# Patient Record
Sex: FEMALE | Race: WHITE | NOT HISPANIC OR LATINO | Employment: FULL TIME | URBAN - METROPOLITAN AREA
[De-identification: names, ages, dates, MRNs, and addresses within clinical notes are randomized per-mention and may not be internally consistent; named-entity substitution may affect disease eponyms.]

---

## 2017-04-10 DIAGNOSIS — Z12.31 ENCOUNTER FOR SCREENING MAMMOGRAM FOR MALIGNANT NEOPLASM OF BREAST: ICD-10-CM

## 2017-04-11 ENCOUNTER — ALLSCRIPTS OFFICE VISIT (OUTPATIENT)
Dept: OTHER | Facility: OTHER | Age: 42
End: 2017-04-11

## 2017-04-14 ENCOUNTER — GENERIC CONVERSION - ENCOUNTER (OUTPATIENT)
Dept: OTHER | Facility: OTHER | Age: 42
End: 2017-04-14

## 2017-04-14 LAB
ADDITIONAL INFORMATION (HISTORICAL): NORMAL
ADEQUACY: (HISTORICAL): NORMAL
COMMENT (HISTORICAL): NORMAL
CYTOTECHNOLOGIST: (HISTORICAL): NORMAL
INTERPRETATION (HISTORICAL): NORMAL
LMP (HISTORICAL): NORMAL
PREV. BX: (HISTORICAL): NORMAL
PREV. PAP (HISTORICAL): NORMAL
SOURCE (HISTORICAL): NORMAL

## 2017-05-19 ENCOUNTER — GENERIC CONVERSION - ENCOUNTER (OUTPATIENT)
Dept: OTHER | Facility: OTHER | Age: 42
End: 2017-05-19

## 2017-05-19 ENCOUNTER — HOSPITAL ENCOUNTER (OUTPATIENT)
Dept: MAMMOGRAPHY | Facility: HOSPITAL | Age: 42
Discharge: HOME | End: 2017-05-19
Attending: OBSTETRICS & GYNECOLOGY
Payer: COMMERCIAL

## 2017-05-19 DIAGNOSIS — Z12.31 ENCOUNTER FOR SCREENING MAMMOGRAM FOR MALIGNANT NEOPLASM OF BREAST: ICD-10-CM

## 2017-05-19 PROCEDURE — 77063 BREAST TOMOSYNTHESIS BI: CPT

## 2017-05-19 PROCEDURE — G0202 SCR MAMMO BI INCL CAD: HCPCS

## 2017-05-24 DIAGNOSIS — R92.8 OTHER ABNORMAL AND INCONCLUSIVE FINDINGS ON DIAGNOSTIC IMAGING OF BREAST: ICD-10-CM

## 2017-05-25 ENCOUNTER — HOSPITAL ENCOUNTER (OUTPATIENT)
Dept: ULTRASOUND IMAGING | Facility: HOSPITAL | Age: 42
Discharge: HOME | End: 2017-05-25
Payer: COMMERCIAL

## 2017-05-25 ENCOUNTER — HOSPITAL ENCOUNTER (OUTPATIENT)
Dept: MAMMOGRAPHY | Facility: HOSPITAL | Age: 42
Discharge: HOME | End: 2017-05-25
Attending: OBSTETRICS & GYNECOLOGY
Payer: COMMERCIAL

## 2017-05-25 DIAGNOSIS — R92.8 OTHER ABNORMAL AND INCONCLUSIVE FINDINGS ON DIAGNOSTIC IMAGING OF BREAST: ICD-10-CM

## 2017-05-25 PROCEDURE — G0206 DX MAMMO INCL CAD UNI: HCPCS

## 2017-05-25 PROCEDURE — 76642 ULTRASOUND BREAST LIMITED: CPT

## 2017-06-02 ENCOUNTER — GENERIC CONVERSION - ENCOUNTER (OUTPATIENT)
Dept: OTHER | Facility: OTHER | Age: 42
End: 2017-06-02

## 2017-06-29 ENCOUNTER — TRANSCRIBE ORDERS (OUTPATIENT)
Dept: ADMINISTRATIVE | Facility: HOSPITAL | Age: 42
End: 2017-06-29

## 2017-06-29 DIAGNOSIS — E04.9 GOITER: Primary | ICD-10-CM

## 2017-07-03 ENCOUNTER — HOSPITAL ENCOUNTER (OUTPATIENT)
Dept: ULTRASOUND IMAGING | Facility: HOSPITAL | Age: 42
Discharge: HOME | End: 2017-07-03
Payer: COMMERCIAL

## 2017-07-03 DIAGNOSIS — E04.9 GOITER: ICD-10-CM

## 2017-07-03 PROCEDURE — 76536 US EXAM OF HEAD AND NECK: CPT

## 2017-08-10 ENCOUNTER — APPOINTMENT (EMERGENCY)
Dept: CT IMAGING | Facility: HOSPITAL | Age: 42
End: 2017-08-10
Payer: COMMERCIAL

## 2017-08-10 ENCOUNTER — HOSPITAL ENCOUNTER (EMERGENCY)
Facility: HOSPITAL | Age: 42
Discharge: HOME | End: 2017-08-10
Attending: EMERGENCY MEDICINE | Admitting: EMERGENCY MEDICINE
Payer: COMMERCIAL

## 2017-08-10 ENCOUNTER — APPOINTMENT (EMERGENCY)
Dept: RADIOLOGY | Facility: HOSPITAL | Age: 42
End: 2017-08-10
Payer: COMMERCIAL

## 2017-08-10 VITALS
WEIGHT: 166.23 LBS | SYSTOLIC BLOOD PRESSURE: 130 MMHG | BODY MASS INDEX: 30.59 KG/M2 | HEART RATE: 71 BPM | HEIGHT: 62 IN | DIASTOLIC BLOOD PRESSURE: 68 MMHG | OXYGEN SATURATION: 96 % | TEMPERATURE: 99.8 F | RESPIRATION RATE: 18 BRPM

## 2017-08-10 DIAGNOSIS — R00.2 PALPITATIONS: Primary | ICD-10-CM

## 2017-08-10 LAB
ALBUMIN SERPL BCP-MCNC: 3.7 G/DL (ref 3.5–5)
ALP SERPL-CCNC: 48 U/L (ref 46–116)
ALT SERPL W P-5'-P-CCNC: 26 U/L (ref 12–78)
ANION GAP SERPL CALCULATED.3IONS-SCNC: 11 MMOL/L (ref 4–13)
APTT PPP: 24 SECONDS (ref 23–35)
AST SERPL W P-5'-P-CCNC: 16 U/L (ref 5–45)
BASOPHILS # BLD AUTO: 0.05 THOUSANDS/ΜL (ref 0–0.1)
BASOPHILS NFR BLD AUTO: 1 % (ref 0–1)
BILIRUB SERPL-MCNC: 0.9 MG/DL (ref 0.2–1)
BILIRUB UR QL STRIP: NEGATIVE
BUN SERPL-MCNC: 12 MG/DL (ref 5–25)
CALCIUM SERPL-MCNC: 9 MG/DL (ref 8.3–10.1)
CHLORIDE SERPL-SCNC: 102 MMOL/L (ref 100–108)
CLARITY UR: CLEAR
CO2 SERPL-SCNC: 26 MMOL/L (ref 21–32)
COLOR UR: YELLOW
CREAT SERPL-MCNC: 1.11 MG/DL (ref 0.6–1.3)
EOSINOPHIL # BLD AUTO: 0.18 THOUSAND/ΜL (ref 0–0.61)
EOSINOPHIL NFR BLD AUTO: 3 % (ref 0–6)
ERYTHROCYTE [DISTWIDTH] IN BLOOD BY AUTOMATED COUNT: 11.8 % (ref 11.6–15.1)
GFR SERPL CREATININE-BSD FRML MDRD: 61 ML/MIN/1.73SQ M
GLUCOSE SERPL-MCNC: 101 MG/DL (ref 65–140)
GLUCOSE UR STRIP-MCNC: NEGATIVE MG/DL
HCG UR QL: NORMAL
HCT VFR BLD AUTO: 45.1 % (ref 34.8–46.1)
HGB BLD-MCNC: 15.6 G/DL (ref 11.5–15.4)
HGB UR QL STRIP.AUTO: NEGATIVE
INR PPP: 0.9 (ref 0.86–1.16)
KETONES UR STRIP-MCNC: NEGATIVE MG/DL
LEUKOCYTE ESTERASE UR QL STRIP: NEGATIVE
LYMPHOCYTES # BLD AUTO: 2.97 THOUSANDS/ΜL (ref 0.6–4.47)
LYMPHOCYTES NFR BLD AUTO: 44 % (ref 14–44)
MAGNESIUM SERPL-MCNC: 1.9 MG/DL (ref 1.6–2.6)
MCH RBC QN AUTO: 31.9 PG (ref 26.8–34.3)
MCHC RBC AUTO-ENTMCNC: 34.6 G/DL (ref 31.4–37.4)
MCV RBC AUTO: 92 FL (ref 82–98)
MONOCYTES # BLD AUTO: 0.53 THOUSAND/ΜL (ref 0.17–1.22)
MONOCYTES NFR BLD AUTO: 8 % (ref 4–12)
NEUTROPHILS # BLD AUTO: 3.05 THOUSANDS/ΜL (ref 1.85–7.62)
NEUTS SEG NFR BLD AUTO: 44 % (ref 43–75)
NITRITE UR QL STRIP: NEGATIVE
PH UR STRIP.AUTO: 7 [PH] (ref 4.5–8)
PLATELET # BLD AUTO: 211 THOUSANDS/UL (ref 149–390)
PMV BLD AUTO: 10.9 FL (ref 8.9–12.7)
POTASSIUM SERPL-SCNC: 3.2 MMOL/L (ref 3.5–5.3)
PROT SERPL-MCNC: 7.4 G/DL (ref 6.4–8.2)
PROT UR STRIP-MCNC: NEGATIVE MG/DL
PROTHROMBIN TIME: 12.1 SECONDS (ref 12.1–14.4)
RBC # BLD AUTO: 4.89 MILLION/UL (ref 3.81–5.12)
SODIUM SERPL-SCNC: 139 MMOL/L (ref 136–145)
SP GR UR STRIP.AUTO: <=1.005 (ref 1–1.03)
TROPONIN I SERPL-MCNC: <0.02 NG/ML
TSH SERPL DL<=0.05 MIU/L-ACNC: 3.89 UIU/ML (ref 0.36–3.74)
UROBILINOGEN UR QL STRIP.AUTO: 0.2 E.U./DL
WBC # BLD AUTO: 6.78 THOUSAND/UL (ref 4.31–10.16)

## 2017-08-10 PROCEDURE — 93005 ELECTROCARDIOGRAM TRACING: CPT | Performed by: EMERGENCY MEDICINE

## 2017-08-10 PROCEDURE — 85730 THROMBOPLASTIN TIME PARTIAL: CPT | Performed by: EMERGENCY MEDICINE

## 2017-08-10 PROCEDURE — 80053 COMPREHEN METABOLIC PANEL: CPT | Performed by: EMERGENCY MEDICINE

## 2017-08-10 PROCEDURE — 71275 CT ANGIOGRAPHY CHEST: CPT

## 2017-08-10 PROCEDURE — 84484 ASSAY OF TROPONIN QUANT: CPT | Performed by: EMERGENCY MEDICINE

## 2017-08-10 PROCEDURE — 84443 ASSAY THYROID STIM HORMONE: CPT | Performed by: EMERGENCY MEDICINE

## 2017-08-10 PROCEDURE — 71020 HB CHEST X-RAY 2VW FRONTAL&LATL: CPT

## 2017-08-10 PROCEDURE — 81003 URINALYSIS AUTO W/O SCOPE: CPT | Performed by: EMERGENCY MEDICINE

## 2017-08-10 PROCEDURE — 99285 EMERGENCY DEPT VISIT HI MDM: CPT

## 2017-08-10 PROCEDURE — 96360 HYDRATION IV INFUSION INIT: CPT

## 2017-08-10 PROCEDURE — 83735 ASSAY OF MAGNESIUM: CPT | Performed by: EMERGENCY MEDICINE

## 2017-08-10 PROCEDURE — 85610 PROTHROMBIN TIME: CPT | Performed by: EMERGENCY MEDICINE

## 2017-08-10 PROCEDURE — 85025 COMPLETE CBC W/AUTO DIFF WBC: CPT | Performed by: EMERGENCY MEDICINE

## 2017-08-10 PROCEDURE — 36415 COLL VENOUS BLD VENIPUNCTURE: CPT | Performed by: EMERGENCY MEDICINE

## 2017-08-10 PROCEDURE — 81025 URINE PREGNANCY TEST: CPT | Performed by: EMERGENCY MEDICINE

## 2017-08-10 RX ORDER — RANITIDINE 150 MG/1
150 CAPSULE ORAL 2 TIMES DAILY
COMMUNITY
End: 2018-07-10

## 2017-08-10 RX ORDER — NORETHINDRONE ACETATE AND ETHINYL ESTRADIOL 1; 5 MG/1; UG/1
TABLET ORAL DAILY
COMMUNITY
End: 2018-07-10

## 2017-08-10 RX ADMIN — SODIUM CHLORIDE 1000 ML: 0.9 INJECTION, SOLUTION INTRAVENOUS at 21:09

## 2017-08-10 RX ADMIN — IOHEXOL 85 ML: 350 INJECTION, SOLUTION INTRAVENOUS at 22:21

## 2017-08-11 ENCOUNTER — HOSPITAL ENCOUNTER (OUTPATIENT)
Dept: NON INVASIVE DIAGNOSTICS | Facility: HOSPITAL | Age: 42
Discharge: HOME | End: 2017-08-11
Attending: EMERGENCY MEDICINE
Payer: COMMERCIAL

## 2017-08-11 DIAGNOSIS — R00.2 PALPITATIONS: ICD-10-CM

## 2017-08-11 LAB
ATRIAL RATE: 97 BPM
P AXIS: 54 DEGREES
PR INTERVAL: 126 MS
QRS AXIS: 51 DEGREES
QRSD INTERVAL: 76 MS
QT INTERVAL: 358 MS
QTC INTERVAL: 454 MS
T WAVE AXIS: 25 DEGREES
VENTRICULAR RATE: 97 BPM

## 2017-08-11 PROCEDURE — 93226 XTRNL ECG REC<48 HR SCAN A/R: CPT

## 2017-08-11 PROCEDURE — 93225 XTRNL ECG REC<48 HRS REC: CPT

## 2017-11-16 ENCOUNTER — HOSPITAL ENCOUNTER (OUTPATIENT)
Dept: MAMMOGRAPHY | Facility: HOSPITAL | Age: 42
Discharge: HOME | End: 2017-11-16
Payer: COMMERCIAL

## 2017-11-16 ENCOUNTER — HOSPITAL ENCOUNTER (OUTPATIENT)
Dept: ULTRASOUND IMAGING | Facility: HOSPITAL | Age: 42
Discharge: HOME | End: 2017-11-16
Attending: OBSTETRICS & GYNECOLOGY
Payer: COMMERCIAL

## 2017-11-16 DIAGNOSIS — R92.8 OTHER ABNORMAL AND INCONCLUSIVE FINDINGS ON DIAGNOSTIC IMAGING OF BREAST: ICD-10-CM

## 2017-11-16 DIAGNOSIS — N64.59 SYMPTOMS IN BREAST: ICD-10-CM

## 2017-11-16 DIAGNOSIS — R92.8 ABNORMAL MAMMOGRAM OF RIGHT BREAST: ICD-10-CM

## 2017-11-16 PROCEDURE — 76642 ULTRASOUND BREAST LIMITED: CPT

## 2017-11-16 PROCEDURE — G0206 DX MAMMO INCL CAD UNI: HCPCS

## 2017-11-16 PROCEDURE — G0279 TOMOSYNTHESIS, MAMMO: HCPCS

## 2017-11-17 ENCOUNTER — GENERIC CONVERSION - ENCOUNTER (OUTPATIENT)
Dept: OTHER | Facility: OTHER | Age: 42
End: 2017-11-17

## 2017-11-17 DIAGNOSIS — Z12.31 ENCOUNTER FOR SCREENING MAMMOGRAM FOR MALIGNANT NEOPLASM OF BREAST: ICD-10-CM

## 2018-01-09 NOTE — RESULT NOTES
Verified Results  ([address] [facility]) Pap IG, rfx HPV ASCU,16/18 [id number] 12:00AM Verónica Oralia   No  of containers  Rosella Goldmann 01 CYTYC Thin Prep Vial     Test Name Result Flag Reference   DIAGNOSIS: Comment     NEGATIVE FOR INTRAEPITHELIAL LESION AND MALIGNANCY  Specimen adequacy: Comment     Satisfactory for evaluation  Endocervical and/or squamous metaplastic  cells (endocervical component) are present  Clinician provided ICD10: Comment     Z01 419   Performed by: Helga Santacruz, Cytotechnologist (ASCP)     Rosella Goldmann Note: Comment     The Pap smear is a screening test designed to aid in the detection of  premalignant and malignant conditions of the uterine cervix  It is not a  diagnostic procedure and should not be used as the sole means of detecting  cervical cancer  Both false-positive and false-negative reports do occur  Test Methodology: Comment     This liquid based ThinPrep(R) pap test was screened with the  use of an image guided system    Comment     The HPV DNA reflex criteria were not met with this specimen result  therefore, no HPV testing was performed

## 2018-01-13 NOTE — RESULT NOTES
Verified Results  (Q) THINPREP TIS PAP RFX HPV [id number] 12:00AM Jad Sanchesi     Test Name Result Flag Reference   CLINICAL INFORMATION:      HPV NEGATIVE 2014   LMP:      NONE GIVEN   PREV  PAP:      NONE GIVEN   PREV  BX:      NONE GIVEN   SOURCE:      Endocervix   STATEMENT OF ADEQUACY:      Satisfactory for evaluation  Endocervical/transformation zone component  present  Age and/or menstrual status not provided   INTERPRETATION/RESULT:      Negative for intraepithelial lesion or malignancy  COMMENT:      This Pap test has been evaluated with computer  assisted technology     CYTOTECHNOLOGIST:      DDM, CT(ASCP)  CT screening location: [facility], [address] [place] [address]

## 2018-01-13 NOTE — RESULT NOTES
Verified Results  MAMMO SCREENING BILATERAL W 3D & CAD [id number] 12:45PM Jelani Freeze Order Number: [id number]    - Patient Instructions: To schedule this appointment, please contact Central Scheduling at [phone]  Do not wear any perfume, powder, lotion or deodorant on breast or underarm area  Please bring your doctors order, referral (if needed) and insurance information with you on the day of the test  Failure to bring this information may result in this test being rescheduled  Arrive 15 minutes prior to your appointment time to register  On the day of your test, please bring any prior mammogram or breast studies with you that were not performed at a [facility]  Failure to bring prior exams may result in your test needing to be rescheduled  Test Name Result Flag Reference   MAMMO SCREENING BILATERAL W 3D & CAD (Report)     Patient History:   Patient is nulliparous  No known family history of cancer  Patient has never smoked  Patient's BMI is 28 3  Reason for exam: screening, asymptomatic  Screening     Mammo Screening Bilateral W DBT and CAD: May 19, 2017 - Check In    #: [de-identified]   2D/3D Procedure   3D views: Bilateral MLO view(s) were taken  2D views: Bilateral CC view(s) were taken  Technologist: SHAWN Hardin (SHAWN)(M)   Prior study comparison: March 10, 2016, mammo screening bilateral   W CAD performed at [address] [place] [address]  The breast tissue is heterogeneously dense, potentially limiting    the sensitivity of mammography  Patient risk, included in this    report, assists in determining the appropriate screening regimen    (such as 3-D mammography or the inclusion of automated breast    ultrasound or MRI)  3-D mammography may also remain indicated as    screening  Oval nodular asymmetry is identified in the 6 o'clock   position of the right breast for which follow-up mammography and   potential ultrasound is recommended   Left breast is    unremarkable  No suspicious microcalcifications are seen  Needs additional imaging  ACR BI-RADSï¾® Assessments: BiRad:0 - Incomplete: needs additional    imaging evaluation     Recommendation:   Further imaging  A breast health care nurse from our facility will be contacting    the patient regarding the need for additional imaging  8-10% of cancers will be missed on mammography  Management of a    palpable abnormality must be based on clinical grounds  Patients    will be notified of their results via letter from our facility  Accredited by [employer] of [employer] and FDA  Transcription Location: [facility] 98: [id number]     Risk Value(s):   Tyrer-Cuzick 10 Year: 1 700%, Tyrer-Cuzick Lifetime: 12 300%,    Myriad Table: 1 5%, ROSAURA 5 Year: 0 6%, NCI Lifetime: 10 1%   Signed by:    Shruthi Deleon MD   5/19/17

## 2018-01-14 VITALS
HEIGHT: 62 IN | DIASTOLIC BLOOD PRESSURE: 70 MMHG | WEIGHT: 163 LBS | SYSTOLIC BLOOD PRESSURE: 128 MMHG | BODY MASS INDEX: 30 KG/M2

## 2018-01-18 NOTE — RESULT NOTES
Verified Results  MAMMO DIAGNOSTIC RIGHT W CAD [id number] 10:33AM [facility] Order Number: [id number]    - Patient Instructions: To schedule this appointment, please contact Central Scheduling at [phone]  Do not wear any perfume, powder, lotion or deodorant on breast or underarm area  Please bring your doctors order, referral (if needed) and insurance information with you on the day of the test  Failure to bring this information may result in this test being rescheduled  Arrive 15 minutes prior to your appointment time to register  On the day of your test, please bring any prior mammogram or breast studies with you that were not performed at a [facility]  Failure to bring prior exams may result in your test needing to be rescheduled  Test Name Result Flag Reference   [facility] DIAGNOSTIC RIGHT W CAD (Report)     Patient History:   Patient is nulliparous  No known family history of cancer  Taking hormonal contraceptives beginning at age 13  Patient has never smoked  Patient's BMI is 28 3  US Breast Right Limited: May 25, 2017 - Check In #: [de-identified]   Standard views  Technologist: Tigist Carey RDMS   Prior study comparison: May 19, 2017, mammo screening bilateral W   DBT and CAD performed at [address] [place] [address]  March 10, 2016, mammo screening bilateral W CAD performed at [address] [place] [address]  Mammo Diagnostic Right W CAD: May 25, 2017 - Check In #: [de-identified]   ML, spot compression CC, and spot compression ML view(s) were    taken of the right breast      Technologist: RT Virgen(SHAWN)(M)   INDICATION: Callback from screening mammogram      TISSUE DENSITY: The breasts are heterogeneously dense, which may    obscure small masses       FINDINGS: The screening mammogram described an oval nodular    asymmetry at the 6 o'clock position of the right breast  This    area becomes much less prominent on compression views and is not well-visualized on the straight lateral projection  No underlying    suspicious mass or architectural distortion  No suspicious    microcalcification  FINDINGS: Targeted sonographic evaluation of the 4 to 7 o'clock    position of the right breast demonstrates normal-appearing    fibroglandular tissue without solid mass or abnormal shadowing  Additional six-month follow-up right diagnostic mammogram and    ultrasound are recommended  ACR BI-RADSï¾® Assessments: BiRad:3 - Probably Benign (Overall)   Right breast Right Diag Mamm: BiRad:3 - probably benign finding    in the right breast    Right breast Right Brst US: Right breast is BiRad:1 - negative  Recommendation:   Follow-up diagnostic mammogram and ultrasound of the right breast   in 6 months  Analyzed by CAD     8-10% of cancers will be missed on mammography  Management of a    palpable abnormality must be based on clinical grounds  Patients   will be notified of their results via letter from our facility  Accredited by [employer] and FDA       Transcription Location: SHAWN Renteria 98: [id number]     Risk Value(s):   Tyrer-Cuzick 10 Year: 1 700%, Tyrer-Cuzick Lifetime: 12 300%,    Myriad Table: 1 5%, ROSAURA 5 Year: 0 6%, NCI Lifetime: 10 1%

## 2018-02-13 NOTE — RESULT NOTES
Verified Results  *US BREAST RIGHT LIMITED (DIAGNOSTIC) [id number] 10:02AM Aayush Duncan Order Number: [id number]   Performing Comments: 6 month follow up   - Patient Instructions: To schedule this appointment, please contact Central Scheduling at [phone]  Test Name Result Flag Reference   US BREAST RIGHT LIMITED (Report)     Patient History:   Short interval follow up   Patient is nulliparous  No known family history of cancer  Taking hormonal contraceptives beginning at age 13  Patient has never smoked  Patient's BMI is 28 3  Reason for exam: follow-up at short interval from prior study  Mammo Diagnostic Right W DBT and CAD: November 16, 2017 - Check    In #: [de-identified]   2D/3D Procedure   3D views: CC and ML view(s) were taken of the right breast        Technologist: SHAWN Romero (SHAWN)(M)   Prior study comparison: May 25, 2017, US breast right limited    performed at [address] [place] [address]  May 25, 2017,    mammo diagnostic right W CAD performed at [address] [place] [address]  May 19, 2017, mammo screening bilateral W DBT and   CAD performed at [address] [place] [address]  March 10,    2016, mammo screening bilateral W CAD performed at [address] [place] [address]  The breast tissue is heterogeneously dense, potentially limiting    the sensitivity of mammography  Patient risk, included in this    report, assists in determining the appropriate screening regimen    (such as 3-D mammography or the inclusion of automated breast    ultrasound or MRI)  3-D mammography may also remain indicated as    screening  A combination of mediolateral oblique 3D tomographic    slices as well as standard two-dimensional orthogonal images were   obtained       Oval nodular asymmetry at the 6 o'clock position of the right    breast on previous screening mammography which became much less    prominent on compression demonstrates an unchanged appearance on    the current 3-D examination as on the 3-D screening projection  No change in mammographic appearance, size, or background    parenchyma  No new or suspicious mass or architectural    distortion  No suspicious microcalcifications noted  Mammographic appearance is most suspicious for asymmetric    fibroglandular parenchyma  Targeted sonographic evaluation of the 4 to 7 o'clock position of   the right breast again demonstrates normal-appearing    fibroglandular tissue without solid mass or abnormal shadowing  US Breast Right Limited: November 16, 2017 - Check In #: [de-identified]   Standard views  Technologist: SHAWN Ryan,FRANKIE   Heterogeneity can be either focal or diffuse  The breast    echotexture is characterized by multiple small areas of increased   and decreased echogenicity  Shadowing may occur at the    interfaces of the fat lobules and parenchyma  This pattern occurs   in younger breasts and those with heterogeneously dense    parenchyma depicted mammographically  Summation    shadow of fibronodular parenchyma in the right breast  No    mammographic or sonographic evidence of right breast malignancy  ACR BI-RADSï¾® Assessments: BiRad:2 - Benign (Overall)   Right breast Right Diag Mamm: BiRad:2 - benign finding in the    right breast    Right breast Right Brst US: BiRad:2 - benign finding in the right   breast      Recommendation:   Routine screening mammogram of both breasts in 6 months  The patient is scheduled in a reminder system for screening    mammography       Transcription Location: [facility] 98: [id number]     Risk Value(s):   Tyrer-Cuzick 10 Year: 1 800%, Tyrer-Cuzick Lifetime: 12 100%,    Myriad Table: 1 5%, ROSAURA 5 Year: 0 7%, NCI Lifetime: 10 0%   Signed by:   Moni Horn MD   11/16/17

## 2018-04-04 DIAGNOSIS — Z30.41 ENCOUNTER FOR SURVEILLANCE OF CONTRACEPTIVE PILLS: Primary | ICD-10-CM

## 2018-04-05 RX ORDER — NORETHINDRONE ACETATE AND ETHINYL ESTRADIOL 1; 20 MG/1; UG/1
TABLET ORAL
Qty: 84 TABLET | Refills: 3 | Status: SHIPPED | OUTPATIENT
Start: 2018-04-05 | End: 2018-07-10 | Stop reason: SDUPTHER

## 2018-04-23 ENCOUNTER — TRANSCRIBE ORDERS (OUTPATIENT)
Dept: ADMINISTRATIVE | Facility: HOSPITAL | Age: 43
End: 2018-04-23

## 2018-04-23 DIAGNOSIS — R42 DIZZINESSES: Primary | ICD-10-CM

## 2018-04-23 DIAGNOSIS — Z12.31 ENCOUNTER FOR SCREENING MAMMOGRAM FOR MALIGNANT NEOPLASM OF BREAST: Primary | ICD-10-CM

## 2018-04-26 ENCOUNTER — HOSPITAL ENCOUNTER (OUTPATIENT)
Dept: MRI IMAGING | Facility: HOSPITAL | Age: 43
Discharge: HOME | End: 2018-04-26
Payer: COMMERCIAL

## 2018-04-26 DIAGNOSIS — R42 DIZZINESSES: ICD-10-CM

## 2018-04-26 PROCEDURE — 70553 MRI BRAIN STEM W/O & W/DYE: CPT

## 2018-04-26 PROCEDURE — A9577 INJ MULTIHANCE: HCPCS | Performed by: RADIOLOGY

## 2018-04-26 RX ADMIN — GADOBENATE DIMEGLUMINE 12 ML: 529 INJECTION, SOLUTION INTRAVENOUS at 15:39

## 2018-05-11 ENCOUNTER — TRANSCRIBE ORDERS (OUTPATIENT)
Dept: ADMINISTRATIVE | Facility: HOSPITAL | Age: 43
End: 2018-05-11

## 2018-05-11 DIAGNOSIS — H81.4 VERTIGO OF CENTRAL ORIGIN, UNSPECIFIED LATERALITY: Primary | ICD-10-CM

## 2018-05-15 ENCOUNTER — HOSPITAL ENCOUNTER (OUTPATIENT)
Dept: MRI IMAGING | Facility: HOSPITAL | Age: 43
Discharge: HOME | End: 2018-05-15
Payer: COMMERCIAL

## 2018-05-15 DIAGNOSIS — H81.4 VERTIGO OF CENTRAL ORIGIN, UNSPECIFIED LATERALITY: ICD-10-CM

## 2018-05-15 PROCEDURE — A9585 GADOBUTROL INJECTION: HCPCS | Performed by: RADIOLOGY

## 2018-05-15 PROCEDURE — 72156 MRI NECK SPINE W/O & W/DYE: CPT

## 2018-05-15 RX ADMIN — GADOBUTROL 6 ML: 604.72 INJECTION INTRAVENOUS at 10:00

## 2018-05-29 ENCOUNTER — HOSPITAL ENCOUNTER (OUTPATIENT)
Dept: MAMMOGRAPHY | Facility: HOSPITAL | Age: 43
Discharge: HOME | End: 2018-05-29
Attending: OBSTETRICS & GYNECOLOGY
Payer: COMMERCIAL

## 2018-05-29 DIAGNOSIS — Z12.31 ENCOUNTER FOR SCREENING MAMMOGRAM FOR MALIGNANT NEOPLASM OF BREAST: ICD-10-CM

## 2018-05-29 PROCEDURE — 77067 SCR MAMMO BI INCL CAD: CPT

## 2018-05-29 PROCEDURE — 77063 BREAST TOMOSYNTHESIS BI: CPT

## 2018-06-11 ENCOUNTER — TRANSCRIBE ORDERS (OUTPATIENT)
Dept: ADMINISTRATIVE | Facility: HOSPITAL | Age: 43
End: 2018-06-11

## 2018-06-11 ENCOUNTER — HOSPITAL ENCOUNTER (OUTPATIENT)
Dept: MAMMOGRAPHY | Facility: HOSPITAL | Age: 43
Discharge: HOME | End: 2018-06-11
Attending: OBSTETRICS & GYNECOLOGY
Payer: COMMERCIAL

## 2018-06-11 ENCOUNTER — HOSPITAL ENCOUNTER (OUTPATIENT)
Dept: ULTRASOUND IMAGING | Facility: HOSPITAL | Age: 43
Discharge: HOME | End: 2018-06-11
Attending: OBSTETRICS & GYNECOLOGY

## 2018-06-11 DIAGNOSIS — R42 DIZZINESS AND GIDDINESS: Primary | ICD-10-CM

## 2018-06-11 DIAGNOSIS — R92.8 ABNORMAL MAMMOGRAM: ICD-10-CM

## 2018-06-11 DIAGNOSIS — G43.009 MIGRAINE WITHOUT AURA AND WITHOUT STATUS MIGRAINOSUS, NOT INTRACTABLE: ICD-10-CM

## 2018-06-11 DIAGNOSIS — D45 CHRONIC ERYTHREMIA IN REMISSION (HCC): ICD-10-CM

## 2018-06-11 PROCEDURE — G0279 TOMOSYNTHESIS, MAMMO: HCPCS

## 2018-06-11 PROCEDURE — 77065 DX MAMMO INCL CAD UNI: CPT

## 2018-06-25 ENCOUNTER — HOSPITAL ENCOUNTER (OUTPATIENT)
Dept: MRI IMAGING | Facility: HOSPITAL | Age: 43
Discharge: HOME | End: 2018-06-25
Payer: COMMERCIAL

## 2018-06-25 DIAGNOSIS — G43.009 MIGRAINE WITHOUT AURA AND WITHOUT STATUS MIGRAINOSUS, NOT INTRACTABLE: ICD-10-CM

## 2018-06-25 DIAGNOSIS — D45 CHRONIC ERYTHREMIA IN REMISSION (HCC): ICD-10-CM

## 2018-06-25 DIAGNOSIS — R42 DIZZINESS AND GIDDINESS: ICD-10-CM

## 2018-06-25 PROCEDURE — 70544 MR ANGIOGRAPHY HEAD W/O DYE: CPT

## 2018-06-25 PROCEDURE — 70547 MR ANGIOGRAPHY NECK W/O DYE: CPT

## 2018-07-10 ENCOUNTER — ANNUAL EXAM (OUTPATIENT)
Dept: OBGYN CLINIC | Facility: CLINIC | Age: 43
End: 2018-07-10
Payer: COMMERCIAL

## 2018-07-10 VITALS — SYSTOLIC BLOOD PRESSURE: 126 MMHG | BODY MASS INDEX: 27.62 KG/M2 | WEIGHT: 151 LBS | DIASTOLIC BLOOD PRESSURE: 72 MMHG

## 2018-07-10 DIAGNOSIS — Z12.39 SCREENING FOR MALIGNANT NEOPLASM OF BREAST: ICD-10-CM

## 2018-07-10 DIAGNOSIS — Z01.419 ENCOUNTER FOR WELL WOMAN EXAM WITH ROUTINE GYNECOLOGICAL EXAM: Primary | ICD-10-CM

## 2018-07-10 DIAGNOSIS — Z12.4 ENCOUNTER FOR PAPANICOLAOU SMEAR FOR CERVICAL CANCER SCREENING: ICD-10-CM

## 2018-07-10 DIAGNOSIS — Z30.41 ENCOUNTER FOR SURVEILLANCE OF CONTRACEPTIVE PILLS: ICD-10-CM

## 2018-07-10 PROCEDURE — 99396 PREV VISIT EST AGE 40-64: CPT | Performed by: OBSTETRICS & GYNECOLOGY

## 2018-07-10 RX ORDER — NORETHINDRONE ACETATE AND ETHINYL ESTRADIOL 1; 20 MG/1; UG/1
TABLET ORAL
Qty: 84 TABLET | Refills: 3 | Status: SHIPPED | OUTPATIENT
Start: 2018-07-10 | End: 2019-07-19 | Stop reason: SDUPTHER

## 2018-07-10 RX ORDER — ZOLMITRIPTAN 2.5 MG/1
TABLET, ORALLY DISINTEGRATING ORAL
COMMUNITY

## 2018-07-10 RX ORDER — MECLIZINE HYDROCHLORIDE 25 MG/1
TABLET ORAL
COMMUNITY
Start: 2018-06-04

## 2018-07-10 RX ORDER — LORAZEPAM 0.5 MG/1
TABLET ORAL
COMMUNITY

## 2018-07-10 RX ORDER — ZOLMITRIPTAN 5 MG/1
TABLET, ORALLY DISINTEGRATING ORAL
COMMUNITY
Start: 2018-05-14

## 2018-07-10 RX ORDER — VALACYCLOVIR HYDROCHLORIDE 1 G/1
TABLET, FILM COATED ORAL
COMMUNITY
Start: 2018-06-04

## 2018-07-10 RX ORDER — ONDANSETRON 4 MG/1
TABLET, ORALLY DISINTEGRATING ORAL
COMMUNITY

## 2018-07-10 RX ORDER — DIAZEPAM 2 MG/1
TABLET ORAL
COMMUNITY
Start: 2018-06-11

## 2018-07-10 NOTE — PROGRESS NOTES
ASSESSMENT & PLAN: Robbie Ham is a 43 y o  Skipper Natter with normal gynecologic exam     1   Routine well woman exam done today  2  Pap and HPV:  The patient's last pap and hpv was   It was normal     Pap was done today  Current ASCCP Guidelines reviewed  3   Mammogram ordered  4  The following were reviewed in today's visit: breast self exam and mammography screening ordered  CC:  Annual Gynecologic Examination    HPI: Robbie Ham is a 43 y o  Skipper Natter who presents for annual gynecologic examination  She has the following concerns:  Doing well on OCPs  Health Maintenance:    She wears her seatbelt routinely  She does perform regular monthly self breast exams  She feels safe at home  Last PAP & HPV: , paps annually  Last mammogram:      Past Medical History:   Diagnosis Date    GERD (gastroesophageal reflux disease)        Past Surgical History:   Procedure Laterality Date    CERVIX SURGERY         Past OB/Gyn History:  OB History      Para Term  AB Living    0 0 0 0 0 0    SAB TAB Ectopic Multiple Live Births    0 0 0 0 0        History of abnormal pap smears: Yes prior LEEP  Patient is currently sexually active  heterosexual     History reviewed  No pertinent family history  Social History:  Social History     Social History    Marital status: /Civil Union     Spouse name: N/A    Number of children: N/A    Years of education: N/A     Occupational History    Not on file       Social History Main Topics    Smoking status: Former Smoker    Smokeless tobacco: Never Used    Alcohol use Yes      Comment: socially    Drug use: No    Sexual activity: Yes     Partners: Male     Other Topics Concern    Not on file     Social History Narrative    No narrative on file         Allergies   Allergen Reactions    Latex Anaphylaxis    Codeine Hives         Current Outpatient Prescriptions:     diazepam (VALIUM) 2 mg tablet, Take one tab by mouth nightly for 2 weeks, then use every 6 hours as needed for vertigo, Disp: , Rfl:     JUNEL 1/20 1-20 MG-MCG per tablet, TAKE 1 TABLET DAILY        CONTINUOUSLY  DO NOT TAKE  PLACEBO , Disp: 84 tablet, Rfl: 3    LORazepam (ATIVAN) 0 5 mg tablet, Take by mouth, Disp: , Rfl:     meclizine (ANTIVERT) 25 mg tablet, , Disp: , Rfl:     ondansetron (ZOFRAN ODT) 4 mg disintegrating tablet, , Disp: , Rfl:     valACYclovir (VALTREX) 1,000 mg tablet, , Disp: , Rfl:     ZOLMitriptan (ZOMIG ZMT) 2 5 MG disintegrating tablet, , Disp: , Rfl:     ZOLMitriptan (ZOMIG-ZMT) 5 MG disintegrating tablet, , Disp: , Rfl:     Review of Systems  Constitutional :no fever, feels well, no tiredness, no recent weight gain or loss  ENT: no ear ache, no loss of hearing, no nosebleeds or nasal discharge, no sore throat or hoarseness  Cardiovascular: no complaints of slow or fast heart beat, no chest pain, no palpitations, no leg claudication or lower extremity edema  Respiratory: no complaints of shortness of shortness of breath, no CARRERA  Breasts:no complaints of breast pain, breast lump, or nipple discharge  Gastrointestinal: no complaints of abdominal pain, constipation, nausea, vomiting, or diarrhea or bloody stools  Genitourinary : no complaints of dysuria, incontinence, pelvic pain, no dysmenorrhea, vaginal discharge or abnormal vaginal bleeding and as noted in HPI  Musculoskeletal: no complaints of arthralgia, no myalgia, no joint swelling or stiffness, no limb pain or swelling    Integumentary: no complaints of skin rash or lesion, itching or dry skin  Neurological: no complaints of headache, no confusion, no numbness or tingling, no dizziness or fainting    Objective      /72   Wt 68 5 kg (151 lb)   BMI 27 62 kg/m²   General:   appears stated age, cooperative, alert normal mood and affect   Neck: normal, supple,trachea midline, no masses   Heart: regular rate and rhythm, S1, S2 normal, no murmur, click, rub or gallop Lungs: clear to auscultation bilaterally   Breasts: normal appearance, no masses or tenderness, Inspection negative, No nipple retraction or dimpling, No nipple discharge or bleeding   Abdomen: soft, non-tender, without masses or organomegaly   Vulva: normal female genitalia, Bartholin's, Urethra, [place] normal, no lesions   Vagina: normal vagina, no discharge, exudate, lesion, or erythema   Urethra: normal   Cervix: Normal, no discharge  PAP done  Uterus: normal size, contour, position, consistency, mobility, non-tender   Adnexa: no mass, fullness, tenderness   Lymphatic palpation of lymph nodes in neck, axilla, groin and/or other locations: no lymphadenopathy or masses noted   Skin normal skin turgor and no rashes     Psychiatric orientation to person, place, and time: normal  mood and affect: normal

## 2018-07-18 LAB
CLINICAL INFO: NORMAL
CYTO CVX: NORMAL
DATE PREVIOUS BX: NORMAL
LMP START DATE: NORMAL
QUESTION/PROBLEM: NORMAL
SL AMB CONTAINER TYPE: NORMAL
SL AMB FINAL RESOLUTION: NORMAL
SL AMB PREV. PAP:: NORMAL
SL AMB REPORT STATUS: NORMAL
SPECIMEN SOURCE CVX/VAG CYTO: NORMAL

## 2019-01-31 ENCOUNTER — TRANSCRIBE ORDERS (OUTPATIENT)
Dept: ADMINISTRATIVE | Facility: HOSPITAL | Age: 44
End: 2019-01-31

## 2019-01-31 DIAGNOSIS — D44.0 TERATOMA OF UNCERTAIN BEHAVIOR OF THYROID GLAND: Primary | ICD-10-CM

## 2019-02-01 ENCOUNTER — HOSPITAL ENCOUNTER (OUTPATIENT)
Dept: ULTRASOUND IMAGING | Facility: HOSPITAL | Age: 44
Discharge: HOME | End: 2019-02-01
Payer: COMMERCIAL

## 2019-02-01 DIAGNOSIS — D44.0 TERATOMA OF UNCERTAIN BEHAVIOR OF THYROID GLAND: ICD-10-CM

## 2019-02-01 PROCEDURE — 76536 US EXAM OF HEAD AND NECK: CPT

## 2019-03-14 ENCOUNTER — TELEPHONE (OUTPATIENT)
Dept: OBGYN CLINIC | Facility: MEDICAL CENTER | Age: 44
End: 2019-03-14

## 2019-04-10 DIAGNOSIS — Z30.41 ENCOUNTER FOR SURVEILLANCE OF CONTRACEPTIVE PILLS: Primary | ICD-10-CM

## 2019-04-10 RX ORDER — NORETHINDRONE ACETATE AND ETHINYL ESTRADIOL AND FERROUS FUMARATE 1MG-20(24)
KIT ORAL
Qty: 96 TABLET | Refills: 3 | Status: SHIPPED | OUTPATIENT
Start: 2019-04-10 | End: 2019-07-26 | Stop reason: ALTCHOICE

## 2019-05-30 ENCOUNTER — HOSPITAL ENCOUNTER (OUTPATIENT)
Dept: MAMMOGRAPHY | Facility: HOSPITAL | Age: 44
Discharge: HOME | End: 2019-05-30
Attending: OBSTETRICS & GYNECOLOGY
Payer: COMMERCIAL

## 2019-05-30 VITALS — HEIGHT: 62 IN | WEIGHT: 151 LBS | BODY MASS INDEX: 27.79 KG/M2

## 2019-05-30 DIAGNOSIS — Z12.39 SCREENING FOR MALIGNANT NEOPLASM OF BREAST: ICD-10-CM

## 2019-05-30 PROCEDURE — 77067 SCR MAMMO BI INCL CAD: CPT

## 2019-05-30 PROCEDURE — 77063 BREAST TOMOSYNTHESIS BI: CPT

## 2019-06-14 ENCOUNTER — TRANSCRIBE ORDERS (OUTPATIENT)
Dept: ADMINISTRATIVE | Facility: HOSPITAL | Age: 44
End: 2019-06-14

## 2019-06-14 DIAGNOSIS — M25.552 LEFT HIP PAIN: Primary | ICD-10-CM

## 2019-06-25 ENCOUNTER — HOSPITAL ENCOUNTER (OUTPATIENT)
Dept: RADIOLOGY | Facility: HOSPITAL | Age: 44
Discharge: HOME | End: 2019-06-25
Payer: COMMERCIAL

## 2019-06-25 ENCOUNTER — HOSPITAL ENCOUNTER (OUTPATIENT)
Dept: MRI IMAGING | Facility: HOSPITAL | Age: 44
Discharge: HOME | End: 2019-06-25
Payer: COMMERCIAL

## 2019-06-25 DIAGNOSIS — M25.552 LEFT HIP PAIN: ICD-10-CM

## 2019-06-25 PROCEDURE — 73722 MRI JOINT OF LWR EXTR W/DYE: CPT

## 2019-06-25 PROCEDURE — A9585 GADOBUTROL INJECTION: HCPCS | Performed by: PHYSICIAN ASSISTANT

## 2019-06-25 PROCEDURE — 27095 INJECTION FOR HIP X-RAY: CPT

## 2019-06-25 PROCEDURE — 77002 NEEDLE LOCALIZATION BY XRAY: CPT

## 2019-06-25 RX ORDER — LIDOCAINE HYDROCHLORIDE 10 MG/ML
5 INJECTION, SOLUTION INFILTRATION; PERINEURAL
Status: COMPLETED | OUTPATIENT
Start: 2019-06-25 | End: 2019-06-25

## 2019-06-25 RX ADMIN — IOHEXOL 3 ML: 240 INJECTION, SOLUTION INTRATHECAL; INTRAVASCULAR; INTRAVENOUS; ORAL at 11:13

## 2019-06-25 RX ADMIN — GADOBUTROL 0.2 ML: 604.72 INJECTION INTRAVENOUS at 11:13

## 2019-06-25 RX ADMIN — LIDOCAINE HYDROCHLORIDE 5 ML: 10 INJECTION, SOLUTION INFILTRATION; PERINEURAL at 11:13

## 2019-07-19 ENCOUNTER — ANNUAL EXAM (OUTPATIENT)
Dept: OBGYN CLINIC | Facility: CLINIC | Age: 44
End: 2019-07-19
Payer: COMMERCIAL

## 2019-07-19 VITALS — DIASTOLIC BLOOD PRESSURE: 60 MMHG | BODY MASS INDEX: 29.69 KG/M2 | WEIGHT: 162.3 LBS | SYSTOLIC BLOOD PRESSURE: 124 MMHG

## 2019-07-19 DIAGNOSIS — Z01.419 ENCOUNTER FOR GYNECOLOGICAL EXAMINATION WITH PAPANICOLAOU SMEAR OF CERVIX: Primary | ICD-10-CM

## 2019-07-19 DIAGNOSIS — Z12.31 ENCOUNTER FOR SCREENING MAMMOGRAM FOR MALIGNANT NEOPLASM OF BREAST: ICD-10-CM

## 2019-07-19 DIAGNOSIS — D21.9 FIBROIDS: ICD-10-CM

## 2019-07-19 DIAGNOSIS — Z30.41 ENCOUNTER FOR SURVEILLANCE OF CONTRACEPTIVE PILLS: ICD-10-CM

## 2019-07-19 PROCEDURE — 99396 PREV VISIT EST AGE 40-64: CPT | Performed by: OBSTETRICS & GYNECOLOGY

## 2019-07-19 RX ORDER — NORETHINDRONE ACETATE AND ETHINYL ESTRADIOL 1; 20 MG/1; UG/1
TABLET ORAL
Qty: 84 TABLET | Refills: 3 | Status: SHIPPED | OUTPATIENT
Start: 2019-07-19 | End: 2020-03-13

## 2019-07-19 RX ORDER — BIOTIN 10000 MCG
CAPSULE ORAL
COMMUNITY

## 2019-07-19 NOTE — PATIENT INSTRUCTIONS
Thank you for your confidence in our team    We appreciate you and welcome your feedback  If you receive a survey from us, please take a few moments to let us know how we are doing     Sincerely,   Popeye Walton MD

## 2019-07-19 NOTE — PROGRESS NOTES
ASSESSMENT & PLAN: Sarah Beth Moscoso is a 40 y o   with normal gynecologic exam     1   Routine well woman exam done today  2  Pap and HPV:  The patient's last pap was 2018  It was normal   Pap was done today  Current ASCCP Guidelines reviewed  Requests annual paps  3  Mammogram ordered due May 2020  4  The following were reviewed in today's visit: breast self exam and mammography screening ordered  CC:  Annual Gynecologic Examination    HPI: Sarah Beth Moscoso is a 40 y o  Vika Cate who presents for annual gynecologic examination  She has the following concerns:  Doing well on OCPs  Small uterine fibroid found on MRA of hip  Will check ultrasound    The following portions of the patient's history were reviewed and updated as appropriate: allergies, current medications, past family history, past medical history, past social history, past surgical history and problem list       Health Maintenance:    She wears her seatbelt routinely  She does perform regular monthly self breast exams  She feels safe at home  Last PAP: , paps annually  Last mammogram:     Past Medical History:   Diagnosis Date    GERD (gastroesophageal reflux disease)     Mal de debarquement        Past Surgical History:   Procedure Laterality Date    CERVIX SURGERY      FL INJECTION LEFT HIP (ARTHROGRAM)  2019       Past OB/Gyn History:  OB History        0    Para   0    Term   0       0    AB   0    Living   0       SAB   0    TAB   0    Ectopic   0    Multiple   0    Live Births   0               History of abnormal pap smears: Yes prior LEEP  Patient is currently sexually active    heterosexual     Family History   Problem Relation Age of Onset    No Known Problems Mother     No Known Problems Father     Stroke Maternal Grandmother     Lung cancer Maternal Grandfather        Social History:  Social History     Socioeconomic History    Marital status: /Civil Union Spouse name: Not on file    Number of children: Not on file    Years of education: Not on file    Highest education level: Not on file   Occupational History    Not on file   Social Needs    Financial resource strain: Not on file    Food insecurity:     Worry: Not on file     Inability: Not on file    Transportation needs:     Medical: Not on file     Non-medical: Not on file   Tobacco Use    Smoking status: Former Smoker    Smokeless tobacco: Never Used   Substance and Sexual Activity    Alcohol use: Yes     Comment: socially    Drug use: No    Sexual activity: Yes     Partners: Male     Birth control/protection: Pill   Lifestyle    Physical activity:     Days per week: Not on file     Minutes per session: Not on file    Stress: Not on file   Relationships    Social connections:     Talks on phone: Not on file     Gets together: Not on file     Attends [employer] service: Not on file     Active member of club or organization: Not on file     Attends meetings of clubs or organizations: Not on file     Relationship status: Not on file    Intimate partner violence:     Fear of current or ex partner: Not on file     Emotionally abused: Not on file     Physically abused: Not on file     Forced sexual activity: Not on file   Other Topics Concern    Not on file   Social History Narrative    Not on file         Allergies   Allergen Reactions    Latex Anaphylaxis    Augmentin [Amoxicillin-Pot Clavulanate] Hives    Codeine Hives    Gadavist [Gadobutrol] Rash    Multihance [Gadobenate] Rash         Current Outpatient Medications:     Biotin 10 MG CAPS, Take by mouth, Disp: , Rfl:     diazepam (VALIUM) 2 mg tablet, Take one tab by mouth nightly for 2 weeks, then use every 6 hours as needed for vertigo, Disp: , Rfl:     LORazepam (ATIVAN) 0 5 mg tablet, Take by mouth, Disp: , Rfl:     meclizine (ANTIVERT) 25 mg tablet, , Disp: , Rfl:     norethindrone-ethinyl estradiol-ferrous fumarate (LOESTIN 24 FE) 1-20 MG-MCG(24) per tablet, Take only active pills continiously, Disp: 96 tablet, Rfl: 3    ondansetron (ZOFRAN ODT) 4 mg disintegrating tablet, , Disp: , Rfl:     valACYclovir (VALTREX) 1,000 mg tablet, , Disp: , Rfl:     ZOLMitriptan (ZOMIG ZMT) 2 5 MG disintegrating tablet, , Disp: , Rfl:     ZOLMitriptan (ZOMIG-ZMT) 5 MG disintegrating tablet, , Disp: , Rfl:     JUNEL 1/20 1-20 MG-MCG per tablet, Take continuously, do not take placebos, Disp: 84 tablet, Rfl: 3    Review of Systems  Constitutional :no fever, feels well, no tiredness, no recent weight gain or loss  ENT: no ear ache, no loss of hearing, no nosebleeds or nasal discharge, no sore throat or hoarseness  Cardiovascular: no complaints of slow or fast heart beat, no chest pain, no palpitations, no leg claudication or lower extremity edema  Respiratory: no complaints of shortness of shortness of breath, no CARRERA  Breasts:no complaints of breast pain, breast lump, or nipple discharge  Gastrointestinal: no complaints of abdominal pain, constipation, nausea, vomiting, or diarrhea or bloody stools  Genitourinary : no complaints of dysuria, incontinence, pelvic pain, no dysmenorrhea, vaginal discharge or abnormal vaginal bleeding and as noted in HPI  Musculoskeletal: no complaints of arthralgia, no myalgia, no joint swelling or stiffness, no limb pain or swelling    Integumentary: no complaints of skin rash or lesion, itching or dry skin  Neurological: no complaints of headache, no confusion, no numbness or tingling, no dizziness or fainting    Objective      /60   Wt 73 6 kg (162 lb 4 8 oz)   BMI 29 69 kg/m²   General:   appears stated age, cooperative, alert normal mood and affect   Neck: normal, supple,trachea midline, no masses   Heart: regular rate and rhythm, S1, S2 normal, no murmur, click, rub or gallop   Lungs: clear to auscultation bilaterally   Breasts: normal appearance, no masses or tenderness, Inspection negative, No nipple retraction or dimpling, No nipple discharge or bleeding   Abdomen: soft, non-tender, without masses or organomegaly   Vulva: normal female genitalia, Bartholin's, Urethra, [place] normal, no lesions   Vagina: normal vagina, no discharge, exudate, lesion, or erythema   Urethra: normal   Cervix: Normal, no discharge  PAP done  Uterus: normal size, contour, position, consistency, mobility, non-tender   Adnexa: no mass, fullness, tenderness   Lymphatic palpation of lymph nodes in neck, axilla, groin and/or other locations: no lymphadenopathy or masses noted   Skin normal skin turgor and no rashes     Psychiatric orientation to person, place, and time: normal  mood and affect: normal

## 2019-07-24 ENCOUNTER — HOSPITAL ENCOUNTER (OUTPATIENT)
Dept: ULTRASOUND IMAGING | Facility: HOSPITAL | Age: 44
Discharge: HOME | End: 2019-07-24
Attending: OBSTETRICS & GYNECOLOGY
Payer: COMMERCIAL

## 2019-07-24 DIAGNOSIS — D21.9 FIBROIDS: ICD-10-CM

## 2019-07-24 LAB
CLINICAL INFO: NORMAL
CYTO CVX: NORMAL
CYTOLOGY CMNT CVX/VAG CYTO-IMP: NORMAL
DATE PREVIOUS BX: NORMAL
HPV E6+E7 MRNA CVX QL NAA+PROBE: NOT DETECTED
LMP START DATE: NORMAL
SL AMB PREV. PAP:: NORMAL
SPECIMEN SOURCE CVX/VAG CYTO: NORMAL

## 2019-07-24 PROCEDURE — 76856 US EXAM PELVIC COMPLETE: CPT

## 2019-07-24 PROCEDURE — 76830 TRANSVAGINAL US NON-OB: CPT

## 2019-07-26 ENCOUNTER — OFFICE VISIT (OUTPATIENT)
Dept: URGENT CARE | Facility: CLINIC | Age: 44
End: 2019-07-26
Payer: COMMERCIAL

## 2019-07-26 VITALS
BODY MASS INDEX: 28.35 KG/M2 | HEART RATE: 88 BPM | RESPIRATION RATE: 20 BRPM | HEIGHT: 63 IN | DIASTOLIC BLOOD PRESSURE: 96 MMHG | OXYGEN SATURATION: 99 % | SYSTOLIC BLOOD PRESSURE: 137 MMHG | TEMPERATURE: 100 F | WEIGHT: 160 LBS

## 2019-07-26 DIAGNOSIS — R42 DIZZY: Primary | ICD-10-CM

## 2019-07-26 DIAGNOSIS — R10.84 GENERALIZED ABDOMINAL PAIN: ICD-10-CM

## 2019-07-26 PROCEDURE — G0382 LEV 3 HOSP TYPE B ED VISIT: HCPCS | Performed by: PHYSICIAN ASSISTANT

## 2019-07-26 NOTE — PROGRESS NOTES
[address] [facility]  (office) [phone]  (fax) [phone]        NAME: Asad Munguia is a 40 y o  female  : 1975    MRN: [id number]  DATE: 2019  TIME: 3:48 PM    Assessment and Plan   Dizzy [R42]  1  Dizzy     2  Generalized abdominal pain         Patient Instructions   If the pain begins to localize especially in the RLQ or if the pain worsens, to present to the ER for further evaluation  If headache worsens and is not improved by Valium as it usually is to present to ER  Can use Excedrin for headache or Zomig  Follow up with PCP in 2-3 days  To present to the ER if symptoms worsen  Chief Complaint     Chief Complaint   Patient presents with    Headache     x 3 days Hx migraines    Dizziness         History of Present Illness   Asad Munguia presents to the clinic c/o    Denies any chest pain or SOB  Headache    This is a recurrent problem  The current episode started 1 to 4 weeks ago  The problem occurs intermittently  The problem has been unchanged  The pain is located in the frontal region  The pain does not radiate  The pain quality is similar to prior headaches  The pain is at a severity of 4/10  The pain is mild  Associated symptoms include dizziness  Pertinent negatives include no abdominal pain, anorexia, back pain, blurred vision, coughing, ear pain, eye pain, eye redness, facial sweating, fever, hearing loss, nausea, neck pain, numbness, photophobia, rhinorrhea, sinus pressure, sore throat, swollen glands, vomiting or weight loss  Nothing aggravates the symptoms  She has tried nothing for the symptoms  The treatment provided no relief  Her past medical history is significant for migraine headaches and recent head traumas (hit her head around a week ago)  ( mal de debarquement syndrome (MdDS) )   Abdominal Pain   This is a new problem  The current episode started yesterday   The onset quality is gradual  The problem occurs intermittently  The problem has been rapidly improving  The pain is located in the generalized abdominal region  The pain is at a severity of 1/10 (only if she pushes on her abdomen)  The pain is mild  The quality of the pain is aching  The abdominal pain does not radiate  Associated symptoms include diarrhea and headaches  Pertinent negatives include no anorexia, arthralgias, belching, constipation, dysuria, fever, flatus, frequency, hematochezia, hematuria, melena, myalgias, nausea, vomiting or weight loss  The pain is aggravated by palpation  She has tried nothing for the symptoms  The treatment provided no relief  Her past medical history is significant for irritable bowel syndrome  mal de debarquement syndrome (MdDS)        Review of Systems   Review of Systems   Constitutional: Negative for activity change, appetite change, chills, diaphoresis, fatigue, fever and weight loss  HENT: Negative for congestion, ear discharge, ear pain, facial swelling, hearing loss, rhinorrhea, sinus pressure, sinus pain, sneezing and sore throat  Eyes: Negative for blurred vision, photophobia, pain, discharge, redness, itching and visual disturbance  Respiratory: Negative for apnea, cough, chest tightness, shortness of breath and wheezing  Cardiovascular: Negative for chest pain, palpitations and leg swelling  Gastrointestinal: Positive for diarrhea  Negative for abdominal distention, abdominal pain, anorexia, constipation, flatus, hematochezia, melena, nausea and vomiting  Genitourinary: Negative for dysuria, flank pain, frequency, hematuria and urgency  Musculoskeletal: Negative for arthralgias, back pain, gait problem, joint swelling, myalgias, neck pain and neck stiffness  Skin: Negative for color change, rash and wound  Allergic/Immunologic: Negative for immunocompromised state  Neurological: Positive for dizziness and headaches   Negative for facial asymmetry, speech difficulty, light-headedness and numbness  Hematological: Negative for adenopathy  Psychiatric/Behavioral: Negative for confusion           Current Medications     Long-Term Medications   Medication Sig Dispense Refill    Biotin 10 MG CAPS Take by mouth      diazepam (VALIUM) 2 mg tablet Take one tab by mouth nightly for 2 weeks, then use every 6 hours as needed for vertigo      JUNEL 1/20 1-20 MG-MCG per tablet Take continuously, do not take placebos 84 tablet 3    LORazepam (ATIVAN) 0 5 mg tablet Take by mouth      meclizine (ANTIVERT) 25 mg tablet       ondansetron (ZOFRAN ODT) 4 mg disintegrating tablet       valACYclovir (VALTREX) 1,000 mg tablet       ZOLMitriptan (ZOMIG ZMT) 2 5 MG disintegrating tablet       ZOLMitriptan (ZOMIG-ZMT) 5 MG disintegrating tablet          Current Allergies     Allergies as of 07/26/2019 - Reviewed 07/26/2019   Allergen Reaction Noted    Latex Anaphylaxis 08/10/2017    Augmentin [amoxicillin-pot clavulanate] Hives 06/19/2019    Codeine Hives 08/10/2017    Gadavist [gadobutrol] Rash 06/19/2019    Multihance [gadobenate] Rash 06/19/2019            The following portions of the patient's history were reviewed and updated as appropriate: allergies, current medications, past family history, past medical history, past social history, past surgical history and problem list   Past Medical History:   Diagnosis Date    GERD (gastroesophageal reflux disease)     Mal de debarquement     Migraine      Past Surgical History:   Procedure Laterality Date    CERVIX SURGERY      FL INJECTION LEFT HIP (ARTHROGRAM)  6/25/2019     Social History     Socioeconomic History    Marital status: /Civil Union     Spouse name: Not on file    Number of children: Not on file    Years of education: Not on file    Highest education level: Not on file   Occupational History    Not on file   Social Needs    Financial resource strain: Not on file    Food insecurity:     Worry: Not on file Inability: Not on file    Transportation needs:     Medical: Not on file     Non-medical: Not on file   Tobacco Use    Smoking status: Former Smoker    Smokeless tobacco: Never Used   Substance and Sexual Activity    Alcohol use: Yes     Comment: socially    Drug use: No    Sexual activity: Yes     Partners: Male     Birth control/protection: Pill   Lifestyle    Physical activity:     Days per week: Not on file     Minutes per session: Not on file    Stress: Not on file   Relationships    Social connections:     Talks on phone: Not on file     Gets together: Not on file     Attends Restoration service: Not on file     Active member of club or organization: Not on file     Attends meetings of clubs or organizations: Not on file     Relationship status: Not on file    Intimate partner violence:     Fear of current or ex partner: Not on file     Emotionally abused: Not on file     Physically abused: Not on file     Forced sexual activity: Not on file   Other Topics Concern    Not on file   Social History Narrative    Not on file       Objective   /96 (BP Location: Right arm, Patient Position: Sitting, Cuff Size: Large)   Pulse 88   Temp 100 °F (37 8 °C) (Tympanic)   Resp 20   Ht 5' 3" (1 6 m)   Wt 72 6 kg (160 lb)   SpO2 99%   BMI 28 34 kg/m²      Physical Exam     Physical Exam   Constitutional: She is oriented to person, place, and time  She appears well-developed and well-nourished  No distress  HENT:   Head: Normocephalic and atraumatic  Right Ear: Tympanic membrane and external ear normal    Left Ear: Tympanic membrane and external ear normal    Nose: Nose normal    Mouth/Throat: Oropharynx is clear and moist  No oropharyngeal exudate  Eyes: Pupils are equal, round, and reactive to light  Conjunctivae and EOM are normal  Right eye exhibits no discharge  Left eye exhibits no discharge  No scleral icterus  Neck: Normal range of motion  Neck supple  No JVD present   No tracheal deviation present  No thyromegaly present  Cardiovascular: Normal rate, regular rhythm and normal heart sounds  Exam reveals no gallop and no friction rub  No murmur heard  Pulmonary/Chest: Effort normal and breath sounds normal  No stridor  No respiratory distress  She has no decreased breath sounds  She has no wheezes  She has no rhonchi  She has no rales  She exhibits no tenderness  Musculoskeletal: Normal range of motion  She exhibits no tenderness or deformity  Lymphadenopathy:     She has no cervical adenopathy  Neurological: She is alert and oriented to person, place, and time  Coordination normal    Skin: Skin is warm and dry  No rash noted  She is not diaphoretic  No erythema  No pallor  Psychiatric: She has a normal mood and affect  Her behavior is normal  Judgment and thought content normal    Nursing note and vitals reviewed        Marty Nuñez PA-C

## 2019-07-27 NOTE — RESULT ENCOUNTER NOTE
Please inform patient US reviewed , small fibroid present / if desires to discuss further can make follow up appointment

## 2019-12-31 ENCOUNTER — TRANSCRIBE ORDERS (OUTPATIENT)
Dept: ADMINISTRATIVE | Facility: HOSPITAL | Age: 44
End: 2019-12-31

## 2019-12-31 DIAGNOSIS — R10.84 ABDOMINAL PAIN, GENERALIZED: Primary | ICD-10-CM

## 2020-01-02 ENCOUNTER — HOSPITAL ENCOUNTER (OUTPATIENT)
Dept: ULTRASOUND IMAGING | Facility: HOSPITAL | Age: 45
Discharge: HOME | End: 2020-01-02
Payer: COMMERCIAL

## 2020-01-02 DIAGNOSIS — R10.84 ABDOMINAL PAIN, GENERALIZED: ICD-10-CM

## 2020-01-02 PROCEDURE — 76700 US EXAM ABDOM COMPLETE: CPT

## 2020-01-09 ENCOUNTER — HOSPITAL ENCOUNTER (EMERGENCY)
Facility: HOSPITAL | Age: 45
Discharge: HOME | End: 2020-01-09
Attending: EMERGENCY MEDICINE | Admitting: EMERGENCY MEDICINE
Payer: COMMERCIAL

## 2020-01-09 ENCOUNTER — APPOINTMENT (EMERGENCY)
Dept: RADIOLOGY | Facility: HOSPITAL | Age: 45
End: 2020-01-09
Payer: COMMERCIAL

## 2020-01-09 VITALS
HEART RATE: 78 BPM | SYSTOLIC BLOOD PRESSURE: 141 MMHG | TEMPERATURE: 98.7 F | RESPIRATION RATE: 18 BRPM | DIASTOLIC BLOOD PRESSURE: 68 MMHG | OXYGEN SATURATION: 97 %

## 2020-01-09 DIAGNOSIS — R42 LIGHTHEADEDNESS: Primary | ICD-10-CM

## 2020-01-09 LAB
ALBUMIN SERPL BCP-MCNC: 3.6 G/DL (ref 3.5–5)
ALP SERPL-CCNC: 54 U/L (ref 46–116)
ALT SERPL W P-5'-P-CCNC: 26 U/L (ref 12–78)
ANION GAP SERPL CALCULATED.3IONS-SCNC: 9 MMOL/L (ref 4–13)
AST SERPL W P-5'-P-CCNC: 25 U/L (ref 5–45)
BASOPHILS # BLD AUTO: 0.04 THOUSANDS/ΜL (ref 0–0.1)
BASOPHILS NFR BLD AUTO: 1 % (ref 0–1)
BILIRUB SERPL-MCNC: 0.9 MG/DL (ref 0.2–1)
BUN SERPL-MCNC: 12 MG/DL (ref 5–25)
CALCIUM SERPL-MCNC: 9.3 MG/DL (ref 8.3–10.1)
CHLORIDE SERPL-SCNC: 103 MMOL/L (ref 100–108)
CO2 SERPL-SCNC: 25 MMOL/L (ref 21–32)
CREAT SERPL-MCNC: 0.95 MG/DL (ref 0.6–1.3)
EOSINOPHIL # BLD AUTO: 0.08 THOUSAND/ΜL (ref 0–0.61)
EOSINOPHIL NFR BLD AUTO: 1 % (ref 0–6)
ERYTHROCYTE [DISTWIDTH] IN BLOOD BY AUTOMATED COUNT: 11.3 % (ref 11.6–15.1)
GFR SERPL CREATININE-BSD FRML MDRD: 73 ML/MIN/1.73SQ M
GLUCOSE SERPL-MCNC: 113 MG/DL (ref 65–140)
GLUCOSE SERPL-MCNC: 95 MG/DL (ref 65–140)
HCT VFR BLD AUTO: 47.8 % (ref 34.8–46.1)
HGB BLD-MCNC: 16 G/DL (ref 11.5–15.4)
HOLD SPECIMEN: NORMAL
IMM GRANULOCYTES # BLD AUTO: 0.03 THOUSAND/UL (ref 0–0.2)
IMM GRANULOCYTES NFR BLD AUTO: 0 % (ref 0–2)
LIPASE SERPL-CCNC: 279 U/L (ref 73–393)
LYMPHOCYTES # BLD AUTO: 1.73 THOUSANDS/ΜL (ref 0.6–4.47)
LYMPHOCYTES NFR BLD AUTO: 22 % (ref 14–44)
MAGNESIUM SERPL-MCNC: 2.1 MG/DL (ref 1.6–2.6)
MCH RBC QN AUTO: 32.8 PG (ref 26.8–34.3)
MCHC RBC AUTO-ENTMCNC: 33.5 G/DL (ref 31.4–37.4)
MCV RBC AUTO: 98 FL (ref 82–98)
MONOCYTES # BLD AUTO: 0.62 THOUSAND/ΜL (ref 0.17–1.22)
MONOCYTES NFR BLD AUTO: 8 % (ref 4–12)
NEUTROPHILS # BLD AUTO: 5.28 THOUSANDS/ΜL (ref 1.85–7.62)
NEUTS SEG NFR BLD AUTO: 68 % (ref 43–75)
NRBC BLD AUTO-RTO: 0 /100 WBCS
PLATELET # BLD AUTO: 228 THOUSANDS/UL (ref 149–390)
PMV BLD AUTO: 10.4 FL (ref 8.9–12.7)
POTASSIUM SERPL-SCNC: 3.6 MMOL/L (ref 3.5–5.3)
PROT SERPL-MCNC: 7.4 G/DL (ref 6.4–8.2)
RBC # BLD AUTO: 4.88 MILLION/UL (ref 3.81–5.12)
SODIUM SERPL-SCNC: 137 MMOL/L (ref 136–145)
TROPONIN I SERPL-MCNC: <0.02 NG/ML
WBC # BLD AUTO: 7.78 THOUSAND/UL (ref 4.31–10.16)

## 2020-01-09 PROCEDURE — 99285 EMERGENCY DEPT VISIT HI MDM: CPT | Performed by: EMERGENCY MEDICINE

## 2020-01-09 PROCEDURE — 82948 REAGENT STRIP/BLOOD GLUCOSE: CPT

## 2020-01-09 PROCEDURE — 85025 COMPLETE CBC W/AUTO DIFF WBC: CPT

## 2020-01-09 PROCEDURE — 83735 ASSAY OF MAGNESIUM: CPT | Performed by: EMERGENCY MEDICINE

## 2020-01-09 PROCEDURE — 36415 COLL VENOUS BLD VENIPUNCTURE: CPT

## 2020-01-09 PROCEDURE — 84484 ASSAY OF TROPONIN QUANT: CPT

## 2020-01-09 PROCEDURE — 80053 COMPREHEN METABOLIC PANEL: CPT

## 2020-01-09 PROCEDURE — 71046 X-RAY EXAM CHEST 2 VIEWS: CPT

## 2020-01-09 PROCEDURE — 93005 ELECTROCARDIOGRAM TRACING: CPT

## 2020-01-09 PROCEDURE — 99285 EMERGENCY DEPT VISIT HI MDM: CPT

## 2020-01-09 PROCEDURE — 83690 ASSAY OF LIPASE: CPT | Performed by: EMERGENCY MEDICINE

## 2020-01-09 RX ORDER — NORETHINDRONE ACETATE AND ETHINYL ESTRADIOL 1; .02 MG/1; MG/1
TABLET ORAL
COMMUNITY
Start: 2014-04-01

## 2020-01-09 RX ORDER — RANITIDINE HCL 75 MG
75 TABLET ORAL 2 TIMES DAILY
COMMUNITY

## 2020-01-09 NOTE — ED PROVIDER NOTES
History  Chief Complaint   Patient presents with    Dizziness     Patient began to feel dizzy after eating, patient also admits to throat discomfort      Patient: Narinder Oconnell  61 y o /female  YOB: 1975  MRN: [id number]  PCP: Grace Dietz PA-C  Date of evaluation: 1/9/2020    (N B   [address] may have been used in the preparation of this document  Occasional wrong word or "sound-alike" substitutions may have occurred due to the inherent limitations of voice recognition software  Interpretation should be guided by context )    History provided by:  Patient  Dizziness   Quality:  Lightheadedness  Severity:  Moderate  Onset quality:  Sudden  Timing:  Constant  Progression:  Partially resolved  Chronicity:  Recurrent (6 episodes)  Context comment:  At rest, within about 45 min after eating  Worsened by:  Nothing  Ineffective treatments:  None tried  Associated symptoms: no blood in stool, no chest pain, no diarrhea, no headaches, no hearing loss, no nausea, no palpitations, no shortness of breath, no syncope, no tinnitus, no vomiting and no weakness  Visual change: got "whitish"    Risk factors: hx of vertigo (past)        Prior to Admission Medications   Prescriptions Last Dose Informant Patient Reported? Taking?    Biotin 10 MG CAPS   Yes No   Sig: Take by mouth   JUNEL 1/20 1-20 MG-MCG per tablet   No Yes   Sig: Take continuously, do not take placebos   LORazepam (ATIVAN) 0 5 mg tablet   Yes No   Sig: Take by mouth   ZOLMitriptan (ZOMIG ZMT) 2 5 MG disintegrating tablet   Yes No   ZOLMitriptan (ZOMIG-ZMT) 5 MG disintegrating tablet   Yes No   diazepam (VALIUM) 2 mg tablet   Yes No   Sig: Take one tab by mouth nightly for 2 weeks, then use every 6 hours as needed for vertigo   meclizine (ANTIVERT) 25 mg tablet   Yes No   norethindrone-ethinyl estradiol (JUNEL 1/20) 1-20 MG-MCG per tablet   Yes Yes   Sig: Take by mouth   ondansetron (ZOFRAN ODT) 4 mg disintegrating tablet Yes No   psyllium (METAMUCIL) 58 6 % powder   Yes Yes   Sig: Take 1 packet by mouth 3 (three) times a day   ranitidine (ZANTAC) 75 MG tablet   Yes Yes   Sig: Take 75 mg by mouth 2 (two) times a day   valACYclovir (VALTREX) 1,000 mg tablet   Yes No      Facility-Administered Medications: None       Past Medical History:   Diagnosis Date    GERD (gastroesophageal reflux disease)     Mal de debarquement     Migraine        Past Surgical History:   Procedure Laterality Date    CERVIX SURGERY      FL INJECTION LEFT HIP (ARTHROGRAM)  6/25/2019       Family History   Problem Relation Age of Onset    No Known Problems Mother     No Known Problems Father     Stroke Maternal Grandmother     Lung cancer Maternal Grandfather      I have reviewed and agree with the history as documented  Social History     Tobacco Use    Smoking status: Former Smoker    Smokeless tobacco: Never Used   Substance Use Topics    Alcohol use: Yes     Comment: socially    Drug use: No        Review of Systems   Constitutional: Negative for chills and fever  HENT: Negative for hearing loss, tinnitus, trouble swallowing and voice change  Eyes: Negative for pain, redness and visual disturbance  Respiratory: Negative for cough and shortness of breath  Cardiovascular: Negative for chest pain, palpitations and syncope  Gastrointestinal: Negative for abdominal pain, blood in stool, constipation, diarrhea, nausea and vomiting  Genitourinary: Negative for dysuria, hematuria, vaginal bleeding and vaginal discharge  Musculoskeletal: Negative for back pain, gait problem and neck pain  Skin: Negative for color change and rash  Neurological: Positive for dizziness  Negative for weakness, light-headedness and headaches  Psychiatric/Behavioral: Negative for confusion and decreased concentration  The patient is not nervous/anxious  All other systems reviewed and are negative        Physical Exam  Physical Exam Constitutional: She is oriented to person, place, and time  She appears well-developed and well-nourished  HENT:   Mouth/Throat: Oropharynx is clear and moist and mucous membranes are normal    Voice normal   Eyes: Pupils are equal, round, and reactive to light  EOM are normal    Cardiovascular: Normal rate and regular rhythm  Pulmonary/Chest: Effort normal    Abdominal: Soft  Bowel sounds are normal    Neurological: She is alert and oriented to person, place, and time  GCS eye subscore is 4  GCS verbal subscore is 5  GCS motor subscore is 6  Skin: Skin is warm and dry  Psychiatric: She has a normal mood and affect  Her speech is normal and behavior is normal    Nursing note and vitals reviewed        Vital Signs  ED Triage Vitals [01/09/20 1413]   Temperature Pulse Respirations Blood Pressure SpO2   98 7 °F (37 1 °C) 87 19 (!) 176/92 100 %      Temp Source Heart Rate Source Patient Position - Orthostatic VS BP Location FiO2 (%)   Temporal Monitor Sitting Right arm --      Pain Score       --           Vitals:    01/09/20 1542 01/09/20 1730 01/09/20 1800 01/09/20 1858   BP: 136/73 126/76 148/87 141/68   Pulse: 86 76 79 78   Patient Position - Orthostatic VS: Standing - Orthostatic VS Lying Lying Lying         Visual Acuity  Visual Acuity      Most Recent Value   L Pupil Size (mm)  3   R Pupil Size (mm)  3          ED Medications  Medications - No data to display    Diagnostic Studies  Results Reviewed     Procedure Component Value Units Date/Time    Lipase [[id number]]  (Normal) Collected:  01/09/20 1712    Lab Status:  Final result Specimen:  Blood Updated:  01/09/20 1749     Lipase 279 u/L     Fingerstick Glucose (POCT) [[id number]]  (Normal) Collected:  01/09/20 1743    Lab Status:  Final result Updated:  01/09/20 1747     POC Glucose 95 mg/dl     Magnesium [[id number]]  (Normal) Collected:  01/09/20 1434    Lab Status:  Final result Specimen:  Blood from Arm, Right Updated:  01/09/20 1523     Magnesium 2 1 mg/dL     Comprehensive metabolic panel [[id number]] Collected:  01/09/20 1434    Lab Status:  Final result Specimen:  Blood from Arm, Right Updated:  01/09/20 1510     Sodium 137 mmol/L      Potassium 3 6 mmol/L      Chloride 103 mmol/L      CO2 25 mmol/L      ANION GAP 9 mmol/L      BUN 12 mg/dL      Creatinine 0 95 mg/dL      Glucose 113 mg/dL      Calcium 9 3 mg/dL      AST 25 U/L      ALT 26 U/L      Alkaline Phosphatase 54 U/L      Total Protein 7 4 g/dL      Albumin 3 6 g/dL      Total Bilirubin 0 90 mg/dL      eGFR 73 ml/min/1 73sq m     Narrative:       National Kidney Disease Foundation guidelines for Chronic Kidney Disease (CKD):     Stage 1 with normal or high GFR (GFR > 90 mL/min/1 73 square meters)    Stage 2 Mild CKD (GFR = 60-89 mL/min/1 73 square meters)    Stage 3A Moderate CKD (GFR = 45-59 mL/min/1 73 square meters)    Stage 3B Moderate CKD (GFR = 30-44 mL/min/1 73 square meters)    Stage 4 Severe CKD (GFR = 15-29 mL/min/1 73 square meters)    Stage 5 End Stage CKD (GFR <15 mL/min/1 73 square meters)  Note: GFR calculation is accurate only with a steady state creatinine    Troponin I [[id number]]  (Normal) Collected:  01/09/20 1434    Lab Status:  Final result Specimen:  Blood from Arm, Right Updated:  01/09/20 1510     Troponin I <0 02 ng/mL     CBC and differential [[id number]]  (Abnormal) Collected:  01/09/20 1434    Lab Status:  Final result Specimen:  Blood from Arm, Right Updated:  01/09/20 1450     WBC 7 78 Thousand/uL      RBC 4 88 Million/uL      Hemoglobin 16 0 g/dL      Hematocrit 47 8 %      MCV 98 fL      MCH 32 8 pg      MCHC 33 5 g/dL      RDW 11 3 %      MPV 10 4 fL      Platelets 475 Thousands/uL      nRBC 0 /100 WBCs      Neutrophils Relative 68 %      Immat GRANS % 0 %      Lymphocytes Relative 22 %      Monocytes Relative 8 %      Eosinophils Relative 1 %      Basophils Relative 1 %      Neutrophils Absolute 5 28 Thousands/µL      Immature Grans Absolute 0 03 Thousand/uL Lymphocytes Absolute 1 73 Thousands/µL      Monocytes Absolute 0 62 Thousand/µL      Eosinophils Absolute 0 08 Thousand/µL      Basophils Absolute 0 04 Thousands/µL                  XR chest 2 views   Final Result by Kenny Dickens MD (01/09 1628)      No acute cardiopulmonary disease  Workstation performed: [id number]                    Procedures  ECG 12 Lead Documentation Only  Date/Time: 1/9/2020 5:51 PM  Performed by: Fuentes Rondon MD  Authorized by: Fuentes Rondon MD     Indications / Diagnosis:  Diaphoresis  ECG reviewed by me, the ED Provider: yes    Patient location:  ED  Previous ECG:     Comparison to cardiac monitor: Yes    Interpretation:     Interpretation: normal    Rate:     ECG rate:  75    ECG rate assessment: normal    Rhythm:     Rhythm: sinus rhythm    Ectopy:     Ectopy: none    QRS:     QRS axis:  Normal    QRS intervals:  Normal  Conduction:     Conduction: normal    ST segments:     ST segments:  Normal  T waves:     T waves: normal               ED Course  ED Course as of Jan 11 0651   Thu Jan 09, 2020   1515 WBC: 7 78   1515 Hemoglobin(!): 16 0   1515 Platelet Count: 844   1516 Sodium: 137   1516 Potassium: 3 6   1516 Chloride: 103   1516 CO2: 25   1516 Anion Gap: 9   1516 BUN: 12   1516 Creatinine: 0 95   1516 Glucose, Random: 113   1516 Calcium: 9 3   1516 eGFR: 73   1522 WBC: 7 78   1524 Hemoglobin(!): 16 0   1524 Platelet Count: 297   1351 Troponin I: <0 02   8970 D/W On-call Neurologist, who reviewed pts records as well as current symptoms  Normal MRA carotids 2018  Would not recommend we re-image at this time  Would recommend Cardiology followup              HEART Risk Score      Most Recent Value   History  0 Filed at: 01/09/2020 1551   ECG  0 Filed at: 01/09/2020 1551   Age  0 Filed at: 01/09/2020 1551   Risk Factors  0 Filed at: 01/09/2020 1551   Troponin  0 Filed at: 01/09/2020 1551   Heart Score Risk Calculator   History  0 Filed at: 01/09/2020 1551   ECG  0 Filed at: 01/09/2020 1551   Age  0 Filed at: 01/09/2020 1551   Risk Factors  0 Filed at: 01/09/2020 1551   Troponin  0 Filed at: 01/09/2020 1551   HEART Score  0 Filed at: 01/09/2020 1551   HEART Score  0 Filed at: 01/09/2020 1551                            [facility]  Number of Diagnoses or Management Options  Lightheadedness:      Amount and/or Complexity of Data Reviewed  Tests in the radiology section of CPT®: ordered and reviewed  Tests in the medicine section of CPT®: ordered and reviewed  Discuss the patient with other providers: yes  Independent visualization of images, tracings, or specimens: yes    Risk of Complications, Morbidity, and/or Mortality  Presenting problems: high    Patient Progress  Patient progress: stable        Disposition  Final diagnoses:   Lightheadedness     Time reflects when diagnosis was documented in both MDM as applicable and the Disposition within this note     Time User Action Codes Description Comment    1/9/2020  5:19 PM Dianne Farooq Add [R42] Lightheadedness       ED Disposition     ED Disposition Condition Date/Time Comment    Discharge Stable Thu Jan 9, 2020  6:06 PM Pepito Bradford discharge to home/self care  Follow-up Information     Follow up With Specialties Details Why Contact Info    Saeid Wadsworth PA-C Family Medicine, Physician Assistant   [address] [address]      your Neurologist              Discharge Medication List as of 1/9/2020  6:06 PM      CONTINUE these medications which have NOT CHANGED    Details   !!  JUNEL 1/20 1-20 MG-MCG per tablet Take continuously, do not take placebos, Normal      !! norethindrone-ethinyl estradiol (JUNEL 1/20) 1-20 MG-MCG per tablet Take by mouth, Starting Tue 4/1/2014, Historical Med      psyllium (METAMUCIL) 58 6 % powder Take 1 packet by mouth 3 (three) times a day, Historical Med      ranitidine (ZANTAC) 75 MG tablet Take 75 mg by mouth 2 (two) times a day, Historical Med      Biotin 10 MG CAPS Take by mouth, Historical Med      diazepam (VALIUM) 2 mg tablet Take one tab by mouth nightly for 2 weeks, then use every 6 hours as needed for vertigo, Historical Med      LORazepam (ATIVAN) 0 5 mg tablet Take by mouth, Historical Med      meclizine (ANTIVERT) 25 mg tablet Starting Mon 6/4/2018, Historical Med      ondansetron (ZOFRAN ODT) 4 mg disintegrating tablet Historical Med      valACYclovir (VALTREX) 1,000 mg tablet Starting Mon 6/4/2018, Historical Med      !! ZOLMitriptan (ZOMIG ZMT) 2 5 MG disintegrating tablet Historical Med      !! ZOLMitriptan (ZOMIG-ZMT) 5 MG disintegrating tablet Starting Mon 5/14/2018, Historical Med       !! - Potential duplicate medications found  Please discuss with provider  Outpatient Discharge Orders   Ambulatory referral to Cardiology   Standing Status: Future Standing Exp  Date: 01/09/21      Holter monitor - 48 hour   Standing Status: Future Standing Exp   Date: 01/09/24       ED Provider  Electronically Signed by           Lizzie Paula MD  01/09/20 [address] [place] [address],[address] [facility], MD  01/11/20 1784

## 2020-01-09 NOTE — ED PROCEDURE NOTE
PROCEDURE  ECG 12 Lead Documentation Only  Date/Time: 1/9/2020 2:55 PM  Performed by: Lizzie Paula MD  Authorized by: Lizzie Paula MD     Indications / Diagnosis:  Lightheaded  ECG reviewed by me, the ED Provider: yes    Patient location:  ED  Previous ECG:     Comparison to cardiac monitor: Yes    Interpretation:     Interpretation: non-specific    Rate:     ECG rate:  79    ECG rate assessment: normal    Rhythm:     Rhythm: sinus rhythm    Ectopy:     Ectopy: PAC    QRS:     QRS axis:  Normal    QRS intervals:  Normal  Conduction:     Conduction: normal    ST segments:     ST segments:  Normal  T waves:     T waves: normal           Lizzie Paula MD  01/09/20 1551

## 2020-01-09 NOTE — CASE MANAGEMENT
I self referred the patient to discuss resources that might be available to her  She denied needing any help at this time  The patient lives with her  in a one story house  There is 3 MAE  She has a nebulizer she uses  She does not receive meals on wheels or home health services at this time  She is independent with her ADL's and she drives  She uses [facility] in [place]  She does not have any trouble getting or paying for her medications  She has [facility]  She messaged her PCP prior to coming to the ED today

## 2020-01-12 LAB
ATRIAL RATE: 75 BPM
ATRIAL RATE: 79 BPM
P AXIS: 26 DEGREES
P AXIS: 27 DEGREES
PR INTERVAL: 116 MS
PR INTERVAL: 120 MS
QRS AXIS: 53 DEGREES
QRS AXIS: 54 DEGREES
QRSD INTERVAL: 76 MS
QRSD INTERVAL: 78 MS
QT INTERVAL: 388 MS
QT INTERVAL: 400 MS
QTC INTERVAL: 444 MS
QTC INTERVAL: 446 MS
T WAVE AXIS: 38 DEGREES
T WAVE AXIS: 44 DEGREES
VENTRICULAR RATE: 75 BPM
VENTRICULAR RATE: 79 BPM

## 2020-01-12 PROCEDURE — 93010 ELECTROCARDIOGRAM REPORT: CPT | Performed by: INTERNAL MEDICINE

## 2020-02-07 ENCOUNTER — TRANSCRIBE ORDERS (OUTPATIENT)
Dept: ADMINISTRATIVE | Facility: HOSPITAL | Age: 45
End: 2020-02-07

## 2020-02-07 DIAGNOSIS — E04.1 THYROID NODULE: Primary | ICD-10-CM

## 2020-02-14 ENCOUNTER — HOSPITAL ENCOUNTER (OUTPATIENT)
Dept: ULTRASOUND IMAGING | Facility: HOSPITAL | Age: 45
Discharge: HOME | End: 2020-02-14
Payer: COMMERCIAL

## 2020-02-14 DIAGNOSIS — E04.1 THYROID NODULE: ICD-10-CM

## 2020-02-14 PROCEDURE — 76536 US EXAM OF HEAD AND NECK: CPT

## 2020-03-13 DIAGNOSIS — Z30.41 ENCOUNTER FOR SURVEILLANCE OF CONTRACEPTIVE PILLS: ICD-10-CM

## 2020-03-13 RX ORDER — NORETHINDRONE ACETATE AND ETHINYL ESTRADIOL 1; 20 MG/1; UG/1
TABLET ORAL
Qty: 84 TABLET | Refills: 3 | Status: SHIPPED | OUTPATIENT
Start: 2020-03-13 | End: 2020-12-21

## 2020-06-05 ENCOUNTER — HOSPITAL ENCOUNTER (OUTPATIENT)
Dept: MAMMOGRAPHY | Facility: HOSPITAL | Age: 45
Discharge: HOME | End: 2020-06-05
Attending: OBSTETRICS & GYNECOLOGY
Payer: COMMERCIAL

## 2020-06-05 VITALS — BODY MASS INDEX: 28.35 KG/M2 | HEIGHT: 63 IN | WEIGHT: 160 LBS

## 2020-06-05 DIAGNOSIS — Z12.31 ENCOUNTER FOR SCREENING MAMMOGRAM FOR MALIGNANT NEOPLASM OF BREAST: ICD-10-CM

## 2020-06-05 PROCEDURE — 77063 BREAST TOMOSYNTHESIS BI: CPT

## 2020-06-05 PROCEDURE — 77067 SCR MAMMO BI INCL CAD: CPT

## 2020-09-25 ENCOUNTER — ANNUAL EXAM (OUTPATIENT)
Dept: OBGYN CLINIC | Facility: CLINIC | Age: 45
End: 2020-09-25
Payer: COMMERCIAL

## 2020-09-25 VITALS — DIASTOLIC BLOOD PRESSURE: 80 MMHG | SYSTOLIC BLOOD PRESSURE: 125 MMHG

## 2020-09-25 DIAGNOSIS — D25.9 UTERINE LEIOMYOMA, UNSPECIFIED LOCATION: ICD-10-CM

## 2020-09-25 DIAGNOSIS — Z12.31 ENCOUNTER FOR SCREENING MAMMOGRAM FOR MALIGNANT NEOPLASM OF BREAST: ICD-10-CM

## 2020-09-25 DIAGNOSIS — Z01.419 ENCOUNTER FOR GYNECOLOGICAL EXAMINATION WITH PAPANICOLAOU SMEAR OF CERVIX: Primary | ICD-10-CM

## 2020-09-25 PROCEDURE — 99396 PREV VISIT EST AGE 40-64: CPT | Performed by: OBSTETRICS & GYNECOLOGY

## 2020-09-25 NOTE — PROGRESS NOTES
ASSESSMENT & PLAN: Poli Burden is a 39 y o   with normal gynecologic exam     1   Routine well woman exam done today  2  Pap and HPV:  The patient's last pap and hpv was   It was normal     Pap and cotesting was done today  Current AS[facility]P Guidelines reviewed  - patient request yearly pap   3  Mammogram ordered  4  The following were reviewed in today's visit: breast self exam, mammography screening ordered, use and side effects of OCPs, exercise and healthy diet  5  Known small fibroid - repeat US sent     CC:  Annual Gynecologic Examination    HPI: Poli Burden is a 39 y o  [place] who presents for annual gynecologic examination  She has the following concerns: Had episode of brown spotting from end of august until about one week ago     Health Maintenance:    She wears her seatbelt routinely  She does perform regular monthly self breast exams  She feels safe at home  There is no problem list on file for this patient        Past Medical History:   Diagnosis Date    GERD (gastroesophageal reflux disease)     Mal de debarquement     Migraine        Past Surgical History:   Procedure Laterality Date    CERVIX SURGERY      FL INJECTION LEFT HIP (ARTHROGRAM)  2019       Past OB/Gyn History:  OB History        0    Para   0    Term   0       0    AB   0    Living   0       SAB   0    TAB   0    Ectopic   0    Multiple   0    Live Births   0                      Family History   Problem Relation Age of Onset    Hyperparathyroidism Mother     Heart disease Father     Diabetes Father     Stroke Maternal Grandmother     Lung cancer Maternal Grandfather     Heart disease Maternal Aunt     No Known Problems Paternal Aunt     No Known Problems Paternal Aunt     No Known Problems Paternal Aunt     Leukemia Paternal Uncle     Leukemia Paternal Grandfather     Heart disease Paternal Grandmother        Social History:  Social History     Socioeconomic History    Marital status: /Civil Union     Spouse name: Not on file    Number of children: Not on file    Years of education: Not on file    Highest education level: Not on file   Occupational History    Not on file   Social Needs    Financial resource strain: Not on file    Food insecurity     Worry: Not on file     Inability: Not on file    Transportation needs     Medical: Not on file     Non-medical: Not on file   Tobacco Use    Smoking status: Former Smoker    Smokeless tobacco: Never Used   Substance and Sexual Activity    Alcohol use: Yes     Comment: socially    Drug use: No    Sexual activity: Not Currently     Partners: Male     Birth control/protection: Pill   Lifestyle    Physical activity     Days per week: Not on file     Minutes per session: Not on file    Stress: Not on file   Relationships    Social connections     Talks on phone: Not on file     Gets together: Not on file     Attends Congregational service: Not on file     Active member of club or organization: Not on file     Attends meetings of clubs or organizations: Not on file     Relationship status: Not on file    Intimate partner violence     Fear of current or ex partner: Not on file     Emotionally abused: Not on file     Physically abused: Not on file     Forced sexual activity: Not on file   Other Topics Concern    Not on file   Social History Narrative    Not on file       Allergies   Allergen Reactions    Amoxicillin-Pot Clavulanate Hives    Codeine Hives and Rash    Klonopin [Clonazepam] Anxiety     Tolerates Valium    Latex Anaphylaxis     Other reaction(s): respiratory symptoms    Gadobenate Rash    Gadobutrol Rash    Pheniramine Rash     In MultiHist D    Phenylpropanolamine      In MultiHist D    Phenyltoloxamine      in MultiHist D    Pyrilamine      In MultiHist D       Current Outpatient Medications:     diazepam (VALIUM) 2 mg tablet, Take one tab by mouth nightly for 2 weeks, then use every 6 hours as needed for vertigo, Disp: , Rfl:     JUNEL 1/20 1-20 MG-MCG per tablet, TAKE CONTINUOUSLY, DO NOT  TAKE PLACEBOS, Disp: 84 tablet, Rfl: 3    LORazepam (ATIVAN) 0 5 mg tablet, Take by mouth, Disp: , Rfl:     meclizine (ANTIVERT) 25 mg tablet, , Disp: , Rfl:     ondansetron (ZOFRAN ODT) 4 mg disintegrating tablet, , Disp: , Rfl:     ranitidine (ZANTAC) 75 MG tablet, Take 75 mg by mouth 2 (two) times a day, Disp: , Rfl:     valACYclovir (VALTREX) 1,000 mg tablet, , Disp: , Rfl:     ZOLMitriptan (ZOMIG-ZMT) 5 MG disintegrating tablet, , Disp: , Rfl:     Biotin 10 MG CAPS, Take by mouth, Disp: , Rfl:     norethindrone-ethinyl estradiol (JUNEL 1/20) 1-20 MG-MCG per tablet, Take by mouth, Disp: , Rfl:     psyllium (METAMUCIL) 58 6 % powder, Take 1 packet by mouth 3 (three) times a day, Disp: , Rfl:     ZOLMitriptan (ZOMIG ZMT) 2 5 MG disintegrating tablet, , Disp: , Rfl:     Review of Systems:    Review of Systems  Constitutional :no fever, feels well, no tiredness, no recent weight gain or loss  ENT: no ear ache, no loss of hearing, no nosebleeds or nasal discharge, no sore throat or hoarseness  Cardiovascular: no complaints of slow or fast heart beat, no chest pain, no palpitations, no leg claudication or lower extremity edema  Respiratory: no complaints of shortness of shortness of breath, no CARRERA  Breasts:no complaints of breast pain, breast lump, or nipple discharge  Gastrointestinal: no complaints of abdominal pain, constipation, nausea, vomiting, or diarrhea or bloody stools  Genitourinary :  as noted in HPI  Musculoskeletal: no complaints of arthralgia, no myalgia, no joint swelling or stiffness, no limb pain or swelling    Integumentary: no complaints of skin rash or lesion, itching or dry skin  Neurological: no complaints of headache, no confusion, no numbness or tingling, no dizziness or fainting    Objective      /80     General:   appears stated age, cooperative, alert normal mood and affect   Lungs: clear to auscultation bilaterally   Breasts: Unlabored breathing    Abdomen: soft, non-tender, without masses or organomegaly   Vulva: normal   Vagina: normal vagina, no discharge, exudate, lesion, or erythema   Urethra: normal   Cervix: Normal, no discharge  Nontender  Uterus: normal size, contour, position, consistency, mobility, non-tender   Adnexa: no mass, fullness, tenderness   Lymphatic palpation of lymph nodes in neck, axilla, groin and/or other locations: no lymphadenopathy or masses noted   Skin normal skin turgor and no rashes     Psychiatric orientation to person, place, and time: normal  mood and affect: normal

## 2020-10-02 ENCOUNTER — HOSPITAL ENCOUNTER (OUTPATIENT)
Dept: ULTRASOUND IMAGING | Facility: HOSPITAL | Age: 45
Discharge: HOME | End: 2020-10-02
Attending: OBSTETRICS & GYNECOLOGY
Payer: COMMERCIAL

## 2020-10-02 DIAGNOSIS — D25.9 UTERINE LEIOMYOMA, UNSPECIFIED LOCATION: ICD-10-CM

## 2020-10-02 PROCEDURE — 76830 TRANSVAGINAL US NON-OB: CPT

## 2020-10-02 PROCEDURE — 76856 US EXAM PELVIC COMPLETE: CPT

## 2020-11-13 ENCOUNTER — NURSE TRIAGE (OUTPATIENT)
Dept: OTHER | Facility: OTHER | Age: 45
End: 2020-11-13

## 2020-11-13 DIAGNOSIS — Z20.828 SARS-ASSOCIATED CORONAVIRUS EXPOSURE: ICD-10-CM

## 2020-11-13 DIAGNOSIS — Z20.828 SARS-ASSOCIATED CORONAVIRUS EXPOSURE: Primary | ICD-10-CM

## 2020-11-13 PROCEDURE — U0003 INFECTIOUS AGENT DETECTION BY NUCLEIC ACID (DNA OR RNA); SEVERE ACUTE RESPIRATORY SYNDROME CORONAVIRUS 2 (SARS-COV-2) (CORONAVIRUS DISEASE [COVID-19]), AMPLIFIED PROBE TECHNIQUE, MAKING USE OF HIGH THROUGHPUT TECHNOLOGIES AS DESCRIBED BY CMS-2020-01-R: HCPCS | Performed by: FAMILY MEDICINE

## 2020-11-14 ENCOUNTER — TELEPHONE (OUTPATIENT)
Dept: OTHER | Facility: OTHER | Age: 45
End: 2020-11-14

## 2020-11-14 LAB — SARS-COV-2 RNA SPEC QL NAA+PROBE: DETECTED

## 2020-12-10 ENCOUNTER — TRANSCRIBE ORDERS (OUTPATIENT)
Dept: ADMINISTRATIVE | Facility: HOSPITAL | Age: 45
End: 2020-12-10

## 2020-12-10 DIAGNOSIS — R06.03 ACUTE RESPIRATORY DISTRESS: Primary | ICD-10-CM

## 2020-12-19 ENCOUNTER — HOSPITAL ENCOUNTER (OUTPATIENT)
Dept: CT IMAGING | Facility: HOSPITAL | Age: 45
Discharge: HOME | End: 2020-12-19
Payer: COMMERCIAL

## 2020-12-19 DIAGNOSIS — R06.03 ACUTE RESPIRATORY DISTRESS: ICD-10-CM

## 2020-12-19 PROCEDURE — 71250 CT THORAX DX C-: CPT

## 2020-12-19 PROCEDURE — G1004 CDSM NDSC: HCPCS

## 2020-12-21 DIAGNOSIS — Z30.41 ENCOUNTER FOR SURVEILLANCE OF CONTRACEPTIVE PILLS: ICD-10-CM

## 2020-12-21 RX ORDER — NORETHINDRONE ACETATE AND ETHINYL ESTRADIOL 1; 20 MG/1; UG/1
TABLET ORAL
Qty: 84 TABLET | Refills: 3 | Status: SHIPPED | OUTPATIENT
Start: 2020-12-21 | End: 2021-09-11

## 2021-01-27 ENCOUNTER — OFFICE VISIT (OUTPATIENT)
Dept: CARDIOLOGY CLINIC | Facility: CLINIC | Age: 46
End: 2021-01-27
Payer: COMMERCIAL

## 2021-01-27 ENCOUNTER — CLINICAL SUPPORT (OUTPATIENT)
Dept: CARDIOLOGY CLINIC | Facility: CLINIC | Age: 46
End: 2021-01-27
Payer: COMMERCIAL

## 2021-01-27 VITALS
SYSTOLIC BLOOD PRESSURE: 126 MMHG | HEART RATE: 76 BPM | DIASTOLIC BLOOD PRESSURE: 76 MMHG | BODY MASS INDEX: 31.18 KG/M2 | WEIGHT: 176 LBS

## 2021-01-27 DIAGNOSIS — R00.2 PALPITATIONS: ICD-10-CM

## 2021-01-27 DIAGNOSIS — R06.02 SHORTNESS OF BREATH: Primary | ICD-10-CM

## 2021-01-27 DIAGNOSIS — R07.9 CHEST PAIN, UNSPECIFIED TYPE: ICD-10-CM

## 2021-01-27 DIAGNOSIS — R06.02 SHORTNESS OF BREATH: ICD-10-CM

## 2021-01-27 DIAGNOSIS — R07.89 OTHER CHEST PAIN: ICD-10-CM

## 2021-01-27 PROCEDURE — 99214 OFFICE O/P EST MOD 30 MIN: CPT | Performed by: INTERNAL MEDICINE

## 2021-01-27 PROCEDURE — 93000 ELECTROCARDIOGRAM COMPLETE: CPT | Performed by: INTERNAL MEDICINE

## 2021-01-27 PROCEDURE — 93242 EXT ECG>48HR<7D RECORDING: CPT | Performed by: INTERNAL MEDICINE

## 2021-01-27 NOTE — PROGRESS NOTES
Subjective:        Patient ID: Brayan Gastelum is a 39 y o  female  Chief Complaint:  Jacques Gan is here for cardiac evaluation, suffered from COVID infection in November, mainly had coughing, shortness of breath and diarrhea for 2-4 weeks associated with some atypical exertional chest pains at that time, the latter of which has improved but she has persistent intermittent dyspnea on exertion and ongoing rapid regular palpitations both at rest and after mild exertion at times  She has had in her life time at least 2 episodes of fairly sudden onset of natacha out of vision, cold clammy sweating feeling with the feeling of presyncope with subsequent tachy palpitations but when she was evaluated in the emergency room nothing was found  She has no history rheumatic fever or heart disease  There is a family history of coronary artery disease  EKG normal sinus rhythm  Cardiac auscultation unremarkable normal S1 and S2 with no rub or gallop nor murmur  CT of chest from a cardiac perspective unremarkable  The following portions of the patient's history were reviewed and updated as appropriate: allergies, current medications, past family history, past medical history, past social history, past surgical history and problem list   Review of Systems   Constitution: Negative for chills, diaphoresis, malaise/fatigue and weight gain  HENT: Negative for nosebleeds and stridor  Eyes: Negative for double vision, vision loss in left eye, vision loss in right eye and visual disturbance  Cardiovascular: Positive for chest pain, dyspnea on exertion and palpitations  Negative for claudication, cyanosis, irregular heartbeat, leg swelling, near-syncope, orthopnea, paroxysmal nocturnal dyspnea and syncope  Respiratory: Negative for cough, shortness of breath, snoring and wheezing  Endocrine: Negative for polydipsia, polyphagia and polyuria  Hematologic/Lymphatic: Negative for bleeding problem   Does not bruise/bleed easily  Skin: Negative for flushing and rash  Musculoskeletal: Negative for falls and myalgias  Gastrointestinal: Negative for abdominal pain, heartburn, hematemesis, hematochezia, melena and nausea  Genitourinary: Negative for hematuria  Neurological: Negative for brief paralysis, dizziness, focal weakness, headaches, light-headedness, loss of balance and vertigo  Psychiatric/Behavioral: Negative for altered mental status and substance abuse  Allergic/Immunologic: Negative for hives  Objective:      /76   Pulse 76   Wt 79 8 kg (176 lb)   BMI 31 18 kg/m²   Physical Exam   Constitutional: She is oriented to person, place, and time  She appears well-developed and well-nourished  HENT:   Head: Normocephalic and atraumatic  Eyes: Pupils are equal, round, and reactive to light  EOM are normal    Neck: Normal range of motion  Neck supple  No JVD present  No thyromegaly present  Cardiovascular: Normal rate, regular rhythm, normal heart sounds and intact distal pulses  Exam reveals no gallop and no friction rub  No murmur heard  Pulmonary/Chest: Effort normal and breath sounds normal  No stridor  No respiratory distress  She has no wheezes  She has no rales  Abdominal: Soft  Bowel sounds are normal  She exhibits no mass  There is no abdominal tenderness  Musculoskeletal: Normal range of motion  General: No edema  Lymphadenopathy:     She has no cervical adenopathy  Neurological: She is alert and oriented to person, place, and time  Skin: Skin is warm and dry  No rash noted  No pallor  Psychiatric: She has a normal mood and affect  Her behavior is normal  Judgment and thought content normal    Nursing note and vitals reviewed  Lab Review:   No visits with results within 2 Month(s) from this visit  Latest known visit with results is:   Orders Only on 11/13/2020   Component Date Value    SARS-CoV-2  11/13/2020 Detected*     No results found  Assessment:       1  Shortness of breath  POCT ECG    Echo complete with contrast if indicated    AMB extended holter monitor    Stress test only, exercise   2  Palpitations  Echo complete with contrast if indicated    AMB extended holter monitor    Stress test only, exercise   3  Chest pain, unspecified type  Echo complete with contrast if indicated    AMB extended holter monitor    Stress test only, exercise        Plan:       In the wake of COVID with above complaints further testing is warranted  Echocardiogram, 1 week Zio patch and regular treadmill exercise stress test ordered  As long as this testing is unremarkable then no further acute cardiac testing is warranted and I told her many of her symptoms may be stress mediated and/or from deconditioning causing intermittent sinus tachycardia and dyspnea  She use to exercise regularly but ever since she was 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 2019 her activity level has markedly plummeted and she has gained weight possibly leading to the above complaints  I am not convinced she has any COVID related cardiac disease at this time  Though not why she is here at this time, history taking did suggest the possibility of infrequent PSVT, there is a family history of this, so I did tell her that should she get recurrent tachy palpitations that are either sustained or frequent or associated with presyncope or syncope she should then consider repeat cardiac and probable EPS evaluation for consideration of an EP study  I do not strongly recommend this at this time  She understands    She can follow-up here p r n , though we will call her for follow-up should any of the aforementioned testing returned abnormal

## 2021-01-27 NOTE — PATIENT INSTRUCTIONS
Chest Pain   AMBULATORY CARE:   Chest pain  can be caused by a range of conditions, from not serious to life-threatening  It may be caused by a heart attack or a blood clot in your lungs  Sometimes chest pain or pressure is caused by poor blood flow to your heart (angina)  Infection, inflammation, or a fracture in the bones or cartilage in your chest can cause pain or discomfort  Chest pain can also be a symptom of a digestive problem, such as acid reflux or a stomach ulcer  An anxiety attack or a strong emotion such as anger can also cause chest pain  It is important to follow up with your healthcare provider to find the cause of your chest pain  Common symptoms you may have with chest pain:   · Fever or sweating     · Nausea or vomiting     · Shortness of breath     · Discomfort or pressure that spreads from your chest to your back, jaw, or arm     · A racing or slow heartbeat     · Feeling weak, tired, or faint    Call 911 if:   · You have any of the following signs of a heart attack:      ? Squeezing, pressure, or pain in your chest    ? You may  also have any of the following:     § Discomfort or pain in your back, neck, jaw, stomach, or arm    § Shortness of breath    § Nausea or vomiting    § Lightheadedness or a sudden cold sweat      Seek care immediately if:   · You have chest discomfort that gets worse, even with medicine  · You cough or vomit blood  · Your bowel movements are black or bloody  · You cannot stop vomiting, or it hurts to swallow  Contact your healthcare provider if:   · You have questions or concerns about your condition or care  Treatment for chest pain  may include medicine to treat your symptoms while your healthcare provider finds the cause of your chest pain  · Medicines  may be given to treat the cause of your chest pain  Examples include pain medicine, anxiety medicine, or medicines to increase blood flow to your heart       · Do not take certain medicines without asking your healthcare provider first   These include NSAIDs, herbal or vitamin supplements, or hormones (estrogen or progestin)  · One or more stents  may need to be placed in your heart if pain was caused by blockage  A stent is a wire mesh tube that helps hold your artery open  Follow up with your healthcare provider within 72 hours, or as directed: You may need to return for more tests to find the cause of your chest pain  You may be referred to a specialist, such as a cardiologist or gastroenterologist  Write down your questions so you remember to ask them during your visits  Healthy living tips: The following are general healthy guidelines  If your chest pain is caused by a heart problem, your healthcare provider will give you specific guidelines to follow  · Do not smoke  Nicotine and other chemicals in cigarettes and cigars can cause lung and heart damage  Ask your healthcare provider for information if you currently smoke and need help to quit  E-cigarettes or smokeless tobacco still contain nicotine  Talk to your healthcare provider before you use these products  · Eat a variety of healthy, low-fat, low-salt foods  Healthy foods include fruits, vegetables, whole-grain breads, low-fat dairy products, beans, lean meats, and fish  Ask for more information about a heart healthy diet  · Drink plenty of water every day  Your body is made of mostly water  Water helps your body to control your temperature and blood pressure  Ask your healthcare provider how much water you should drink every day  · Ask about activity  Your healthcare provider will tell you which activities to limit or avoid  Ask when you can drive, return to work, and have sex  Ask about the best exercise plan for you  · Maintain a healthy weight  Ask your healthcare provider how much you should weigh  Ask him or her to help you create a weight loss plan if you are overweight  · Get the flu and pneumonia vaccines  All adults should get the influenza (flu) vaccine  Get it every year as soon as it becomes available  The pneumococcal vaccine is given to adults aged 72 years or older  The vaccine is given every 5 years to prevent pneumococcal disease, such as pneumonia  If you have a stent:   · Carry your stent card with you at all times  · Let all healthcare providers know that you have a stent  © Copyright [address] Information is for End User's use only and may not be sold, redistributed or otherwise used for commercial purposes  All illustrations and images included in CareNotes® are the copyrighted property of [employer]  or [address] [place] [address]  The above information is an  only  It is not intended as medical advice for individual conditions or treatments  Talk to your doctor, nurse or pharmacist before following any medical regimen to see if it is safe and effective for you

## 2021-02-09 DIAGNOSIS — Z23 ENCOUNTER FOR IMMUNIZATION: ICD-10-CM

## 2021-02-12 ENCOUNTER — HOSPITAL ENCOUNTER (OUTPATIENT)
Dept: NON INVASIVE DIAGNOSTICS | Facility: CLINIC | Age: 46
Discharge: HOME | End: 2021-02-12
Payer: COMMERCIAL

## 2021-02-12 ENCOUNTER — IMMUNIZATIONS (OUTPATIENT)
Dept: FAMILY MEDICINE CLINIC | Facility: HOSPITAL | Age: 46
End: 2021-02-12
Payer: COMMERCIAL

## 2021-02-12 DIAGNOSIS — R07.9 CHEST PAIN, UNSPECIFIED TYPE: ICD-10-CM

## 2021-02-12 DIAGNOSIS — R00.2 PALPITATIONS: ICD-10-CM

## 2021-02-12 DIAGNOSIS — R06.02 SHORTNESS OF BREATH: ICD-10-CM

## 2021-02-12 DIAGNOSIS — Z23 ENCOUNTER FOR IMMUNIZATION: Primary | ICD-10-CM

## 2021-02-12 PROCEDURE — 93306 TTE W/DOPPLER COMPLETE: CPT

## 2021-02-12 PROCEDURE — 93306 TTE W/DOPPLER COMPLETE: CPT | Performed by: INTERNAL MEDICINE

## 2021-02-19 ENCOUNTER — CLINICAL SUPPORT (OUTPATIENT)
Dept: CARDIOLOGY CLINIC | Facility: CLINIC | Age: 46
End: 2021-02-19
Payer: COMMERCIAL

## 2021-02-19 DIAGNOSIS — R00.2 PALPITATIONS: ICD-10-CM

## 2021-02-19 DIAGNOSIS — R07.9 CHEST PAIN, UNSPECIFIED TYPE: ICD-10-CM

## 2021-02-19 DIAGNOSIS — R06.02 SHORTNESS OF BREATH: ICD-10-CM

## 2021-02-19 PROCEDURE — 93244 EXT ECG>48HR<7D REV&INTERPJ: CPT | Performed by: INTERNAL MEDICINE

## 2021-02-24 NOTE — RESULT ENCOUNTER NOTE
Patient had a min HR of 48 bpm, max HR of 164 bpm, and avg HR of 71  bpm    Predominant underlying rhythm was Sinus Rhythm  Two Supraventricular Tachycardia runs occurred, the run with the fastest  interval lasting 5 beats with a max rate of 164 bpm, the longest lasting 9  beats with an avg rate of 130 bpm  Isolated SVEs were occasional (2 7%,  40873), SVE Couplets were rare (<1 0%, 14), and no SVE Triplets were  present  No Afib nor sustained nor reentrant SVT seen  No Isolated VEs, VE Couplets, or VE Triplets were present  No VT

## 2021-02-25 NOTE — RESULT ENCOUNTER NOTE
I dont advise any med changes  Did find two very brief runs of SVT which are benign and not likely the culprit nor anything to worry about  If her palpitations become more sustained or bothersome only then would I advise treatment  Lets see what the reji shows as well

## 2021-02-26 ENCOUNTER — HOSPITAL ENCOUNTER (OUTPATIENT)
Dept: NON INVASIVE DIAGNOSTICS | Facility: CLINIC | Age: 46
Discharge: HOME | End: 2021-02-26
Payer: COMMERCIAL

## 2021-02-26 DIAGNOSIS — R00.2 PALPITATIONS: ICD-10-CM

## 2021-02-26 DIAGNOSIS — R07.9 CHEST PAIN, UNSPECIFIED TYPE: ICD-10-CM

## 2021-02-26 DIAGNOSIS — R06.02 SHORTNESS OF BREATH: ICD-10-CM

## 2021-02-26 PROCEDURE — 93016 CV STRESS TEST SUPVJ ONLY: CPT | Performed by: INTERNAL MEDICINE

## 2021-02-26 PROCEDURE — 93018 CV STRESS TEST I&R ONLY: CPT | Performed by: INTERNAL MEDICINE

## 2021-02-26 PROCEDURE — 93017 CV STRESS TEST TRACING ONLY: CPT

## 2021-03-11 LAB
CHEST PAIN STATEMENT: NORMAL
MAX DIASTOLIC BP: 96 MMHG
MAX HEART RATE: 176 BPM
MAX PREDICTED HEART RATE: 175 BPM
MAX. SYSTOLIC BP: 178 MMHG
PROTOCOL NAME: NORMAL
REASON FOR TERMINATION: NORMAL
TARGET HR FORMULA: NORMAL
TEST INDICATION: NORMAL
TIME IN EXERCISE PHASE: NORMAL

## 2021-03-12 ENCOUNTER — IMMUNIZATIONS (OUTPATIENT)
Dept: FAMILY MEDICINE CLINIC | Facility: HOSPITAL | Age: 46
End: 2021-03-12

## 2021-03-12 DIAGNOSIS — Z23 ENCOUNTER FOR IMMUNIZATION: Primary | ICD-10-CM

## 2021-03-12 PROCEDURE — 0012A SARS-COV-2 / COVID-19 MRNA VACCINE (MODERNA) 100 MCG: CPT

## 2021-03-12 PROCEDURE — 91301 SARS-COV-2 / COVID-19 MRNA VACCINE (MODERNA) 100 MCG: CPT

## 2021-04-26 ENCOUNTER — OFFICE VISIT (OUTPATIENT)
Dept: OBGYN CLINIC | Facility: CLINIC | Age: 46
End: 2021-04-26
Payer: COMMERCIAL

## 2021-04-26 VITALS
DIASTOLIC BLOOD PRESSURE: 82 MMHG | WEIGHT: 177.8 LBS | SYSTOLIC BLOOD PRESSURE: 132 MMHG | HEIGHT: 63 IN | BODY MASS INDEX: 31.5 KG/M2

## 2021-04-26 DIAGNOSIS — B37.89 CANDIDIASIS OF BREAST: Primary | ICD-10-CM

## 2021-04-26 DIAGNOSIS — N64.4 NIPPLE PAIN: ICD-10-CM

## 2021-04-26 DIAGNOSIS — R39.9 UTI SYMPTOMS: ICD-10-CM

## 2021-04-26 LAB
BILIRUB UR QL STRIP: NEGATIVE
CLARITY UR: CLEAR
COLOR UR: YELLOW
GLUCOSE UR STRIP-MCNC: NEGATIVE MG/DL
HGB UR QL STRIP.AUTO: NEGATIVE
KETONES UR STRIP-MCNC: NEGATIVE MG/DL
LEUKOCYTE ESTERASE UR QL STRIP: NEGATIVE
NITRITE UR QL STRIP: NEGATIVE
PH UR STRIP.AUTO: 7 [PH]
PROT UR STRIP-MCNC: NEGATIVE MG/DL
SP GR UR STRIP.AUTO: 1 (ref 1–1.03)
UROBILINOGEN UR QL STRIP.AUTO: 0.2 E.U./DL

## 2021-04-26 PROCEDURE — 81003 URINALYSIS AUTO W/O SCOPE: CPT | Performed by: ADVANCED PRACTICE MIDWIFE

## 2021-04-26 PROCEDURE — 3008F BODY MASS INDEX DOCD: CPT | Performed by: ADVANCED PRACTICE MIDWIFE

## 2021-04-26 PROCEDURE — 1036F TOBACCO NON-USER: CPT | Performed by: ADVANCED PRACTICE MIDWIFE

## 2021-04-26 PROCEDURE — 99214 OFFICE O/P EST MOD 30 MIN: CPT | Performed by: ADVANCED PRACTICE MIDWIFE

## 2021-04-26 PROCEDURE — 87086 URINE CULTURE/COLONY COUNT: CPT | Performed by: ADVANCED PRACTICE MIDWIFE

## 2021-04-26 RX ORDER — OMEPRAZOLE 20 MG/1
20 CAPSULE, DELAYED RELEASE ORAL DAILY
COMMUNITY
Start: 2021-02-18 | End: 2022-02-18

## 2021-04-26 RX ORDER — ZOLMITRIPTAN 5 MG/1
SPRAY NASAL AS NEEDED
COMMUNITY

## 2021-04-26 RX ORDER — FLUCONAZOLE 150 MG/1
150 TABLET ORAL ONCE
Qty: 1 TABLET | Refills: 0 | Status: SHIPPED | OUTPATIENT
Start: 2021-04-26 | End: 2021-04-26

## 2021-04-26 NOTE — PROGRESS NOTES
Assessment:   Juan Pablo Chen was seen today for breast pain  Diagnoses and all orders for this visit:    Candidiasis of breast  -     fluconazole (DIFLUCAN) 150 mg tablet; Take 1 tablet (150 mg total) by mouth once for 1 dose    Nipple pain  -     Mammo diagnostic left w 3d & cad; Future    UTI symptoms  -     Urine culture  -     UA (URINE) with reflex to Scope        - will await results to determine if needs additional treatment  Plan:  - Will follow-up mammogram  - to call if change or worsening symptoms  - will consider antibiotic if no improvement  - RTO next scheduled visit  Subjective:    Patient is a 39 y o  y o  Female who presents for a problem visit  Pt is c/o + breast pain and - breast lump  Sx's are in the left side  Sx's started 4 days ago  Patient reports that sx's are not related to her menses  Pt reports - changes to her diet  She drinks 1 caffeinated products daily  Pt reports - breast trauma  Pt reports - changes in diet and activity  Pt reports - nipple discharge  Notes that sensitivity started 3 days ago and this a m  increased nipple pain with movement of breast and states that when she lifted breast nipple inverted, which has not happened in the past  Feels like that is a throbbing sensation in the breast  She was on Macrobid for a UTI, but no other noted changes  Had to stop Macrobid early and still having milder UTI symptoms  There is no problem list on file for this patient        Past Medical History:   Diagnosis Date    Breast pain     GERD (gastroesophageal reflux disease)     Mal de debarquement     Migraine        Past Surgical History:   Procedure Laterality Date    CERVIX SURGERY      EAR SURGERY Bilateral     FL INJECTION LEFT HIP (ARTHROGRAM)  6/25/2019       Family History   Problem Relation Age of Onset    Hyperparathyroidism Mother     Heart disease Father     Diabetes Father     Stroke Maternal Grandmother     Lung cancer Maternal Grandfather  Heart disease Maternal Aunt     No Known Problems Paternal Aunt     No Known Problems Paternal Aunt     No Known Problems Paternal Aunt     Leukemia Paternal Uncle     Leukemia Paternal Grandfather     Heart disease Paternal Grandmother        Social History     Socioeconomic History    Marital status: /Civil Union     Spouse name: Not on file    Number of children: Not on file    Years of education: Not on file    Highest education level: Not on file   Occupational History    Not on file   Social Needs    Financial resource strain: Not on file    Food insecurity     Worry: Not on file     Inability: Not on file   [employer] needs     Medical: Not on file     Non-medical: Not on file   Tobacco Use    Smoking status: Former Smoker    Smokeless tobacco: Never Used   Substance and Sexual Activity    Alcohol use: Yes     Comment: socially    Drug use: No    Sexual activity: Not Currently     Partners: Male     Birth control/protection: Pill   Lifestyle    Physical activity     Days per week: Not on file     Minutes per session: Not on file    Stress: Not on file   Relationships    Social connections     Talks on phone: Not on file     Gets together: Not on file     Attends [employer] service: Not on file     Active member of club or organization: Not on file     Attends meetings of clubs or organizations: Not on file     Relationship status: Not on file    Intimate partner violence     Fear of current or ex partner: Not on file     Emotionally abused: Not on file     Physically abused: Not on file     Forced sexual activity: Not on file   Other Topics Concern    Not on file   Social History Narrative    Not on file         Current Outpatient Medications:     Biotin 10 MG CAPS, Take by mouth, Disp: , Rfl:     diazepam (VALIUM) 2 mg tablet, Take one tab by mouth nightly for 2 weeks, then use every 6 hours as needed for vertigo, Disp: , Rfl:     Junel 1/20 1-20 MG-MCG per tablet, TAKE CONTINUOUSLY, Disp: 84 tablet, Rfl: 3    LORazepam (ATIVAN) 0 5 mg tablet, Take by mouth, Disp: , Rfl:     meclizine (ANTIVERT) 25 mg tablet, , Disp: , Rfl:     omeprazole (PriLOSEC) 20 mg delayed release capsule, Take 20 mg by mouth daily, Disp: , Rfl:     ondansetron (ZOFRAN ODT) 4 mg disintegrating tablet, , Disp: , Rfl:     valACYclovir (VALTREX) 1,000 mg tablet, , Disp: , Rfl:     ZOLMitriptan (ZOMIG) 5 MG nasal solution, into each nostril as needed for migraine, Disp: , Rfl:     fluconazole (DIFLUCAN) 150 mg tablet, Take 1 tablet (150 mg total) by mouth once for 1 dose, Disp: 1 tablet, Rfl: 0    norethindrone-ethinyl estradiol (JUNEL 1/20) 1-20 MG-MCG per tablet, Take by mouth, Disp: , Rfl:     psyllium (METAMUCIL) 58 6 % powder, Take 1 packet by mouth 3 (three) times a day, Disp: , Rfl:     ranitidine (ZANTAC) 75 MG tablet, Take 75 mg by mouth 2 (two) times a day, Disp: , Rfl:     ZOLMitriptan (ZOMIG ZMT) 2 5 MG disintegrating tablet, , Disp: , Rfl:     ZOLMitriptan (ZOMIG-ZMT) 5 MG disintegrating tablet, , Disp: , Rfl:     Allergies   Allergen Reactions    Amoxicillin-Pot Clavulanate Hives    Codeine Hives and Rash    Klonopin [Clonazepam] Anxiety     Tolerates Valium    Latex Anaphylaxis     Other reaction(s): respiratory symptoms    Gadobenate Rash    Gadobutrol Rash    Pheniramine Rash     In MultiHist D    Phenylpropanolamine      In MultiHist D    Phenyltoloxamine      in MultiHist D    Pyrilamine      In MultiHist D       Review of Systems  Constitutional :no fever, feels well, no tiredness, no recent weight gain or loss  ENT: no ear ache, no loss of hearing, no nosebleeds or nasal discharge, no sore throat or hoarseness  Cardiovascular: no complaints of slow or fast heart beat, no chest pain, no palpitations, no leg claudication or lower extremity edema    Respiratory: no complaints of shortness of shortness of breath, no CARRERA  Breasts:no complaints of breast pain, breast lump, or nipple discharge  Gastrointestinal: no complaints of abdominal pain, constipation, nausea, vomiting, or diarrhea or bloody stools  Genitourinary : no complaints of dysuria, incontinence, pelvic pain, no dysmenorrhea, vaginal discharge or abnormal vaginal bleeding and as noted in HPI  Musculoskeletal: no complaints of arthralgia, no myalgia, no joint swelling or      stiffness, no limb pain or swelling  Integumentary: no complaints of skin rash or lesion, itching or dry skin  Neurological: no complaints of headache, no confusion, no numbness or tingling, no dizziness or fainting    Constitutional   General appearance: No acute distress, well appearing and well nourished  Psychiatric   Orientation to person, place, and time: Normal     Mood and affect: Normal     Breast  Breasts: right breast normal without mass, skin or nipple changes or axillary nodes, left breast normal without mass, skin or nipple changes or axillary nodes  Positive left breast tenderness with palpation at 7 o'clock that radiates to nipple, left nipple with redness at base and tenderness with palpation

## 2021-04-27 LAB — BACTERIA UR CULT: NORMAL

## 2021-04-28 ENCOUNTER — TRANSCRIBE ORDERS (OUTPATIENT)
Dept: ADMINISTRATIVE | Facility: HOSPITAL | Age: 46
End: 2021-04-28

## 2021-04-28 DIAGNOSIS — D45 POLYCYTHEMIA VERA (HCC): Primary | ICD-10-CM

## 2021-04-30 ENCOUNTER — HOSPITAL ENCOUNTER (OUTPATIENT)
Dept: ULTRASOUND IMAGING | Facility: CLINIC | Age: 46
Discharge: HOME | End: 2021-04-30
Payer: COMMERCIAL

## 2021-04-30 ENCOUNTER — HOSPITAL ENCOUNTER (OUTPATIENT)
Dept: MAMMOGRAPHY | Facility: CLINIC | Age: 46
Discharge: HOME | End: 2021-04-30
Payer: COMMERCIAL

## 2021-04-30 VITALS — WEIGHT: 177 LBS | HEIGHT: 63 IN | BODY MASS INDEX: 31.36 KG/M2

## 2021-04-30 DIAGNOSIS — N64.4 BREAST PAIN: ICD-10-CM

## 2021-04-30 DIAGNOSIS — N64.4 NIPPLE PAIN: ICD-10-CM

## 2021-04-30 PROCEDURE — 76642 ULTRASOUND BREAST LIMITED: CPT

## 2021-04-30 PROCEDURE — 77066 DX MAMMO INCL CAD BI: CPT

## 2021-04-30 PROCEDURE — G0279 TOMOSYNTHESIS, MAMMO: HCPCS

## 2021-06-11 ENCOUNTER — HOSPITAL ENCOUNTER (OUTPATIENT)
Dept: CT IMAGING | Facility: HOSPITAL | Age: 46
Discharge: HOME | End: 2021-06-11
Payer: COMMERCIAL

## 2021-06-11 ENCOUNTER — APPOINTMENT (OUTPATIENT)
Dept: MAMMOGRAPHY | Facility: HOSPITAL | Age: 46
End: 2021-06-11
Attending: OBSTETRICS & GYNECOLOGY
Payer: COMMERCIAL

## 2021-06-11 DIAGNOSIS — D45 POLYCYTHEMIA VERA (HCC): ICD-10-CM

## 2021-06-11 PROCEDURE — G1004 CDSM NDSC: HCPCS

## 2021-06-11 PROCEDURE — 71250 CT THORAX DX C-: CPT

## 2021-07-26 DIAGNOSIS — L73.9 FOLLICULITIS: Primary | ICD-10-CM

## 2021-07-27 RX ORDER — CLINDAMYCIN PHOSPHATE 11.9 MG/ML
SOLUTION TOPICAL 2 TIMES DAILY
Qty: 60 ML | Refills: 3 | Status: SHIPPED | OUTPATIENT
Start: 2021-07-27

## 2021-09-11 DIAGNOSIS — Z30.41 ENCOUNTER FOR SURVEILLANCE OF CONTRACEPTIVE PILLS: ICD-10-CM

## 2021-09-11 RX ORDER — NORETHINDRONE ACETATE AND ETHINYL ESTRADIOL 1; 20 MG/1; UG/1
TABLET ORAL
Qty: 84 TABLET | Refills: 3 | Status: SHIPPED | OUTPATIENT
Start: 2021-09-11

## 2021-09-22 ENCOUNTER — EVALUATION (OUTPATIENT)
Dept: PHYSICAL THERAPY | Facility: CLINIC | Age: 46
End: 2021-09-22
Payer: COMMERCIAL

## 2021-09-22 DIAGNOSIS — R42 MAL DE DEBARQUEMENT: ICD-10-CM

## 2021-09-22 DIAGNOSIS — R53.83 DECREASED STAMINA: ICD-10-CM

## 2021-09-22 DIAGNOSIS — R42 DIZZINESS AND GIDDINESS: Primary | ICD-10-CM

## 2021-09-22 PROCEDURE — 97163 PT EVAL HIGH COMPLEX 45 MIN: CPT | Performed by: PHYSICAL THERAPIST

## 2021-09-22 PROCEDURE — 97112 NEUROMUSCULAR REEDUCATION: CPT | Performed by: PHYSICAL THERAPIST

## 2021-09-22 NOTE — LETTER
2021    [address]  [address]  [address]    Patient: Juliet Renteria   YOB: 1975   Date of Visit: 2021     Encounter Diagnosis     ICD-10-CM    1  Dizziness and giddiness  R42    2  Mal de debarquement  R42    3  Decreased stamina  R53 83        Dear Dr Jerome Damico: Thank you for your recent referral of Juliet Renteria  Please review the attached evaluation summary from Di's recent visit  Please verify that you agree with the plan of care by signing the attached order  If you have any questions or concerns, please do not hesitate to call  I sincerely appreciate the opportunity to share in the care of one of your patients and hope to have another opportunity to work with you in the near future  Sincerely,    Olya Perry      Referring Provider:      I certify that I have read the below Plan of Care and certify the need for these services furnished under this plan of treatment while under my care  JILLIAN QuezadaCase[facility] Stores[facility] [address]  [address] [place] [id number]  Via Fax: [phone]          PT Evaluation     Today's date: 2021  Patient name: Juliet Renteria  : 1975  MRN: [id number]  Referring provider: Renate Walker PA-C  Dx:   Encounter Diagnosis     ICD-10-CM    1  Dizziness and giddiness  R42    2  Mal de debarquement  R42    3  Decreased stamina  R53 83                   Assessment  Assessment details: Patient is a 55y o  year old female presenting to OPPT s/p diagnosis of vertigo, dizziness, mal de embarquement  Patient demonstrates decreased strength, endurance, balance, and functional independence  Patient is unable to return to work at this time and requires assistance for some household responsibilities  Verbalizing significant visual sensitivity to include movement of her own UEs    Her eventual hope is to return to work unrestricted  She has presently been OOW 2/2 concerns for patient and personal safety due to decreased ability to maintain balance and perform highly challenging divided attention tasks  She will benefit from skilled PT interventions to maximize return to PLOF and independence as able  Thank you for this referral and the ability to participate in the care of this patient  Impairments: abnormal coordination, abnormal gait, abnormal movement, activity intolerance, impaired balance, impaired physical strength, lacks appropriate home exercise program, safety issue and weight-bearing intolerance  Understanding of Dx/Px/POC: good   Prognosis: good  Prognosis details: Positive prognostic indicators include positive attitude toward recovery and good understanding of diagnosis and treatment plan options  Negative prognostic indicators include chronicity of symptoms and co-morbidities  Goals  In 4 weeks, patient will:  1  Demonstrate ability to perform 30 minutes of activity without requiring seated rest break  2   Demonstrate ability to maintain upright balance unsupported by UEs while reaching above shoulder height without resistance to promote stability with ADLs  3  Demonstrate ability to lift up to 15 lbs from  knees to waist unsupported by UEs to promote stability with ADLs  4   Demonstrate increased strength of bilateral LEs to allow for improved transfer quality and stability  5  Demonstrate decreased visual motion sensitivity by 4/10 points  In 4-10 weeks, patient will:  1  Demonstrate reduced fall risk based on outcome measures  2  Demonstrate consistent carryover with HEP  3    Return to community ambulation with least restrictive AD for at least 1000+ feet      Plan  Patient would benefit from: skilled physical therapy  Other planned modality interventions: Modalities prn for symptom management  Planned therapy interventions: manual therapy, neuromuscular re-education, therapeutic activities, therapeutic exercise, gait training and home exercise program  Frequency: 2x week  Duration in visits: 8  Duration in weeks: 4  Plan of Care beginning date: 9/22/2021  Plan of Care expiration date: 10/22/2021  Treatment plan discussed with: patient and PTA        Subjective Evaluation    History of Present Illness  Mechanism of injury: Per EMR:  Acute flair up of vertigo over past month  No triggers  She feels this is much worse then her previous mal de debarquement syndrome  Any activity triggers the dysequilibrium, imbalance and nausea  No headaches or improvement with Zomig  Unable to work since last week  Evaluation with ENT ongoing  Had VNG last week and told "poor function of right inner ear" but waiting for recommendations of ENT  She does find Antivert and Ativan somewhat helpful  She feels the Ativan is helping her anxiety  Will await recommendations and ? diagnosis from ENT at this time given recent evaluation but discussed vestibular therapy and treating underlying anxiety with low dose SSRI rather then daily Ativan  Her PCP left and she is currently trying establish care  She will review with ENT and get back to me  Of note:  Chronic pressure issues in her ears  Had tubes placed in April, long course of steroids at that time  Notes this helped initially  Has a blocked tube on the left and pressure has returned  She notes that with left sided pressure, she has face swelling and drooping  Saw ENT x 2 and had courses of steroids, poor response to them        Relative timeline:  Felt okay in Oct 2020 after [place] fishing trip, standing in [facility] [address], water running past her  Nov 2020 Covid +   Suffered through covid up through approx Feb  CT-emphysema - followed with cards  IN April tubes placed, follow up CT scan of chest  Recent - sinus infection, no antibiotics  "crooked" tube in left ear  VNG performed  Quality of life: fair    Pain  No pain reported    Social Support    Employment status: working (As nurse)  Treatments  Previous treatment: medication  Current treatment: physical therapy  Patient Goals  Patient goals for therapy: improved balance, increased strength, independence with ADLs/IADLs, return to sport/leisure activities and return to work          Objective     Concurrent Complaints  Positive for nausea/motion sickness and poor concentration  Strength/Myotome Testing     Left Hip   Planes of Motion   Flexion: 4  Abduction: 4    Right Hip   Planes of Motion   Flexion: 4  Abduction: 4    Left Knee   Flexion: 4  Extension: 4    Right Knee   Flexion: 4  Extension: 4    Left Ankle/Foot   Dorsiflexion: 4+  Plantar flexion: 4+    Right Ankle/Foot   Dorsiflexion: 4+  Plantar flexion: 4+  Neuro Exam:     Dizziness  Positive for disequilibrium, motion sickness, rocking or swaying and floating or swimming  Exacerbating factors  Positive for walking, optokinetic movement and walking in busy environment  Cervical exam   Ligament Laxity Testing   Alar ligament: WNL  Sharp Vmial: WNL  Modified VBI   Left: asymptomatic  Right: asymptomatic    Oculomotor exam   Oculomotor ROM: WNL  Resting nystagmus: not present   Gaze holding nystagmus: not present left  and not present right  Smooth pursuits: saccadic smooth pursuit  Vertical saccades: Visual motion sensitivitiy  Horizontal saccades: Visual motion sensitivitiy  Convergence: abnormal  Cover test: normal  Crossover test: normal    Positional testing   Positional testing comment: Upcoming sessions    Sensation   Light touch LE: left WNL and right WNL  Proprioception LE: left WNL and right WNL  Sharp/fdull LE: left WNL and right WNL    Coordination   Rapid alternating movements: UE WNL and LE impaired     Functional outcomes   Functional outcome gait comment: Ambulates with increased LEONEL, intermittent corrective step, minimal head movement, decreased visual scanning of environment               Precautions: Falls, Mal de Embarquement   Re-eval Date: 10/21/2021    Date 9/22       Visit Count 1       FOTO See IE       Pain In See IE       Pain Out See IE           Manuals        cervical prn       crt prn                       Neuro Re-Ed        VOR Near  R/L  Up/down        VOR Far  R/L  Up/down        Saccades        Pencil push ups        Justin string        Eyes-head                Ther Ex        Aerobic Sx d ump prn       SLR x 3        HR/TR        Clamshells        Mini Squats                        HEP Pencil push ups  VOR Near  R/L and up/down  Tband dispensed       Ther Activity                        Gait Training                        Modalities

## 2021-09-22 NOTE — PROGRESS NOTES
PT Evaluation     Today's date: 2021  Patient name: Rafy Sanchez  : 1975  MRN: [id number]  Referring provider: Norma Wong PA-C  Dx:   Encounter Diagnosis     ICD-10-CM    1  Dizziness and giddiness  R42    2  Mal de debarquement  R42    3  Decreased stamina  R53 83                   Assessment  Assessment details: Patient is a 55y o  year old female presenting to OPPT s/p diagnosis of vertigo, dizziness, mal de embarquement  Patient demonstrates decreased strength, endurance, balance, and functional independence  Patient is unable to return to work at this time and requires assistance for some household responsibilities  Verbalizing significant visual sensitivity to include movement of her own UEs  Her eventual hope is to return to work unrestricted  She has presently been OOW 2/2 concerns for patient and personal safety due to decreased ability to maintain balance and perform highly challenging divided attention tasks  She will benefit from skilled PT interventions to maximize return to PLOF and independence as able  Thank you for this referral and the ability to participate in the care of this patient  Impairments: abnormal coordination, abnormal gait, abnormal movement, activity intolerance, impaired balance, impaired physical strength, lacks appropriate home exercise program, safety issue and weight-bearing intolerance  Understanding of Dx/Px/POC: good   Prognosis: good  Prognosis details: Positive prognostic indicators include positive attitude toward recovery and good understanding of diagnosis and treatment plan options  Negative prognostic indicators include chronicity of symptoms and co-morbidities  Goals  In 4 weeks, patient will:  1  Demonstrate ability to perform 30 minutes of activity without requiring seated rest break    2   Demonstrate ability to maintain upright balance unsupported by UEs while reaching above shoulder height without resistance to promote stability with ADLs  3  Demonstrate ability to lift up to 15 lbs from  knees to waist unsupported by UEs to promote stability with ADLs  4   Demonstrate increased strength of bilateral LEs to allow for improved transfer quality and stability  5  Demonstrate decreased visual motion sensitivity by 4/10 points  In 4-10 weeks, patient will:  1  Demonstrate reduced fall risk based on outcome measures  2  Demonstrate consistent carryover with HEP  3  Return to community ambulation with least restrictive AD for at least 1000+ feet      Plan  Patient would benefit from: skilled physical therapy  Other planned modality interventions: Modalities prn for symptom management  Planned therapy interventions: manual therapy, neuromuscular re-education, therapeutic activities, therapeutic exercise, gait training and home exercise program  Frequency: 2x week  Duration in visits: 8  Duration in weeks: 4  Plan of Care beginning date: 9/22/2021  Plan of Care expiration date: 10/22/2021  Treatment plan discussed with: patient and PTA        Subjective Evaluation    History of Present Illness  Mechanism of injury: Per EMR:  Acute flair up of vertigo over past month  No triggers  She feels this is much worse then her previous mal de debarquement syndrome  Any activity triggers the dysequilibrium, imbalance and nausea  No headaches or improvement with Zomig  Unable to work since last week  Evaluation with ENT ongoing  Had VNG last week and told "poor function of right inner ear" but waiting for recommendations of ENT  She does find Antivert and Ativan somewhat helpful  She feels the Ativan is helping her anxiety  Will await recommendations and ? diagnosis from ENT at this time given recent evaluation but discussed vestibular therapy and treating underlying anxiety with low dose SSRI rather then daily Ativan  Her PCP left and she is currently trying establish care  She will review with ENT and get back to me        Of note:  Chronic pressure issues in her ears  Had tubes placed in April, long course of steroids at that time  Notes this helped initially  Has a blocked tube on the left and pressure has returned  She notes that with left sided pressure, she has face swelling and drooping  Saw ENT x 2 and had courses of steroids, poor response to them  Relative timeline:  Felt okay in Oct 2020 after [address] fishing trip, standing in [facility] [address], water running past her  Nov 2020 Covid +   Suffered through covid up through approx Feb  CT-emphysema - followed with cards  IN April tubes placed, follow up CT scan of chest  Recent - sinus infection, no antibiotics  "crooked" tube in left ear  VNG performed  Quality of life: fair    Pain  No pain reported    Social Support    Employment status: working (As nurse)  Treatments  Previous treatment: medication  Current treatment: physical therapy  Patient Goals  Patient goals for therapy: improved balance, increased strength, independence with ADLs/IADLs, return to sport/leisure activities and return to work          Objective     Concurrent Complaints  Positive for nausea/motion sickness and poor concentration  Strength/Myotome Testing     Left Hip   Planes of Motion   Flexion: 4  Abduction: 4    Right Hip   Planes of Motion   Flexion: 4  Abduction: 4    Left Knee   Flexion: 4  Extension: 4    Right Knee   Flexion: 4  Extension: 4    Left Ankle/Foot   Dorsiflexion: 4+  Plantar flexion: 4+    Right Ankle/Foot   Dorsiflexion: 4+  Plantar flexion: 4+  Neuro Exam:     Dizziness  Positive for disequilibrium, motion sickness, rocking or swaying and floating or swimming  Exacerbating factors  Positive for walking, optokinetic movement and walking in busy environment  Cervical exam   Ligament Laxity Testing   Alar ligament: WNL  Sharp Vimal:  WNL  Modified VBI   Left: asymptomatic  Right: asymptomatic    Oculomotor exam   Oculomotor ROM: WNL  Resting nystagmus: not present   Gaze holding nystagmus: not present left  and not present right  Smooth pursuits: saccadic smooth pursuit  Vertical saccades: Visual motion sensitivitiy  Horizontal saccades: Visual motion sensitivitiy  Convergence: abnormal  Cover test: normal  Crossover test: normal    Positional testing   Positional testing comment: Upcoming sessions    Sensation   Light touch LE: left WNL and right WNL  Proprioception LE: left WNL and right WNL  Sharp/fdull LE: left WNL and right WNL    Coordination   Rapid alternating movements: UE WNL and LE impaired     Functional outcomes   Functional outcome gait comment: Ambulates with increased LEONEL, intermittent corrective step, minimal head movement, decreased visual scanning of environment               Precautions: Falls, Mal de Embarquement   Re-eval Date: 10/21/2021    Date 9/22       Visit Count 1       FOTO See IE       Pain In See IE       Pain Out See IE           Manuals        cervical prn       crt prn                       Neuro Re-Ed        VOR Near  R/L  Up/down        VOR Far  R/L  Up/down        Saccades        Pencil push ups        Justin string        Eyes-head                Ther Ex        Aerobic Sx d ump prn       SLR x 3        HR/TR        Clamshells        Mini Squats                        HEP Pencil push ups  VOR Near  R/L and up/down  Tband dispensed       Ther Activity                        Gait Training                        Modalities

## 2021-09-27 NOTE — PROGRESS NOTES
Daily Note     Today's date: 2021  Patient name: Sarah Beth Moscoso  : 1975  MRN: [id number]  Referring provider: Larae Eisenmenger, PA-C  Dx:   Encounter Diagnosis     ICD-10-CM    1  Dizziness and giddiness  R42    2  Mal de debarquement  R42    3  Decreased stamina  R53 83                   Subjective: I have noticed that chewing food/jaw movement seems to trigger sx's of feeling unsteady  I am occasionally even dizzy just laying in bed  Took vertigo meds yesterday, didn't seem to help  I have trouble with foggy brain / nausea  occasionally        Objective: See treatment diary below      Assessment: Tolerated treatment fair  Increase nausea, Lateral sway with standing TE   Noted facial twitching R/L  Denied room spin t/o rx session  Pt placed in seated position in quiet room for trial reduce sx'  Patient would benefit from continued PT      Plan: Continue per plan of care        Precautions: Falls, Mal de Embarquement   Re-eval Date: 10/21/2021    Date       Visit Count 1 2      FOTO See IE       Pain In See IE       Pain Out See IE           Manuals        cervical prn       crt prn                       Neuro Re-Ed        VOR Near  R/L  Up/down        VOR Far  R/L  Up/down        Saccades        Pencil push ups        Justin string        Eyes-head                Ther Ex        Aerobic Sx d ump prn       SLR x 3  //  Firm  1 HHA  2x10   W/ seated rest      HR/TR  Firm  2x10  2 HHA      Clamshells        Mini Squats                        HEP Pencil push ups  VOR Near  R/L and up/down  Tband dispensed       Ther Activity                        Gait Training                        Modalities

## 2021-09-28 ENCOUNTER — OFFICE VISIT (OUTPATIENT)
Dept: PHYSICAL THERAPY | Facility: CLINIC | Age: 46
End: 2021-09-28
Payer: COMMERCIAL

## 2021-09-28 DIAGNOSIS — R53.83 DECREASED STAMINA: ICD-10-CM

## 2021-09-28 DIAGNOSIS — R42 MAL DE DEBARQUEMENT: ICD-10-CM

## 2021-09-28 DIAGNOSIS — R42 DIZZINESS AND GIDDINESS: Primary | ICD-10-CM

## 2021-09-28 PROCEDURE — 97110 THERAPEUTIC EXERCISES: CPT

## 2021-09-29 NOTE — PROGRESS NOTES
Daily Note     Today's date: 2021  Patient name: Angelica Walker  : 1975  MRN: [id number]  Referring provider: Amanda Araiza PA-C  Dx:   Encounter Diagnosis     ICD-10-CM    1  Dizziness and giddiness  R42    2  Mal de debarquement  R42    3  Decreased stamina  R53 83                   Subjective: Increased symptoms after last session  Attempted LE TE program at home with same result  Also stepping up/down a step stool  Objective: See treatment diary below      Assessment: Tolerated treatment well  Patient demonstrated fatigue post treatment and would benefit from continued PT  Improved tolerance this session  Set pace with metronome this date  Progressing slowly with gaze stabilization, but overall improved tolerance this date  Plan: Continue per plan of care        Precautions: Falls, Mal de Embarquement   Re-eval Date: 10/21/2021    Date      Visit Count 1 2 3     FOTO See IE       Pain In See IE  0     Pain Out See IE  0         Manuals        cervical prn       crt prn                       Neuro Re-Ed        VOR Near  R/L  Up/down   Seated  Firm  60 sec x 2  R/L and up/down  Self select x 1  Metronome 70 bpm x 1     VOR Far  R/L  Up/down        Saccades   R/L  Up/down  "clock"  Clock-midline  Seek and find  Stand  Firm  Plain  Shoulder width x 2  Hip width x 2     Pencil push ups        Justin string        Eyes-head   R/L  Up/down  "clock"  Clock-midline  Seek and find  Stand  Firm  Plain  Shoulder width x 2  Hip width x 2     Imaginary Target   Seated   Firm  Plain    60 sec x 2     Ther Ex        Aerobic Sx d ump prn       SLR x 3  //  Firm  1 HHA  2x10   W/ seated rest      HR/TR  Firm  2x10  2 HHA      Clamshells        Mini Squats                        HEP Pencil push ups  VOR Near  R/L and up/down  Tband dispensed  Discussed medical management, conversations with physician, encouraged ambulation     Ther Activity                        Gait Training Modalities

## 2021-09-30 ENCOUNTER — OFFICE VISIT (OUTPATIENT)
Dept: PHYSICAL THERAPY | Facility: CLINIC | Age: 46
End: 2021-09-30
Payer: COMMERCIAL

## 2021-09-30 DIAGNOSIS — R42 MAL DE DEBARQUEMENT: ICD-10-CM

## 2021-09-30 DIAGNOSIS — R42 DIZZINESS AND GIDDINESS: Primary | ICD-10-CM

## 2021-09-30 DIAGNOSIS — R53.83 DECREASED STAMINA: ICD-10-CM

## 2021-09-30 PROCEDURE — 97112 NEUROMUSCULAR REEDUCATION: CPT | Performed by: PHYSICAL THERAPIST

## 2021-10-05 ENCOUNTER — OFFICE VISIT (OUTPATIENT)
Dept: PHYSICAL THERAPY | Facility: CLINIC | Age: 46
End: 2021-10-05
Payer: COMMERCIAL

## 2021-10-05 DIAGNOSIS — R53.83 DECREASED STAMINA: ICD-10-CM

## 2021-10-05 DIAGNOSIS — R42 MAL DE DEBARQUEMENT: ICD-10-CM

## 2021-10-05 DIAGNOSIS — R42 DIZZINESS AND GIDDINESS: Primary | ICD-10-CM

## 2021-10-05 PROCEDURE — 97110 THERAPEUTIC EXERCISES: CPT | Performed by: PHYSICAL THERAPIST

## 2021-10-05 PROCEDURE — 97112 NEUROMUSCULAR REEDUCATION: CPT | Performed by: PHYSICAL THERAPIST

## 2021-10-07 ENCOUNTER — OFFICE VISIT (OUTPATIENT)
Dept: PHYSICAL THERAPY | Facility: CLINIC | Age: 46
End: 2021-10-07
Payer: COMMERCIAL

## 2021-10-07 DIAGNOSIS — R42 DIZZINESS AND GIDDINESS: Primary | ICD-10-CM

## 2021-10-07 DIAGNOSIS — R53.83 DECREASED STAMINA: ICD-10-CM

## 2021-10-07 DIAGNOSIS — R42 MAL DE DEBARQUEMENT: ICD-10-CM

## 2021-10-07 PROCEDURE — 97110 THERAPEUTIC EXERCISES: CPT | Performed by: PHYSICAL THERAPIST

## 2021-10-07 PROCEDURE — 97112 NEUROMUSCULAR REEDUCATION: CPT | Performed by: PHYSICAL THERAPIST

## 2021-10-12 ENCOUNTER — OFFICE VISIT (OUTPATIENT)
Dept: PHYSICAL THERAPY | Facility: CLINIC | Age: 46
End: 2021-10-12
Payer: COMMERCIAL

## 2021-10-12 DIAGNOSIS — R42 MAL DE DEBARQUEMENT: ICD-10-CM

## 2021-10-12 DIAGNOSIS — R53.83 DECREASED STAMINA: ICD-10-CM

## 2021-10-12 DIAGNOSIS — R42 DIZZINESS AND GIDDINESS: Primary | ICD-10-CM

## 2021-10-12 PROCEDURE — 97112 NEUROMUSCULAR REEDUCATION: CPT | Performed by: PHYSICAL THERAPIST

## 2021-10-12 PROCEDURE — 97110 THERAPEUTIC EXERCISES: CPT | Performed by: PHYSICAL THERAPIST

## 2021-10-14 ENCOUNTER — OFFICE VISIT (OUTPATIENT)
Dept: PHYSICAL THERAPY | Facility: CLINIC | Age: 46
End: 2021-10-14
Payer: COMMERCIAL

## 2021-10-14 DIAGNOSIS — R42 MAL DE DEBARQUEMENT: ICD-10-CM

## 2021-10-14 DIAGNOSIS — R42 DIZZINESS AND GIDDINESS: Primary | ICD-10-CM

## 2021-10-14 DIAGNOSIS — R53.83 DECREASED STAMINA: ICD-10-CM

## 2021-10-14 PROCEDURE — 97112 NEUROMUSCULAR REEDUCATION: CPT | Performed by: PHYSICAL THERAPIST

## 2021-10-14 PROCEDURE — 97110 THERAPEUTIC EXERCISES: CPT | Performed by: PHYSICAL THERAPIST

## 2021-10-15 ENCOUNTER — ANNUAL EXAM (OUTPATIENT)
Dept: OBGYN CLINIC | Facility: CLINIC | Age: 46
End: 2021-10-15
Payer: COMMERCIAL

## 2021-10-15 VITALS
WEIGHT: 180.2 LBS | SYSTOLIC BLOOD PRESSURE: 106 MMHG | BODY MASS INDEX: 31.93 KG/M2 | HEIGHT: 63 IN | DIASTOLIC BLOOD PRESSURE: 62 MMHG

## 2021-10-15 DIAGNOSIS — Z12.31 ENCOUNTER FOR SCREENING MAMMOGRAM FOR MALIGNANT NEOPLASM OF BREAST: ICD-10-CM

## 2021-10-15 DIAGNOSIS — Z30.41 ENCOUNTER FOR BIRTH CONTROL PILLS MAINTENANCE: ICD-10-CM

## 2021-10-15 DIAGNOSIS — Z01.419 ROUTINE GYNECOLOGICAL EXAMINATION: Primary | ICD-10-CM

## 2021-10-15 DIAGNOSIS — Z12.4 SCREENING FOR MALIGNANT NEOPLASM OF CERVIX: ICD-10-CM

## 2021-10-15 DIAGNOSIS — N64.4 NIPPLE PAIN: ICD-10-CM

## 2021-10-15 PROBLEM — R42 MAL DE DEBARQUEMENT: Status: ACTIVE | Noted: 2019-04-12

## 2021-10-15 PROBLEM — D75.1 ERYTHROCYTOSIS: Status: ACTIVE | Noted: 2021-10-15

## 2021-10-15 PROBLEM — H81.09 MENIERE'S DISEASE: Status: ACTIVE | Noted: 2021-04-09

## 2021-10-15 PROBLEM — IMO0001 CONTRACEPTION: Status: ACTIVE | Noted: 2021-10-15

## 2021-10-15 PROBLEM — F41.0 GENERALIZED ANXIETY DISORDER WITH PANIC ATTACKS: Status: ACTIVE | Noted: 2019-04-12

## 2021-10-15 PROBLEM — R91.8 ABNORMAL CT SCAN OF LUNG: Status: ACTIVE | Noted: 2021-10-15

## 2021-10-15 PROBLEM — K52.839 MICROSCOPIC COLITIS: Status: ACTIVE | Noted: 2020-04-07

## 2021-10-15 PROBLEM — F41.1 GENERALIZED ANXIETY DISORDER WITH PANIC ATTACKS: Status: ACTIVE | Noted: 2019-04-12

## 2021-10-15 PROBLEM — G43.909 MIGRAINES: Status: ACTIVE | Noted: 2021-10-15

## 2021-10-15 PROBLEM — U07.1 COVID-19: Status: ACTIVE | Noted: 2021-10-15

## 2021-10-15 PROCEDURE — 99396 PREV VISIT EST AGE 40-64: CPT | Performed by: ADVANCED PRACTICE MIDWIFE

## 2021-10-15 PROCEDURE — G0476 HPV COMBO ASSAY CA SCREEN: HCPCS | Performed by: ADVANCED PRACTICE MIDWIFE

## 2021-10-15 PROCEDURE — G0145 SCR C/V CYTO,THINLAYER,RESCR: HCPCS | Performed by: ADVANCED PRACTICE MIDWIFE

## 2021-10-19 ENCOUNTER — OFFICE VISIT (OUTPATIENT)
Dept: PHYSICAL THERAPY | Facility: CLINIC | Age: 46
End: 2021-10-19
Payer: COMMERCIAL

## 2021-10-19 DIAGNOSIS — R53.83 DECREASED STAMINA: ICD-10-CM

## 2021-10-19 DIAGNOSIS — R42 MAL DE DEBARQUEMENT: ICD-10-CM

## 2021-10-19 DIAGNOSIS — R42 DIZZINESS AND GIDDINESS: Primary | ICD-10-CM

## 2021-10-19 PROCEDURE — 97110 THERAPEUTIC EXERCISES: CPT | Performed by: PHYSICAL THERAPIST

## 2021-10-19 PROCEDURE — 97112 NEUROMUSCULAR REEDUCATION: CPT | Performed by: PHYSICAL THERAPIST

## 2021-10-20 LAB
HPV HR 12 DNA CVX QL NAA+PROBE: NEGATIVE
HPV16 DNA CVX QL NAA+PROBE: NEGATIVE
HPV18 DNA CVX QL NAA+PROBE: NEGATIVE

## 2021-10-21 ENCOUNTER — TELEPHONE (OUTPATIENT)
Dept: INTERVENTIONAL RADIOLOGY/VASCULAR | Facility: HOSPITAL | Age: 46
End: 2021-10-21

## 2021-10-21 LAB
LAB AP GYN PRIMARY INTERPRETATION: NORMAL
Lab: NORMAL

## 2021-10-22 ENCOUNTER — APPOINTMENT (OUTPATIENT)
Dept: PHYSICAL THERAPY | Facility: CLINIC | Age: 46
End: 2021-10-22
Payer: COMMERCIAL

## 2021-10-22 ENCOUNTER — TELEPHONE (OUTPATIENT)
Dept: PSYCHIATRY | Facility: CLINIC | Age: 46
End: 2021-10-22

## 2021-10-25 ENCOUNTER — TELEPHONE (OUTPATIENT)
Dept: INTERVENTIONAL RADIOLOGY/VASCULAR | Facility: HOSPITAL | Age: 46
End: 2021-10-25

## 2021-10-26 ENCOUNTER — OFFICE VISIT (OUTPATIENT)
Dept: PHYSICAL THERAPY | Facility: CLINIC | Age: 46
End: 2021-10-26
Payer: COMMERCIAL

## 2021-10-26 DIAGNOSIS — R42 DIZZINESS AND GIDDINESS: Primary | ICD-10-CM

## 2021-10-26 DIAGNOSIS — R53.83 DECREASED STAMINA: ICD-10-CM

## 2021-10-26 DIAGNOSIS — R42 MAL DE DEBARQUEMENT: ICD-10-CM

## 2021-10-26 PROCEDURE — 97112 NEUROMUSCULAR REEDUCATION: CPT | Performed by: PHYSICAL THERAPIST

## 2021-10-28 ENCOUNTER — OFFICE VISIT (OUTPATIENT)
Dept: PHYSICAL THERAPY | Facility: CLINIC | Age: 46
End: 2021-10-28
Payer: COMMERCIAL

## 2021-10-28 DIAGNOSIS — R53.83 DECREASED STAMINA: ICD-10-CM

## 2021-10-28 DIAGNOSIS — R42 DIZZINESS AND GIDDINESS: Primary | ICD-10-CM

## 2021-10-28 DIAGNOSIS — R42 MAL DE DEBARQUEMENT: ICD-10-CM

## 2021-10-28 PROCEDURE — 97112 NEUROMUSCULAR REEDUCATION: CPT | Performed by: PHYSICAL THERAPIST

## 2021-11-02 ENCOUNTER — OFFICE VISIT (OUTPATIENT)
Dept: PHYSICAL THERAPY | Facility: CLINIC | Age: 46
End: 2021-11-02
Payer: COMMERCIAL

## 2021-11-02 DIAGNOSIS — R42 DIZZINESS AND GIDDINESS: Primary | ICD-10-CM

## 2021-11-02 DIAGNOSIS — R42 MAL DE DEBARQUEMENT: ICD-10-CM

## 2021-11-02 DIAGNOSIS — R53.83 DECREASED STAMINA: ICD-10-CM

## 2021-11-02 PROCEDURE — 97112 NEUROMUSCULAR REEDUCATION: CPT | Performed by: PHYSICAL THERAPIST

## 2021-11-04 ENCOUNTER — OFFICE VISIT (OUTPATIENT)
Dept: PHYSICAL THERAPY | Facility: CLINIC | Age: 46
End: 2021-11-04
Payer: COMMERCIAL

## 2021-11-04 DIAGNOSIS — R42 DIZZINESS AND GIDDINESS: Primary | ICD-10-CM

## 2021-11-04 DIAGNOSIS — R25.3 FASCICULATION: ICD-10-CM

## 2021-11-04 DIAGNOSIS — R42 MAL DE DEBARQUEMENT: ICD-10-CM

## 2021-11-04 DIAGNOSIS — R53.83 DECREASED STAMINA: ICD-10-CM

## 2021-11-04 PROCEDURE — 97112 NEUROMUSCULAR REEDUCATION: CPT | Performed by: PHYSICAL THERAPIST

## 2021-11-09 ENCOUNTER — OFFICE VISIT (OUTPATIENT)
Dept: PHYSICAL THERAPY | Facility: CLINIC | Age: 46
End: 2021-11-09
Payer: COMMERCIAL

## 2021-11-09 DIAGNOSIS — R42 DIZZINESS AND GIDDINESS: ICD-10-CM

## 2021-11-09 DIAGNOSIS — R53.83 DECREASED STAMINA: ICD-10-CM

## 2021-11-09 DIAGNOSIS — R25.3 FASCICULATION: Primary | ICD-10-CM

## 2021-11-09 DIAGNOSIS — R42 MAL DE DEBARQUEMENT: ICD-10-CM

## 2021-11-09 PROCEDURE — 97112 NEUROMUSCULAR REEDUCATION: CPT | Performed by: PHYSICAL THERAPIST

## 2021-11-11 ENCOUNTER — OFFICE VISIT (OUTPATIENT)
Dept: PHYSICAL THERAPY | Facility: CLINIC | Age: 46
End: 2021-11-11
Payer: COMMERCIAL

## 2021-11-11 DIAGNOSIS — R25.3 FASCICULATION: Primary | ICD-10-CM

## 2021-11-11 DIAGNOSIS — R42 MAL DE DEBARQUEMENT: ICD-10-CM

## 2021-11-11 DIAGNOSIS — R53.83 DECREASED STAMINA: ICD-10-CM

## 2021-11-11 DIAGNOSIS — R42 DIZZINESS AND GIDDINESS: ICD-10-CM

## 2021-11-11 PROCEDURE — 97112 NEUROMUSCULAR REEDUCATION: CPT | Performed by: PHYSICAL THERAPIST

## 2021-11-15 ENCOUNTER — OFFICE VISIT (OUTPATIENT)
Dept: PHYSICAL THERAPY | Facility: CLINIC | Age: 46
End: 2021-11-15
Payer: COMMERCIAL

## 2021-11-15 DIAGNOSIS — R53.83 DECREASED STAMINA: ICD-10-CM

## 2021-11-15 DIAGNOSIS — R42 DIZZINESS AND GIDDINESS: ICD-10-CM

## 2021-11-15 DIAGNOSIS — R25.3 FASCICULATION: Primary | ICD-10-CM

## 2021-11-15 DIAGNOSIS — R42 MAL DE DEBARQUEMENT: ICD-10-CM

## 2021-11-15 PROCEDURE — 97112 NEUROMUSCULAR REEDUCATION: CPT | Performed by: PHYSICAL THERAPIST

## 2021-11-16 ENCOUNTER — HOSPITAL ENCOUNTER (OUTPATIENT)
Dept: ULTRASOUND IMAGING | Facility: HOSPITAL | Age: 46
Discharge: HOME | End: 2021-11-16
Payer: COMMERCIAL

## 2021-11-16 DIAGNOSIS — E04.2 NONTOXIC MULTINODULAR GOITER: ICD-10-CM

## 2021-11-16 PROCEDURE — 76536 US EXAM OF HEAD AND NECK: CPT

## 2021-11-18 ENCOUNTER — OFFICE VISIT (OUTPATIENT)
Dept: PHYSICAL THERAPY | Facility: CLINIC | Age: 46
End: 2021-11-18
Payer: COMMERCIAL

## 2021-11-18 DIAGNOSIS — R53.83 DECREASED STAMINA: ICD-10-CM

## 2021-11-18 DIAGNOSIS — R42 MAL DE DEBARQUEMENT: ICD-10-CM

## 2021-11-18 DIAGNOSIS — R25.3 FASCICULATION: Primary | ICD-10-CM

## 2021-11-18 DIAGNOSIS — R42 DIZZINESS AND GIDDINESS: ICD-10-CM

## 2021-11-18 PROCEDURE — 97112 NEUROMUSCULAR REEDUCATION: CPT | Performed by: PHYSICAL THERAPIST

## 2021-11-22 ENCOUNTER — OFFICE VISIT (OUTPATIENT)
Dept: PHYSICAL THERAPY | Facility: CLINIC | Age: 46
End: 2021-11-22
Payer: COMMERCIAL

## 2021-11-22 DIAGNOSIS — R42 MAL DE DEBARQUEMENT: ICD-10-CM

## 2021-11-22 DIAGNOSIS — R42 DIZZINESS AND GIDDINESS: ICD-10-CM

## 2021-11-22 DIAGNOSIS — R53.83 DECREASED STAMINA: ICD-10-CM

## 2021-11-22 DIAGNOSIS — R25.3 FASCICULATION: Primary | ICD-10-CM

## 2021-11-22 PROCEDURE — 97110 THERAPEUTIC EXERCISES: CPT | Performed by: PHYSICAL THERAPIST

## 2021-11-22 PROCEDURE — 97140 MANUAL THERAPY 1/> REGIONS: CPT | Performed by: PHYSICAL THERAPIST

## 2021-11-24 ENCOUNTER — APPOINTMENT (OUTPATIENT)
Dept: PHYSICAL THERAPY | Facility: CLINIC | Age: 46
End: 2021-11-24
Payer: COMMERCIAL

## 2021-11-29 ENCOUNTER — EVALUATION (OUTPATIENT)
Dept: PHYSICAL THERAPY | Facility: CLINIC | Age: 46
End: 2021-11-29
Payer: COMMERCIAL

## 2021-11-29 DIAGNOSIS — R42 MAL DE DEBARQUEMENT: ICD-10-CM

## 2021-11-29 DIAGNOSIS — R53.83 DECREASED STAMINA: ICD-10-CM

## 2021-11-29 DIAGNOSIS — R25.3 FASCICULATION: Primary | ICD-10-CM

## 2021-11-29 DIAGNOSIS — R42 DIZZINESS AND GIDDINESS: ICD-10-CM

## 2021-11-29 PROCEDURE — 97140 MANUAL THERAPY 1/> REGIONS: CPT | Performed by: PHYSICAL THERAPIST

## 2021-12-10 ENCOUNTER — OFFICE VISIT (OUTPATIENT)
Dept: PHYSICAL THERAPY | Facility: CLINIC | Age: 46
End: 2021-12-10
Payer: COMMERCIAL

## 2021-12-10 DIAGNOSIS — R42 MAL DE DEBARQUEMENT: ICD-10-CM

## 2021-12-10 DIAGNOSIS — R25.3 FASCICULATION: Primary | ICD-10-CM

## 2021-12-10 DIAGNOSIS — R42 DIZZINESS AND GIDDINESS: ICD-10-CM

## 2021-12-10 DIAGNOSIS — R53.83 DECREASED STAMINA: ICD-10-CM

## 2021-12-10 PROCEDURE — 97112 NEUROMUSCULAR REEDUCATION: CPT | Performed by: PHYSICAL THERAPIST

## 2021-12-17 ENCOUNTER — OFFICE VISIT (OUTPATIENT)
Dept: PHYSICAL THERAPY | Facility: CLINIC | Age: 46
End: 2021-12-17
Payer: COMMERCIAL

## 2021-12-17 DIAGNOSIS — R42 MAL DE DEBARQUEMENT: ICD-10-CM

## 2021-12-17 DIAGNOSIS — R42 DIZZINESS AND GIDDINESS: ICD-10-CM

## 2021-12-17 DIAGNOSIS — R53.83 DECREASED STAMINA: ICD-10-CM

## 2021-12-17 DIAGNOSIS — R25.3 FASCICULATION: Primary | ICD-10-CM

## 2021-12-17 PROCEDURE — 97112 NEUROMUSCULAR REEDUCATION: CPT | Performed by: PHYSICAL THERAPIST

## 2021-12-22 ENCOUNTER — OFFICE VISIT (OUTPATIENT)
Dept: PHYSICAL THERAPY | Facility: CLINIC | Age: 46
End: 2021-12-22
Payer: COMMERCIAL

## 2021-12-22 DIAGNOSIS — R25.3 FASCICULATION: ICD-10-CM

## 2021-12-22 DIAGNOSIS — R42 MAL DE DEBARQUEMENT: ICD-10-CM

## 2021-12-22 DIAGNOSIS — R53.83 DECREASED STAMINA: ICD-10-CM

## 2021-12-22 DIAGNOSIS — R42 DIZZINESS AND GIDDINESS: Primary | ICD-10-CM

## 2021-12-22 PROCEDURE — 97112 NEUROMUSCULAR REEDUCATION: CPT

## 2021-12-23 ENCOUNTER — APPOINTMENT (OUTPATIENT)
Dept: PHYSICAL THERAPY | Facility: CLINIC | Age: 46
End: 2021-12-23
Payer: COMMERCIAL

## 2021-12-29 ENCOUNTER — APPOINTMENT (OUTPATIENT)
Dept: PHYSICAL THERAPY | Facility: CLINIC | Age: 46
End: 2021-12-29
Payer: COMMERCIAL

## 2021-12-30 ENCOUNTER — APPOINTMENT (OUTPATIENT)
Dept: PHYSICAL THERAPY | Facility: CLINIC | Age: 46
End: 2021-12-30
Payer: COMMERCIAL

## 2021-12-30 ENCOUNTER — EVALUATION (OUTPATIENT)
Dept: PHYSICAL THERAPY | Facility: CLINIC | Age: 46
End: 2021-12-30
Payer: COMMERCIAL

## 2021-12-30 DIAGNOSIS — R25.3 FASCICULATION: ICD-10-CM

## 2021-12-30 DIAGNOSIS — R42 DIZZINESS AND GIDDINESS: Primary | ICD-10-CM

## 2021-12-30 DIAGNOSIS — R42 MAL DE DEBARQUEMENT: ICD-10-CM

## 2021-12-30 DIAGNOSIS — R53.83 DECREASED STAMINA: ICD-10-CM

## 2021-12-30 PROCEDURE — 97112 NEUROMUSCULAR REEDUCATION: CPT | Performed by: PHYSICAL THERAPIST

## 2022-02-21 ENCOUNTER — HOSPITAL ENCOUNTER (OUTPATIENT)
Dept: RADIOLOGY | Facility: HOSPITAL | Age: 47
Discharge: HOME | End: 2022-02-21
Payer: COMMERCIAL

## 2022-02-21 DIAGNOSIS — M54.6 PAIN IN THORACIC SPINE: ICD-10-CM

## 2022-02-21 DIAGNOSIS — S99.922A INJURY OF LEFT FOOT, INITIAL ENCOUNTER: ICD-10-CM

## 2022-02-21 PROCEDURE — 72070 X-RAY EXAM THORAC SPINE 2VWS: CPT

## 2022-02-21 PROCEDURE — 73630 X-RAY EXAM OF FOOT: CPT

## 2022-03-30 ENCOUNTER — EVALUATION (OUTPATIENT)
Dept: PHYSICAL THERAPY | Facility: CLINIC | Age: 47
End: 2022-03-30
Payer: COMMERCIAL

## 2022-03-30 DIAGNOSIS — R42 DIZZINESS: Primary | ICD-10-CM

## 2022-03-30 PROCEDURE — 97162 PT EVAL MOD COMPLEX 30 MIN: CPT

## 2022-03-30 NOTE — PROGRESS NOTES
PT Evaluation     Today's date: 3/30/2022  Patient name: Micheal Mattson  : 1975  MRN: [id number]  Referring provider: Berenice Riley MD  Dx:   Encounter Diagnosis     ICD-10-CM    1  Dizziness  R42        Start Time: 1500  Stop Time: 1309  Total time in clinic (min): 44 minutes    Assessment  Assessment details: Patient is a 55 y o  female who reports to outpatient physical therapy with dizziness/lightheadedness  Probable movement impairment diagnosis of vestibular dysfunction resulting in pathoanatomical symptoms of dizziness, lightheadedness, unsteadiness, gait/balance dysfunction and limiting ability to complete normal ADLs and work w/out symptoms  Skilled physical therapy is warranted for the application of the interventions found below in the planned intervention section  Vestibular Exercises printed and reviewed with patient for her to resume HEP routine  Etiologic factors include chronic vestibular dysfunction  Prognosis is good given HEP compliance and attendance to physical therapy 2x a week for 8 weeks  Positive prognostic indicators include decreasing symptoms, positive previous response to PT  Please contact me if you have any questions or recommendations  Thank you for the referral and the opportunity to share in Di's care  Patient verbalized understanding of POC, HEP, and return demonstrated HEP  Impairments: abnormal gait, activity intolerance, impaired balance, lacks appropriate home exercise program and pain with function    Goals  1  Pt will demonstrate independence w/HEP for maintenance  2  Pt will demonstrate >50% reduction in dizziness/lightheaded symptoms  3  Pt will establish neurocom baseline test    4  Pt will report ability to ambulate throughout day without being limited by lightheadedness/dizziness  Plan  Plan details: Neurocom balance assessment     Planned therapy interventions: manual therapy, neuromuscular re-education, patient education, therapeutic exercise, therapeutic activities, home exercise program, gait training and balance  Frequency: 2x week  Duration in weeks: 12  Plan of Care beginning date: 3/30/2022  Plan of Care expiration date: 6/30/2022  Treatment plan discussed with: patient        Subjective Evaluation    History of Present Illness  Mechanism of injury: Kenny Nicole presents to outpatient physical therapy with a referral for dizziness  Previously had PT here for several months- got to a point where she wasn't making anymore progress  Kenny Nicole reports she is better than she was previous, but now is still having dizziness- not spinning dizziness, but lightheadedness  Increased symptoms a few weeks ago  Symptoms have decreased since increasing ativan a week ago, within prescribed limit  HPI/Primary Complaint: lightheadedness, balance issues  Denies this feeling like BPPV, denies neck pain  Denies vision being affected  Relevant PMH: previous PT; had tubes placed in ears last year, out now;   Work/School: nurse  Hobbies/exercise: walking  Pain location: L ankle from fall recently  Aggravating factors: changes in weather (vestibular migraine), stress, squatting, starting and stopping on feet/moving around  Relieving factors: none  Activity Limitations: going anywhere, doing anything; squatting   Patient Goals: get rid of dizziness   MD notes: R ear response 48% weaker on Right side    Social Support  Stairs in house: yes   Lives with: spouse    Employment status: working        Objective     Concurrent Complaints  Positive for headaches, nausea/motion sickness, tinnitus, visual change, hearing loss, memory loss and poor concentration  Neuro Exam:     Dizziness  Positive for vertigo, motion sickness, light-headedness and rocking or swaying  Negative for diplopia  Exacerbating factors  Positive for looking up  Negative for bending over, rolling in bed, turning head and supine to/from sitting       Headaches   Patient reports headaches: Yes       Oculomotor exam   Resting nystagmus: not present   Gaze holding nystagmus: not present left  and not present right  Smooth pursuits: within normal limits  Head thrust: left normal and right normal    Positional testing   [place]   Left posterior canal: WNL  Right posterior canal: WNL  Roll test   Left horizontal canal: WNL  Right horizontal canal: WNL  Positional testing comment: Balance:   FT EO= WFL  FT EC= WFL  Tandem= WFL  SLS= <15"               Re-eval Date: 4/30  HEP: [id number]     Date 3/30            Visit Count 1            FOTO             Pain In             Pain Out               Manuals 3/20                                                                Neuro Re-Ed             Gaze Stabilization             VOR near/far             VOR cancel             Habituation             Static Balance                                       Ther Ex             Neurocom *baseline                                                                                                       Ther Activity             Walking w/head turns/nods                                                    Gait Training                                       Modalities

## 2022-04-06 ENCOUNTER — OFFICE VISIT (OUTPATIENT)
Dept: PHYSICAL THERAPY | Facility: CLINIC | Age: 47
End: 2022-04-06
Payer: COMMERCIAL

## 2022-04-06 DIAGNOSIS — R42 DIZZINESS: Primary | ICD-10-CM

## 2022-04-06 PROCEDURE — 97112 NEUROMUSCULAR REEDUCATION: CPT | Performed by: PHYSICAL THERAPIST

## 2022-04-06 NOTE — PROGRESS NOTES
Daily Note     Today's date: 2022  Patient name: Gianna Balbuena  : 1975  MRN: [id number]  Referring provider: Guero Ruiz MD  Dx:   Encounter Diagnosis     ICD-10-CM    1  Dizziness  R42                   Subjective: The patient states that she is lightheaded today  She notes that she "feels off today" from the rainy weather  Yesterday she notes that while sitting she had felt off balance and needed to grab the table to steady herself  Objective: See treatment diary below      Assessment: Initiated TE as outlined below in daily treatment diary  Tolerated treatment fair  She is challenged with balance TE while on foam with EC, increased sway noted  Patient would benefit from continued PT  Plan: Continue per plan of care  Progress as able in upcoming visits         Precautions: Falls, Mal de Embarquement  Re-eval Date:   HEP: [id number]     Date 3/30 4/6           Visit Count 1 2            FOTO             Pain In             Pain Out               Manuals 3/20 4/6                                                               Neuro Re-Ed             Gaze Stabilization             VOR near  R/L & Up/down  Stand  Feet together  Busy   60" each           VOR Far  R/L & Up/down  Stand  Feet together  Busy   60" each           VOR cancel             Habituation             Static Balance  Foam [place]  EO & EC  30" x 2 ea            Diagonals  Stand  Busy  R/L - high  R/L - low  60" each  Eyes only then Eyes/Head                        Ther Ex             Neurocom *baseline                                                                                                       Ther Activity             Walking w/head turns/nods  15' x 4 each w/head turns and nods  CGA                                                  Gait Training                                       Modalities

## 2022-04-07 ENCOUNTER — APPOINTMENT (OUTPATIENT)
Dept: RADIOLOGY | Facility: MEDICAL CENTER | Age: 47
End: 2022-04-07
Payer: COMMERCIAL

## 2022-04-07 ENCOUNTER — OFFICE VISIT (OUTPATIENT)
Dept: PODIATRY | Facility: CLINIC | Age: 47
End: 2022-04-07
Payer: COMMERCIAL

## 2022-04-07 VITALS
BODY MASS INDEX: 31.18 KG/M2 | OXYGEN SATURATION: 99 % | DIASTOLIC BLOOD PRESSURE: 68 MMHG | SYSTOLIC BLOOD PRESSURE: 130 MMHG | HEART RATE: 89 BPM | HEIGHT: 63 IN | WEIGHT: 176 LBS

## 2022-04-07 DIAGNOSIS — M79.672 PAIN IN LEFT FOOT: Primary | ICD-10-CM

## 2022-04-07 DIAGNOSIS — M79.672 PAIN IN LEFT FOOT: ICD-10-CM

## 2022-04-07 DIAGNOSIS — S90.32XA CONTUSION OF LEFT FOOT, INITIAL ENCOUNTER: ICD-10-CM

## 2022-04-07 PROCEDURE — 99204 OFFICE O/P NEW MOD 45 MIN: CPT | Performed by: PODIATRIST

## 2022-04-07 PROCEDURE — 73630 X-RAY EXAM OF FOOT: CPT

## 2022-04-07 NOTE — PROGRESS NOTES
Podiatry - Clinic Visit  Savi Landry 55 y o  female MRN: [id number]    Assessment/Plan    No problem-specific Assessment & Plan notes found for this encounter  {Assess/plansmartlinks:70512}     Plan:  - Pt seen/examined  - injection of the {RIGHT/LEFT/BILATERAL:56388} plantar fascia as prior  - Rx given for night splint and CMO  - instructions given for conservative care which was also demonstrated during the clinical visit  - Explained at length the biomechanical abnormalities leading to the significant overload of the plantar fascia  - Will consider the addition of PT on the next visit to aide in reduction of equinus and also address the plantar fascia and gastroc aponeurosis with graston technique  - Recommend Asics, Vionic, New balance, or rigid shoes at all times in the house  -{Treatment options:74317}    History of Present Illness     HPI:  Patient presents with pain to the {RIGHT/LEFT/BILATERAL:61160}  {Anatomy; location foot:64375}  This has been present for *** days weeks  They {Have have not:08168} experienced {foot pain options:02873}  They have attempted {footHXtx:45744} {WITH/WITHOUT:34825} success  They are having continued pain  Review of Systems   Constitutional: Negative  HENT: Negative  Eyes: Negative  Respiratory: Negative  Cardiovascular: Negative  Gastrointestinal: Negative  Musculoskeletal: ***   Skin: negative  Neurological: Negative          Historical Information   Past Medical History:   Diagnosis Date    Breast pain     GERD (gastroesophageal reflux disease)     Mal de debarquement     Migraine      Past Surgical History:   Procedure Laterality Date    CERVIX SURGERY      EAR SURGERY Bilateral     FL INJECTION LEFT HIP (ARTHROGRAM)  6/25/2019     Social History   Social History     Substance and Sexual Activity   Alcohol Use Yes    Comment: socially     Social History     Substance and Sexual Activity   Drug Use No     Social History Tobacco Use   Smoking Status Former Smoker   Smokeless Tobacco Never Used     Family History:   Family History   Problem Relation Age of Onset    Hyperparathyroidism Mother     Heart disease Father     Diabetes Father     Stroke Maternal Grandmother     Lung cancer Maternal Grandfather     Heart disease Maternal Aunt     No Known Problems Paternal Aunt     No Known Problems Paternal Aunt     No Known Problems Paternal Aunt     Leukemia Paternal Uncle     Leukemia Paternal Grandfather     Heart disease Paternal Grandmother        Meds/Allergies   (Not in a hospital admission)    Allergies   Allergen Reactions    Amoxicillin-Pot Clavulanate Hives    Codeine Hives and Rash    Klonopin [Clonazepam] Anxiety     Tolerates Valium    Latex Anaphylaxis     Other reaction(s): respiratory symptoms    Gadobenate Rash    Gadobutrol Rash    Pheniramine Rash     In MultiHist D    Phenylpropanolamine      In MultiHist D    Phenyltoloxamine      in MultiHist D    Pyrilamine      In MultiHist D       Objective   First Vitals:   @VSFIRST2(5,8,6,7,9,11,14,10:FIRST)@    Current Vitals:   Blood Pressure: 130/68 (04/07/22 0900)  Pulse: 89 (04/07/22 0900)  Height: 5' 3" (160 cm) (04/07/22 0900)  Weight - Scale: 79 8 kg (176 lb) (04/07/22 0900)  SpO2: 99 % (04/07/22 0900)        /68 (BP Location: Left arm, Patient Position: Sitting, Cuff Size: Large)   Pulse 89   Ht 5' 3" (1 6 m)   Wt 79 8 kg (176 lb)   SpO2 99%   BMI 31 18 kg/m²     General Appearance:    Alert, cooperative, no distress    Constitutional: Patient is not distressed  Patient is well developed  Patient is *** obese  Extremities:   MMT is ***/5 to all compartments of the LE, +***/4 edema B/L, Digital ROM is intact, Pain on palpation of {PFPE:92959} Normal range of motion first MTPJ  Manual muscle testing 5 out of 5 for inversion/eversion/dorsiflexion/plantarflexion    On stance patients feet are generally {Foot Deformity:94001::"***"}  With knee extended patient is unable to get foot above 95 degrees indicated significant overload of the posterior musculature   Pulses:   R DP is +***/4, R PT is +***/4, L DP is +***/4, L PT is +***/4, CFT< 3sec to all digits  No erythema  No edema  No significant varicosities  Skin:   no open lesions  Normal texture, turgor  Dermatology: No rash  No open lesions  Present pedal hair  Skin has healthy turgor  Neurological: Monofilament sensation is intact  Vibratory sensation is intact  Achilles reflex is normal    Proprioception is normal    Respiratory: Normal respiratory effort, no distress    Psych: Patient is AAOx3  Normal mood       Lymphatic: nonpalpable popliteal lymph nodes  Nonpalpable groin lymph nodes

## 2022-04-07 NOTE — PATIENT INSTRUCTIONS
Contusion in Adults   WHAT YOU NEED TO KNOW:   A contusion is a bruise that appears on your skin after an injury  A bruise happens when small blood vessels tear but skin does not  Blood leaks into nearby tissue, such as soft tissue or muscle  DISCHARGE INSTRUCTIONS:   Return to the emergency department if:   · You have new trouble moving the injured area  · You have tingling or numbness in or near the injured area  · Your hand or foot below the bruise gets cold or turns pale  Call your doctor if:   · You find a new lump in the injured area  · Your symptoms do not improve with treatment after 4 to 5 days  · You have questions or concerns about your condition or care  Medicines: You may need any of the following:  · NSAIDs  help decrease swelling and pain or fever  This medicine is available with or without a doctor's order  NSAIDs can cause stomach bleeding or kidney problems in certain people  If you take blood thinner medicine, always ask your healthcare provider if NSAIDs are safe for you  Always read the medicine label and follow directions  · Prescription pain medicine  may be given  Ask your healthcare provider how to take this medicine safely  Some prescription pain medicines contain acetaminophen  Do not take other medicines that contain acetaminophen without talking to your healthcare provider  Too much acetaminophen may cause liver damage  Prescription pain medicine may cause constipation  Ask your healthcare provider how to prevent or treat constipation  · Take your medicine as directed  Contact your healthcare provider if you think your medicine is not helping or if you have side effects  Tell him of her if you are allergic to any medicine  Keep a list of the medicines, vitamins, and herbs you take  Include the amounts, and when and why you take them  Bring the list or the pill bottles to follow-up visits  Carry your medicine list with you in case of an emergency      Help daniela contusion heal:   · Rest the injured area  or use it less than usual  If you bruised your leg or foot, you may need crutches or a cane to help you walk  This will help you keep weight off your injured body part  · Apply ice  to decrease swelling and pain  Ice may also help prevent tissue damage  Use an ice pack, or put crushed ice in a plastic bag  Cover it with a towel and place it on your bruise for 15 to 20 minutes every hour or as directed  · Use compression  to support the area and decrease swelling  Wrap an elastic bandage around the area over the bruised muscle  Make sure the bandage is not too tight  You should be able to fit 1 finger between the bandage and your skin  · Elevate (raise) your injured body part  above the level of your heart to help decrease pain and swelling  Use pillows, blankets, or rolled towels to elevate the area as often as you can  · Do not drink alcohol  as directed  Alcohol may slow healing  · Do not stretch injured muscles  right after your injury  Ask your healthcare provider when and how you may safely stretch after your injury  Gentle stretches can help increase your flexibility  · Do not massage the area or put heating pads  on the bruise right after your injury  Heat and massage may slow healing  Your healthcare provider may tell you to apply heat after several days  At that time, heat will start to help the injury heal     Prevent another contusion:   · Stretch and warm up before you play sports or exercise  · Wear protective gear when you play sports  Examples are shin guards and padding  · If you begin a new physical activity, start slowly to give your body a chance to adjust     Follow up with your doctor as directed:  Write down your questions so you remember to ask them during your visits  © Copyright [employer] 2022 Information is for End User's use only and may not be sold, redistributed or otherwise used for commercial purposes   All illustrations and images included in CareNotes® are the copyrighted property of [employer]  or [employer] Houser  The above information is an  only  It is not intended as medical advice for individual conditions or treatments  Talk to your doctor, nurse or pharmacist before following any medical regimen to see if it is safe and effective for you

## 2022-04-07 NOTE — PROGRESS NOTES
PATIENT:  Edilson Kilgore  1975       ASSESSMENT:     1  Pain in left foot  XR foot 3+ vw left   2  Contusion of left foot, initial encounter  Cam Boot             PLAN:  1  Patient was counseled and educated on the condition and the diagnosis  2  X-ray was obtained and personally reviewed  The radiological findings were discussed with the patient  There is no evidence of fracture dislocation, no acute findings on x-ray  3  The exam and symptoms are consistent with bony contusion of the left lateral tubercle of her calcaneus  The diagnosis, treatment options and prognosis were discussed with the patient  4  Advised use of Aleve, and will immobilize in Cam boot for likely 4-6 weeks, this injury did happen in February and will and she will come to clinic to for further assessment in 3 weeks  Instructed supportive care, home exercise, icing, and proper footwear/ arch support  Discussed possible MRI depending on the progress  5  Patient will return in 3 weeks for re-evaluation  Subjective:       HPI  The patient presents with chief complaint of fall for about 3 months  she has pain on the outside of her left foot, she states that she was going to get ice cream and fell down the stairs and hit her back and also her left foot      The following portions of the patient's history were reviewed and updated as appropriate: allergies, current medications, past family history, past medical history, past social history, past surgical history and problem list   All pertinent labs and images were reviewed        Past Medical History  Past Medical History:   Diagnosis Date    Breast pain     GERD (gastroesophageal reflux disease)     Mal de debarquement     Migraine        Past Surgical History  Past Surgical History:   Procedure Laterality Date    CERVIX SURGERY      EAR SURGERY Bilateral     FL INJECTION LEFT HIP (ARTHROGRAM)  6/25/2019        Allergies:  Amoxicillin-pot clavulanate, Codeine, Klonopin [clonazepam], Latex, Gadobenate, Gadobutrol, Pheniramine, Phenylpropanolamine, Phenyltoloxamine, and Pyrilamine    Medications:  Current Outpatient Medications   Medication Sig Dispense Refill    clindamycin (CLEOCIN T) 1 % external solution Apply topically 2 (two) times a day 60 mL 3    Junel 1/20 1-20 MG-MCG per tablet TAKE CONTINUOUSLY 84 tablet 3    LORazepam (ATIVAN) 0 5 mg tablet Take by mouth      meclizine (ANTIVERT) 25 mg tablet       norethindrone-ethinyl estradiol (JUNEL 1/20) 1-20 MG-MCG per tablet Take by mouth      ondansetron (ZOFRAN ODT) 4 mg disintegrating tablet       valACYclovir (VALTREX) 1,000 mg tablet       ZOLMitriptan (ZOMIG) 5 MG nasal solution into each nostril as needed for migraine      ZOLMitriptan (ZOMIG-ZMT) 5 MG disintegrating tablet       Biotin 10 MG CAPS Take by mouth      diazepam (VALIUM) 2 mg tablet Take one tab by mouth nightly for 2 weeks, then use every 6 hours as needed for vertigo      omeprazole (PriLOSEC) 20 mg delayed release capsule Take 20 mg by mouth daily      psyllium (METAMUCIL) 58 6 % powder Take 1 packet by mouth 3 (three) times a day      ranitidine (ZANTAC) 75 MG tablet Take 75 mg by mouth 2 (two) times a day (Patient not taking: Reported on 10/15/2021)      ZOLMitriptan (ZOMIG ZMT) 2 5 MG disintegrating tablet        No current facility-administered medications for this visit         Social History:  Social History     Socioeconomic History    Marital status: /Civil Union     Spouse name: None    Number of children: None    Years of education: None    Highest education level: None   Occupational History    None   Tobacco Use    Smoking status: Former Smoker    Smokeless tobacco: Never Used   Vaping Use    Vaping Use: Never used   Substance and Sexual Activity    Alcohol use: Yes     Comment: socially    Drug use: No    Sexual activity: Yes     Partners: Male     Birth control/protection: Pill Other Topics Concern    None   Social History Narrative    None     Social Determinants of Health     Financial Resource Strain: Not on file   Food Insecurity: Not on file   Transportation Needs: Not on file   Physical Activity: Not on file   Stress: Not on file   Social Connections: Not on file   Intimate Partner Violence: Not on file   Housing Stability: Not on file          Review of Systems   Constitutional: Negative for chills and fever  HENT: Negative for ear pain and sore throat  Eyes: Negative for pain and visual disturbance  Respiratory: Negative for cough and shortness of breath  Cardiovascular: Negative for chest pain and palpitations  Gastrointestinal: Negative for abdominal pain and vomiting  Genitourinary: Negative for dysuria and hematuria  Musculoskeletal: Negative for arthralgias and back pain  Skin: Negative for color change and rash  Neurological: Negative for seizures and syncope  All other systems reviewed and are negative  Objective:      /68 (BP Location: Left arm, Patient Position: Sitting, Cuff Size: Large)   Pulse 89   Ht 5' 3" (1 6 m)   Wt 79 8 kg (176 lb)   SpO2 99%   BMI 31 18 kg/m²          Physical Exam  Vitals reviewed  Constitutional:       Appearance: Normal appearance  She is obese  HENT:      Head: Normocephalic  Nose: Nose normal    Eyes:      Pupils: Pupils are equal, round, and reactive to light  Cardiovascular:      Rate and Rhythm: Normal rate  Pulses: Normal pulses  Pulmonary:      Effort: Pulmonary effort is normal    Musculoskeletal:         General: Normal range of motion  Comments: There is tenderness to palpation on the left lateral calcaneal tubercle , no tenderness to palpation along the plantar fascia proper, she does have a slight cavus foot but the pain is rather localized directly over her lateral calcaneus   Skin:     General: Skin is warm        Capillary Refill: Capillary refill takes less than 2 seconds  Neurological:      General: No focal deficit present  Mental Status: She is alert and oriented to person, place, and time     Psychiatric:         Mood and Affect: Mood normal          Behavior: Behavior normal

## 2022-04-07 NOTE — PROGRESS NOTES
Daily Note     Today's date: 2022  Patient name: Gianna Balbuena  : 1975  MRN: [id number]  Referring provider: Guero Ruiz MD  Dx:   Encounter Diagnosis     ICD-10-CM    1  Dizziness  R42                   Subjective: I went to dr and was placed in walking boot for bone bruise and now feeling more wobbly/unsteady   Lightheadedness is for the most part constant, changes with the weather  Hx of vestibular migraines      Objective: See treatment diary below      Assessment: Tolerated treatment well  Pt reported change of color of bird background to the color of red  Pt reported increase "twitching" end of rx session with pt provided 5 min grounding/seated with 7# wts  Reduce sx's indicated   Patient would benefit from continued PT      Plan: Continue per plan of care        Precautions: Falls, Mal de Embarquement  Re-eval Date:   HEP: [id number]     Date 3/30 4/6 4/8     Visit Count 1 2  3     FOTO        Pain In        Pain Out          Manuals 3/20 4/6                                      Neuro Re-Ed   W/cam boot LLE     Gaze Stabilization        VOR near  R/L & Up/down  Stand  Feet together  Busy   60" each Stand/firm  FT  Busy  1 min ea  105 horiz  110 vert     VOR Far  R/L & Up/down  Stand  Feet together  Busy   60" each Stand/firm  FT  Busy  1 min ea  115 horiz  120 vert     VOR cancel        Habituation        Static Balance  Foam Rhomberg  EO & EC  30" x 2 ea  Foam Rhomberg  EO & EC  30" x 2 ea      Diagonals  Stand  Busy  R/L - high  R/L - low  60" each  Eyes only then Eyes/Head Stand/busy  Horizontal  60"  Vertical 60"      Eyes/Head   Stand/busy  Horizontal 60"  Vertical 60"             Ther Ex        Neurocom *baseline                                                               Ther Activity        Walking w/head turns/nods  15' x 4 each w/head turns and nods  CGA 25' x 4 each w/head turns and nods  CS/CGA                             Gait Training                        Modalities

## 2022-04-08 ENCOUNTER — HOSPITAL ENCOUNTER (OUTPATIENT)
Dept: CT IMAGING | Facility: HOSPITAL | Age: 47
Discharge: HOME | End: 2022-04-08
Payer: COMMERCIAL

## 2022-04-08 ENCOUNTER — OFFICE VISIT (OUTPATIENT)
Dept: PHYSICAL THERAPY | Facility: CLINIC | Age: 47
End: 2022-04-08
Payer: COMMERCIAL

## 2022-04-08 DIAGNOSIS — R91.8 LUNG MASS: ICD-10-CM

## 2022-04-08 DIAGNOSIS — R42 DIZZINESS: Primary | ICD-10-CM

## 2022-04-08 PROCEDURE — 97112 NEUROMUSCULAR REEDUCATION: CPT

## 2022-04-08 PROCEDURE — 71250 CT THORAX DX C-: CPT

## 2022-04-08 PROCEDURE — G1004 CDSM NDSC: HCPCS

## 2022-04-13 ENCOUNTER — OFFICE VISIT (OUTPATIENT)
Dept: PHYSICAL THERAPY | Facility: CLINIC | Age: 47
End: 2022-04-13
Payer: COMMERCIAL

## 2022-04-13 DIAGNOSIS — R42 DIZZINESS: Primary | ICD-10-CM

## 2022-04-13 PROCEDURE — 97112 NEUROMUSCULAR REEDUCATION: CPT | Performed by: PHYSICAL THERAPIST

## 2022-04-20 ENCOUNTER — OFFICE VISIT (OUTPATIENT)
Dept: PHYSICAL THERAPY | Facility: CLINIC | Age: 47
End: 2022-04-20
Payer: COMMERCIAL

## 2022-04-20 DIAGNOSIS — R42 DIZZINESS: Primary | ICD-10-CM

## 2022-04-20 PROCEDURE — 97112 NEUROMUSCULAR REEDUCATION: CPT | Performed by: PHYSICAL THERAPIST
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
## 2022-04-27 ENCOUNTER — OFFICE VISIT (OUTPATIENT)
Dept: PODIATRY | Facility: CLINIC | Age: 47
End: 2022-04-27
Payer: COMMERCIAL

## 2022-04-27 ENCOUNTER — OFFICE VISIT (OUTPATIENT)
Dept: PHYSICAL THERAPY | Facility: CLINIC | Age: 47
End: 2022-04-27
Payer: COMMERCIAL

## 2022-04-27 VITALS — OXYGEN SATURATION: 98 % | BODY MASS INDEX: 31.18 KG/M2 | HEIGHT: 63 IN | HEART RATE: 73 BPM | WEIGHT: 176 LBS

## 2022-04-27 DIAGNOSIS — R42 DIZZINESS: Primary | ICD-10-CM

## 2022-04-27 DIAGNOSIS — S90.32XA CONTUSION OF LEFT FOOT, INITIAL ENCOUNTER: ICD-10-CM

## 2022-04-27 DIAGNOSIS — M79.672 PAIN IN LEFT FOOT: Primary | ICD-10-CM

## 2022-04-27 PROCEDURE — 97112 NEUROMUSCULAR REEDUCATION: CPT | Performed by: PHYSICAL THERAPIST

## 2022-04-27 PROCEDURE — 99213 OFFICE O/P EST LOW 20 MIN: CPT | Performed by: PODIATRIST

## 2022-04-27 NOTE — PROGRESS NOTES
Daily Note     Today's date: 2022  Patient name: Hue Nicole  : 1975  MRN: [id number]  Referring provider: Baldev Choi MD  Dx:   Encounter Diagnosis     ICD-10-CM    1  Dizziness  R42                   Subjective: Has been under a lot of stress  Wasn't able to sleep last night so has been a little more dizzy  Objective: See treatment diary below      Assessment: Tolerated treatment well but was more symptomatic with VOR cx with busy background  Overall demo fair balance despite CAM boot making it more difficult  Patient would benefit from continued PT      Plan: Progress treatment as tolerated         Precautions: Falls, Mal de Embarquement  Re-eval Date:   HEP: [id number]     Date    Visit Count 6  3 4 5   FOTO        Pain In        Pain Out          Manuals                                        Neuro Re-Ed   W/cam boot LLE     Gaze Stabilization        VOR near  R/L & Up/down Standing busy, level 1 min x 2 H/V  Stand/firm  FT  Busy  1 min ea  105 horiz  110 vert Standing, level busy 1 min x 2 H/V Standing, level busy 45" x 2    VOR Far  R/L & Up/down   Stand/firm  FT  Busy  1 min ea  115 horiz  120 vert     VOR cancel Standing level, busy 45" x 2   Standing, level 45" x 2 H/V Standing, level 45" x 2    Habituation        Static Balance FAEC foam 30" x 3     FTEO with HT/HN foam 30" x 2     semitandem level EC 30" x 2  Foam Rhomberg  EO & EC  30" x 2 ea  FAEC foam 30" x 3     FTEO with HT/HN foam 30" x 2  FAEC foam 30" x 3     FTEO with HT/HN foam 30" x 2    Diagonals   Stand/busy  Horizontal  60"  Vertical 60"      Eyes/Head   Stand/busy  Horizontal 60"  Vertical 60"     [facility] pick ups    10x     Ther Ex        Neurocom                                                                Ther Activity        Walking w/head turns/nods 20' x 4 H/V fw/bw   25' x 4 each w/head turns and nods  CS/CGA 20' x 4 H/V  20' x 4 H/V fw/bw    Walking with turns    180 deg 20' x 2 Gait Training                        Modalities

## 2022-04-27 NOTE — PROGRESS NOTES
Assessment/Plan:    No problem-specific Assessment & Plan notes found for this encounter  Diagnoses and all orders for this visit:    Pain in left foot    Contusion of left foot, initial encounter          Subjective:      Patient ID: Ani Mendoza is a 55 y o  female  Patient seen evaluated will for follow-up for left bony contusion, she states that she did re-injure it is slightly couple days ago  She has been immobilized in the boot strictly for the past 3 weeks  She notes significant improvement in her pain, she has minimal point tenderness when she stands  She does note some soreness after she landed on her heart the other day  The following portions of the patient's history were reviewed and updated as appropriate: allergies, current medications, past family history, past medical history, past social history, past surgical history and problem list     Review of Systems   Constitutional: Negative for chills and fever  HENT: Negative for ear pain and sore throat  Eyes: Negative for pain and visual disturbance  Respiratory: Negative for cough and shortness of breath  Cardiovascular: Negative for chest pain and palpitations  Gastrointestinal: Negative for abdominal pain and vomiting  Genitourinary: Negative for dysuria and hematuria  Musculoskeletal: Negative for arthralgias and back pain  Skin: Negative for color change and rash  Neurological: Negative for seizures and syncope  All other systems reviewed and are negative  Objective:      Pulse 73   Ht 5' 3" (1 6 m)   Wt 79 8 kg (176 lb)   SpO2 98%   BMI 31 18 kg/m²          Physical Exam  Vitals reviewed  Constitutional:       Appearance: Normal appearance  HENT:      Head: Normocephalic  Right Ear: Tympanic membrane normal       Nose: Nose normal       Mouth/Throat:      Mouth: Mucous membranes are moist    Eyes:      Pupils: Pupils are equal, round, and reactive to light     Cardiovascular: Rate and Rhythm: Normal rate  Pulses: Normal pulses  Pulmonary:      Effort: Pulmonary effort is normal    Musculoskeletal:         General: No swelling or tenderness  Skin:     General: Skin is warm  Capillary Refill: Capillary refill takes less than 2 seconds  Comments: Point tenderness on exam, no edema, no at the area erythema, no swelling no bruising   Neurological:      General: No focal deficit present  Mental Status: She is alert and oriented to person, place, and time  Mental status is at baseline  Psychiatric:         Mood and Affect: Mood normal          Behavior: Behavior normal          Thought Content:  Thought content normal

## 2022-05-04 ENCOUNTER — EVALUATION (OUTPATIENT)
Dept: PHYSICAL THERAPY | Facility: CLINIC | Age: 47
End: 2022-05-04
Payer: COMMERCIAL

## 2022-05-04 DIAGNOSIS — R42 DIZZINESS: Primary | ICD-10-CM

## 2022-05-04 PROCEDURE — 97112 NEUROMUSCULAR REEDUCATION: CPT | Performed by: PHYSICAL THERAPIST

## 2022-05-04 NOTE — PROGRESS NOTES
Progress Note     Today's date: 2022  Patient name: Kathleen Colón  : 1975  MRN: [id number]  Referring provider: Laurel Fuentes MD  Dx:   Encounter Diagnosis     ICD-10-CM    1  Dizziness  R42                   Subjective: Feels like she is off today from weather and has HA  Has trouble sleeping and has been waking up from being dizzy  Does not think she has changed with PT  The first time, in vestibular PT, she felt like PT helped a lot but not sure about this time  R shoulder has been hurting since fall in Feb but did not rate  Would like to keep trying with vestibular PT  Objective: See treatment diary below    Assessment: Progress note performed this session  Overall feels like she has not made significant improvement PT over last month  She demo normal oculomotor findings but was symptomatic with testing  She also demo good balance per FGA but demo minor balance deficits akila when utilizing vestibular cues per MCTSIB  At this time, she continues to be moderately limited due to dizziness and would benefit from continued PT to decrease dizziness and manage sxs  Goals  1  Pt will demonstrate independence w/HEP for maintenance  - MET  2  Pt will demonstrate >50% reduction in dizziness/lightheaded symptoms  - NOT MET   3  Pt will establish neurocom baseline test  - NOT MET  4  Pt will report ability to ambulate throughout day without being limited by lightheadedness/dizziness   - NOT MET     New LT week s  1  [address] < 31 to demo decreased dizziness  2   MCTSIB = 30 sec all conditions to demo improved balance      Plan  Planned therapy interventions: manual therapy, neuromuscular re-education, patient education, therapeutic exercise, therapeutic activities, home exercise program, gait training and balance  Frequency: 2x week  Duration in weeks: 8  Plan of Care beginning date: 2022  Plan of Care expiration date: 2022  Treatment plan discussed with: patient       PHYSICAL FINDINGS:  Oculomotor ROM : WNL  Resting nystagmus: No  Gaze holding nystagmus No   Smooth pursuit Normal, dizziness     Vertical Saccades: hypo, dizziness  Horizontal Saccades: hypo, dizziness  Convergence: Normal    Head thrust (room light): Normal    MCTSIB  30 eyes open firm surface   30 eyes closed form surface  30 eyes open foam surface  20 eyes closed foam surface    FGA: 5/4: 27/30    Positional testing: Right Left   [place] pike WNL WNL   Roll test: WNL WNL             Precautions: Falls, Mal de Embarquement  Re-eval Date: 6/4  HEP: [id number]     Date 4/27 5/4 4/8 4/13 4/20   Visit Count 6  3 4 5   FOTO        Pain In        Pain Out          Manuals                                        Neuro Re-Ed   W/cam boot LLE     Gaze Stabilization        VOR near  R/L & Up/down Standing busy, level 1 min x 2 H/V  Stand/firm  FT  Busy  1 min ea  105 horiz  110 vert Standing, level busy 1 min x 2 H/V Standing, level busy 45" x 2    VOR Far  R/L & Up/down   Stand/firm  FT  Busy  1 min ea  115 horiz  120 vert     VOR cancel Standing level, busy 45" x 2   Standing, level 45" x 2 H/V Standing, level 45" x 2    Habituation        Static Balance FAEC foam 30" x 3     FTEO with HT/HN foam 30" x 2     semitandem level EC 30" x 2  Foam Rhomberg  EO & EC  30" x 2 ea  FAEC foam 30" x 3     FTEO with HT/HN foam 30" x 2  FAEC foam 30" x 3     FTEO with HT/HN foam 30" x 2    Diagonals   Stand/busy  Horizontal  60"  Vertical 60"      Eyes/Head   Stand/busy  Horizontal 60"  Vertical 60"     [facility] pick ups    10x     Ther Ex        Neurocom                                                                Ther Activity        Walking w/head turns/nods 20' x 4 H/V fw/bw   25' x 4 each w/head turns and nods  CS/CGA 20' x 4 H/V  20' x 4 H/V fw/bw    Walking with turns    180 deg 20' x 2                    Gait Training                        Modalities

## 2022-05-05 NOTE — PROGRESS NOTES
Daily Note     Today's date: 2022  Patient name: Fernando Kenny  : 1975  MRN: [id number]  Referring provider: Martina Delgadillo MD  Dx:   Encounter Diagnosis     ICD-10-CM    1  Dizziness  R42                   Subjective: I dont feel as though I am making as much progress with PT this time compared to last time  Still in cam boot another week        Objective: See treatment diary below      Assessment: Tolerated treatment fairly well  Pt became more symptomatic with amb/180* turns  Pt reported decrease sx's end rx session after nustep activ  Pt possible DC of cam boot next week after f/u with MD  Patient demonstrated fatigue post treatment and would benefit from continued PT      Plan: Continue per plan of care        Precautions: Falls, Mal de Embarquement  Re-eval Date:   HEP: [id number]     Date      Visit Count 6  7     FOTO        Pain In        Pain Out          Manuals                                        Neuro Re-Ed   W/cam boot LLE     Gaze Stabilization        VOR near  R/L & Up/down Standing busy, level 1 min x 2 H/V  Stand/foam  FT  Busy  1 min ea  115 horiz  120 vert     VOR Far  R/L & Up/down   Stand/foam  FT  Busy  1 min ea  120 horiz  120 vert     VOR cancel Standing level, busy 45" x 2  Standing level, busy 45" x 2  Horiz/vert ea     Habituation        Static Balance FAEC foam 30" x 3     FTEO with HT/HN foam 30" x 2     semitandem level EC 30" x 2  FAEC foam 30" x 3     FTEO with HT/HN foam 30" x 2     Tandem level EC 60" x 1     Diagonals   Pt perform @ home  Stand/busy  Horizontal  60"  Vertical 60"      Amb with 180* turns   2x 48'  CS     Eyes/Head        [facility] pick ups        Ther Ex        Neurocom        Nustep   L5 8 min  End rx session                                                     Ther Activity        Walking w/head turns/nods 20' x 4 H/V fw/bw   25' x 4 each w/head turns and nods  CS/CGA     Walking with turns                        Gait Training Modalities

## 2022-05-06 ENCOUNTER — OFFICE VISIT (OUTPATIENT)
Dept: PHYSICAL THERAPY | Facility: CLINIC | Age: 47
End: 2022-05-06
Payer: COMMERCIAL

## 2022-05-06 DIAGNOSIS — R42 DIZZINESS: Primary | ICD-10-CM

## 2022-05-06 PROCEDURE — 97110 THERAPEUTIC EXERCISES: CPT

## 2022-05-06 PROCEDURE — 97112 NEUROMUSCULAR REEDUCATION: CPT

## 2022-05-11 ENCOUNTER — OFFICE VISIT (OUTPATIENT)
Dept: PHYSICAL THERAPY | Facility: CLINIC | Age: 47
End: 2022-05-11
Payer: COMMERCIAL

## 2022-05-11 DIAGNOSIS — R42 DIZZINESS: Primary | ICD-10-CM

## 2022-05-11 PROCEDURE — 97112 NEUROMUSCULAR REEDUCATION: CPT | Performed by: PHYSICAL THERAPIST

## 2022-05-11 NOTE — PROGRESS NOTES
Daily Note     Today's date: 2022  Patient name: Mary Navas  : 1975  MRN: [id number]  Referring provider: Faraz Santos MD  Dx:   Encounter Diagnosis     ICD-10-CM    1  Dizziness  R42                   Subjective: More dizzy today  Objective: See treatment diary below      Assessment: Presented to session without CAM boot today  Overall appeared more unsteady with dynamic balance activities but pt may be adjusting to not having boot on  Patient would benefit from continued PT      Plan: Progress treatment as tolerated         Precautions: Falls, Mal de Embarquement  Re-eval Date:   HEP: [id number]     Date     Visit Count 6  7     FOTO        Pain In        Pain Out          Manuals                                        Neuro Re-Ed   W/cam boot LLE     Gaze Stabilization        VOR near  R/L & Up/down Standing busy, level 1 min x 2 H/V  Stand/foam  FT  Busy  1 min ea  115 horiz  120 vert Standing foam FT 1 min x 2    VOR Far  R/L & Up/down   Stand/foam  FT  Busy  1 min ea  120 horiz  120 vert     VOR cancel Standing level, busy 45" x 2  Standing level, busy 45" x 2  Horiz/vert ea Standing busy FT 1 min x 2     Habituation        Static Balance FAEC foam 30" x 3     FTEO with HT/HN foam 30" x 2     semitandem level EC 30" x 2  FAEC foam 30" x 3     FTEO with HT/HN foam 30" x 2     Tandem level EC 60" x 1 FAEC foam 30" x 3     FTEO with HT/HN foam 30" x 2     Tandem level EC 30" x 2    Diagonals   Pt perform @ home  Stand/busy  Horizontal  60"  Vertical 60"      Amb with 180* turns   2x 48'  CS 30' x 2    Eyes/Head        [facility] pick ups        Ther Ex        Neurocom        Nustep   L5 8 min  End rx session                                                     Ther Activity        Walking w/head turns/nods 20' x 4 H/V fw/bw   25' x 4 each w/head turns and nods  CS/CGA 30' x 4    Walking with turns                        Gait Training                        Modalities

## 2022-05-18 ENCOUNTER — OFFICE VISIT (OUTPATIENT)
Dept: PHYSICAL THERAPY | Facility: CLINIC | Age: 47
End: 2022-05-18
Payer: COMMERCIAL

## 2022-05-18 DIAGNOSIS — R42 DIZZINESS: Primary | ICD-10-CM

## 2022-05-18 PROCEDURE — 97112 NEUROMUSCULAR REEDUCATION: CPT | Performed by: PHYSICAL THERAPIST

## 2022-05-18 PROCEDURE — 97110 THERAPEUTIC EXERCISES: CPT | Performed by: PHYSICAL THERAPIST

## 2022-05-18 NOTE — PROGRESS NOTES
Daily Note     Today's date: 2022  Patient name: Adry Sanders  : 1975  MRN: [id number]  Referring provider: Benita Marshall MD  Dx:   Encounter Diagnosis     ICD-10-CM    1  Dizziness  R42                   Subjective: Has sensation where she feels like she is falling even though her balance is good  It is hard for her to focus at work  Will be seeing the ENT on Friday  Objective: See treatment diary below      Assessment: Held foam with VOR exercises due to irriating foot  Pt was unsteady and challenged with FTEC on foam with HT/HN so verbally cued to perform with slower head movements  Deferred TM due to exacerbation of MDS last time she was on TM  Patient would benefit from continued PT      Plan: Progress treatment as tolerated  Trial optokinetic stripes in future session        Precautions: Falls, Mal de Embarquement  Re-eval Date:   HEP: [id number]     Date    Visit Count 6  7     FOTO        Pain In        Pain Out          Manuals                                        Neuro Re-Ed   W/cam boot LLE     Gaze Stabilization        VOR near  R/L & Up/down Standing busy, level 1 min x 2 H/V  Stand/foam  FT  Busy  1 min ea  115 horiz  120 vert Standing foam FT 1 min x 2 1 min x 2 standing busy    VOR Far  R/L & Up/down   Stand/foam  FT  Busy  1 min ea  120 horiz  120 vert     VOR cancel Standing level, busy 45" x 2  Standing level, busy 45" x 2  Horiz/vert ea Standing busy FT 1 min x 2  Standing busy 1 min x 2    Habituation        Static Balance FAEC foam 30" x 3     FTEO with HT/HN foam 30" x 2     semitandem level EC 30" x 2  FAEC foam 30" x 3     FTEO with HT/HN foam 30" x 2     Tandem level EC 60" x 1 FAEC foam 30" x 3     FTEO with HT/HN foam 30" x 2     Tandem level EC 30" x 2 FTEC foam 30 x 2     semitandem level 30" x 2    Diagonals   Pt perform @ home  Stand/busy  Horizontal  60"  Vertical 60"      Amb with 180* turns   2x 48'  CS 30' x 2    Tandem gait 6 laps    Amb with HT/HN     30' x 4   Ther Ex        Neurocom        Nustep   L5 8 min  End rx session  L3, 8 min at end of session                                                    Ther Activity        Walking w/head turns/nods 20' x 4 H/V fw/bw   25' x 4 each w/head turns and nods  CS/CGA 30' x 4    Walking with turns                        Gait Training                        Modalities

## 2022-05-20 ENCOUNTER — OFFICE VISIT (OUTPATIENT)
Dept: PHYSICAL THERAPY | Facility: CLINIC | Age: 47
End: 2022-05-20
Payer: COMMERCIAL

## 2022-05-20 DIAGNOSIS — R42 DIZZINESS: Primary | ICD-10-CM

## 2022-05-20 PROCEDURE — 97110 THERAPEUTIC EXERCISES: CPT

## 2022-05-20 PROCEDURE — 97112 NEUROMUSCULAR REEDUCATION: CPT

## 2022-05-20 NOTE — PROGRESS NOTES
Daily Note     Today's date: 2022  Patient name: Savannah Leahy  : 1975  MRN: [id number]  Referring provider: Giovani Otto MD  Dx:   Encounter Diagnosis     ICD-10-CM    1  Dizziness  R42                   Subjective: I am my normal today, no new co's re: sx's of dizziness  I have been out of walking boot for over a week now, adjusting to being back into a shoe again  I have to put a call out for ortho for R shoulder, NT UE related to my last fall      Objective: See treatment diary below      Assessment: Tolerated treatment well  Pt demonstrated > postural sway with FT-HT/HN activity  Demonstrates NBOS with ambul, tandem>occ scissor gait with ambul with HT/HN  Cues > LEONEL   Patient would benefit from continued PT      Plan: Continue per plan of care        Precautions: Falls, Mal de Embarquement  Re-eval Date:   HEP: [id number]     Date        Visit Count 11       FOTO        Pain In        Pain Out          Manuals                                        Neuro Re-Ed        Gaze Stabilization        VOR near  R/L & Up/down 1 min x 2 standing busy   foam       VOR Far  R/L & Up/down        VOR cancel Standing busy  Foam   1 min x 2 ea  Horiz/vert       Habituation        Static Balance //  FTEC foam 30 sec x 2     semitandem level 30" x 2 ea dir       Diagonals        Amb with 180* turns        Tandem gait 6 laps   Firm in //  fwd       FT HT/HN 30" x 2 ea  Foam/stand  //       Ther Ex        Neurocom        Nustep L3, 8 min at end of session                                                        Ther Activity        Walking w/head turns/nods 30' x 2 ea  CS       Walking with turns                        Gait Training                        Modalities

## 2022-05-25 ENCOUNTER — OFFICE VISIT (OUTPATIENT)
Dept: PHYSICAL THERAPY | Facility: CLINIC | Age: 47
End: 2022-05-25
Payer: COMMERCIAL

## 2022-05-25 DIAGNOSIS — R42 DIZZINESS: Primary | ICD-10-CM

## 2022-05-25 PROCEDURE — 97112 NEUROMUSCULAR REEDUCATION: CPT | Performed by: PHYSICAL THERAPIST

## 2022-05-25 NOTE — PROGRESS NOTES
Daily Note     Today's date: 2022  Patient name: Yamile Espino  : 1975  MRN: [id number]  Referring provider: Case Pugh MD  Dx:   Encounter Diagnosis     ICD-10-CM    1  Dizziness  R42                   Subjective: Feels pretty good today  Objective: See treatment diary below      Assessment: Demo narrow gait and occasional LE adduction during VOR cx with gait  Trialed optokinetic disco ball and pt tolerated well  Patient would benefit from continued PT      Plan: Progress treatment as tolerated         Precautions: Falls, Mal de Embarquement  Re-eval Date:   HEP: [id number]     Date       Visit Count 11 12      FOTO        Pain In        Pain Out          Manuals                                        Neuro Re-Ed        Gaze Stabilization        VOR near  R/L & Up/down 1 min x 2 standing busy   foam With gait  plain 45" x 2       VOR Far  R/L & Up/down        VOR cancel Standing busy  Foam   1 min x 2 ea  Horiz/vert With gait busy 30'  x3       Habituation        Static Balance //  FTEC foam 30 sec x 2     semitandem level 30" x 2 ea dir FTEC foam with HT/HN 30" x 2    Tandem EC level 30" x 2      Disco ball  Standing in dark room 3 min       EC amb  30' x 4      Tandem gait 6 laps   Firm in //  fwd 6 laps level fw       FT HT/HN 30" x 2 ea  Foam/stand  //       Ther Ex        Neurocom        Nustep L3, 8 min at end of session                                                        Ther Activity        Walking w/head turns/nods 30' x 2 ea  CS       Walking with turns                        Gait Training                        Modalities

## 2022-06-01 ENCOUNTER — APPOINTMENT (OUTPATIENT)
Dept: PHYSICAL THERAPY | Facility: CLINIC | Age: 47
End: 2022-06-01
Payer: COMMERCIAL

## 2022-06-08 ENCOUNTER — OFFICE VISIT (OUTPATIENT)
Dept: PHYSICAL THERAPY | Facility: CLINIC | Age: 47
End: 2022-06-08
Payer: COMMERCIAL

## 2022-06-08 DIAGNOSIS — R42 DIZZINESS: Primary | ICD-10-CM

## 2022-06-08 PROCEDURE — 97112 NEUROMUSCULAR REEDUCATION: CPT | Performed by: PHYSICAL THERAPIST

## 2022-06-08 NOTE — PROGRESS NOTES
Progress Note     Today's date: 2022  Patient name: Gladys Fisher  : 1975  MRN: [id number]  Referring provider: Jigna Mckinney MD  Dx:   Encounter Diagnosis     ICD-10-CM    1  Dizziness  R42                   Subjective: Was doing okay  Has good days and bad days  Dependent on weather  Don't see a huge improvement - not sure if she sees any improvement  Will be going to [facility]  and will see audiology  Has no control of it  Takes migraines medicine and sometimes Ativan  Feels like this is her new "normal " has gone back to work  Went back to surgery and made it through despite sxs  No pain  Objective: See treatment diary below      Assessment: Progress note completed this session  Pt had subjective report of no significant change in sxs and feels like PT has not been helping  Her main dizziness trigger seems to be weather related and barometrics per pt  She did demo improved balance per MCTSIB  She will be d/c from skilled PT at this time  Goals  1  Pt will demonstrate independence w/HEP for maintenance  - MET  2  Pt will demonstrate >50% reduction in dizziness/lightheaded symptoms  - NOT MET   3  Pt will establish neurocom baseline test  - NOT MET  4  Pt will report ability to ambulate throughout day without being limited by lightheadedness/dizziness   - NOT MET      New LT week s  1  [address] < 31 to demo decreased dizziness - NOT MET  2   MCTSIB = 30 sec all conditions to demo improved balance - MET        Plan - Discharge  Plan of Care beginning date: 2022  Plan of Care expiration date: 2022  Treatment plan discussed with: patient         PHYSICAL FINDINGS:  Oculomotor ROM : WNL  Resting nystagmus: No  Gaze holding nystagmus No    Smooth pursuit Normal, dizziness      Vertical Saccades: hypo, dizziness  Horizontal Saccades: hypo, dizziness  Convergence: Normal     Head thrust (room light): Normal     MCTSIB  30 eyes open firm surface   30 eyes closed form surface  30 eyes open foam surface  30, prev 20 eyes closed foam surface     FGA: 5/4: 27/30     Positional testing: Right Left   [place] [facility] WNL WNL   Roll test: WNL WNL       DHI: 6/8: 40       Precautions: Falls, Mal de Embarquement  Re-eval Date: 6/4  HEP: [id number]     Date 5/20 5/25 6/8     Visit Count 11 12 13     FOTO        Pain In        Pain Out          Manuals                                        Neuro Re-Ed        Gaze Stabilization        VOR near  R/L & Up/down 1 min x 2 standing busy   foam With gait  plain 45" x 2       VOR Far  R/L & Up/down        VOR cancel Standing busy  Foam   1 min x 2 ea  Horiz/vert With gait busy 30'  x3       Habituation        Static Balance //  FTEC foam 30 sec x 2     semitandem level 30" x 2 ea dir FTEC foam with HT/HN 30" x 2    Tandem EC level 30" x 2      Disco ball  Standing in dark room 3 min       EC amb  30' x 4      Tandem gait 6 laps   Firm in //  fwd 6 laps level fw       FT HT/HN 30" x 2 ea  Foam/stand  //       Ther Ex        Neurocom        Nustep L3, 8 min at end of session                                                        Ther Activity        Walking w/head turns/nods 30' x 2 ea  CS       Walking with turns                        Gait Training                        Modalities

## 2022-06-10 ENCOUNTER — APPOINTMENT (OUTPATIENT)
Dept: PHYSICAL THERAPY | Facility: CLINIC | Age: 47
End: 2022-06-10
Payer: COMMERCIAL

## 2022-08-03 ENCOUNTER — EVALUATION (OUTPATIENT)
Dept: PHYSICAL THERAPY | Facility: CLINIC | Age: 47
End: 2022-08-03
Payer: COMMERCIAL

## 2022-08-03 DIAGNOSIS — M25.511 CHRONIC RIGHT SHOULDER PAIN: ICD-10-CM

## 2022-08-03 DIAGNOSIS — M75.41 SHOULDER IMPINGEMENT, RIGHT: Primary | ICD-10-CM

## 2022-08-03 DIAGNOSIS — M67.911 DISORDER OF RIGHT ROTATOR CUFF: ICD-10-CM

## 2022-08-03 DIAGNOSIS — G89.29 CHRONIC RIGHT SHOULDER PAIN: ICD-10-CM

## 2022-08-03 PROCEDURE — 97112 NEUROMUSCULAR REEDUCATION: CPT | Performed by: PHYSICAL THERAPIST

## 2022-08-03 PROCEDURE — 97161 PT EVAL LOW COMPLEX 20 MIN: CPT | Performed by: PHYSICAL THERAPIST

## 2022-08-03 NOTE — LETTER
2022    MD SHAWN [address] [facility]    Patient: Meeta Steiner   YOB: 1975   Date of Visit: 8/3/2022     Encounter Diagnosis     ICD-10-CM    1  Shoulder impingement, right  M75 41    2  Disorder of right rotator cuff  M67 911    3  Chronic right shoulder pain  M25 511     G89 29        Dear Dr Blandon Abt:    Thank you for your recent referral of Meeta Steiner  Please review the attached evaluation summary from Di's recent visit  Please verify that you agree with the plan of care by signing the attached order  If you have any questions or concerns, please do not hesitate to call  I sincerely appreciate the opportunity to share in the care of one of your patients and hope to have another opportunity to work with you in the near future  Sincerely,    Sunita Myers, PT      Referring Provider:      I certify that I have read the below Plan of Care and certify the need for these services furnished under this plan of treatment while under my care  MD SHAWN [address] [place] [address]  Via Fax: [phone]          PT Evaluation     Today's date: 8/3/2022  Patient name: Meeta Steiner  : 1975  MRN: [id number]  Referring provider: Jonathon Giraldo MD  Dx:   Encounter Diagnosis     ICD-10-CM    1  Chronic right shoulder pain  M25 511     G89 29                   Assessment  Assessment details: Meeta Steiner is a 52 y o  female who presents with pain and decreased strength  Due to these impairments, patient has difficulty performing ADL's, recreational activities, work-related activities, reaching  Patient's clinical presentation is consistent with their referring diagnosis of Chronic right shoulder pain  (primary encounter diagnosis)    Patient has been educated in home exercise program and plan of care   Patient would benefit from skilled physical therapy services to address their aforementioned functional limitations and progress towards prior level of function and independence with home exercise program      Impairments: abnormal muscle firing, abnormal or restricted ROM, activity intolerance, impaired physical strength, lacks appropriate home exercise program, pain with function and poor posture   Understanding of Dx/Px/POC: good   Prognosis: good    Goals  Short Term Goals:    1  Initiate and advance HEP  2  MMT R [facility] IR 4/5  3  (-) Ulnar nerve tension test on R    Long Term Goals:    1  Indep with HEP  2  Push-ups  3  [address]  Patient would benefit from: skilled PT  Planned modality interventions: cryotherapy, electrical stimulation/Russian stimulation and thermotherapy: hydrocollator packs  Planned therapy interventions: joint mobilization, manual therapy, patient education, postural training, activity modification, abdominal trunk stabilization, body mechanics training, flexibility, functional ROM exercises, graded exercise, home exercise program, neuromuscular re-education, strengthening, stretching, therapeutic activities, therapeutic exercise, motor coordination training, muscle pump exercises, gait training, balance/weight bearing training, ADL training and Carrasco taping  Frequency: 2x week  Duration in weeks: 8  Treatment plan discussed with: patient        Subjective Evaluation    History of Present Illness  Mechanism of injury: Pt reports balance issues secondary to COVID  Pt fell down stairs in 2022  Pt with right shoulder and rib pain since    Quality of life: good    Pain  Current pain ratin  At best pain ratin  At worst pain ratin  Relieving factors: rest  Progression: no change      Diagnostic Tests  X-ray: normal  Treatments  Previous treatment: medication  Current treatment: physical therapy  Patient Goals  Patient goals for therapy: increased motion, increased strength, independence with ADLs/IADLs, return to sport/leisure activities and return to work          Objective     Static Posture     Head  Forward  Shoulders  Rounded  Active Range of Motion   Cervical/Thoracic Spine       Cervical    Flexion: 62 degrees   Extension: 46 degrees      Left lateral flexion: 44 degrees      Right lateral flexion: 44 degrees      Left rotation: 70 degrees  Right rotation: 72 degrees         Left Shoulder   Normal active range of motion    Right Shoulder   Normal active range of motion    Left Elbow   Normal active range of motion    Right Elbow   Normal active range of motion    Strength/Myotome Testing   Cervical Spine   Neck extension: 4+  Neck flexion: 4+    Left   Neck lateral flexion (C3): 4+    Right   Neck lateral flexion (C3): 4+    Left Shoulder     Planes of Motion   Flexion: 4+   Abduction: 4+   External rotation at 0°: 4-   Internal rotation at 0°: 4     Right Shoulder     Planes of Motion   Flexion: 4+   Abduction: 4+   External rotation at 0°: 4-   Internal rotation at 0°: 3+     Left Elbow   Flexion: 4+  Extension: 4+    Right Elbow   Flexion: 4+  Extension: 4+    Tests     Left Shoulder   Positive ULTT1  Negative drop arm, empty can, Hawkin's, horn blower, lift-off, Speed's, ULTT3 and ULTT4  Right Shoulder   Positive empty can, Hawkin's, lift-off, Speed's, ULTT1, ULTT3 and ULTT4  Negative drop arm and horn blower                Precautions: Meniere's       Manuals 8/3        MFR to Pec M/M, Scalenes  Add       GH Joint Mobs                           Neuro Re-Ed         Cervical Retraction          Scapular Retraction 30x        Pec Stretch 30" hold         Pec Minor Stretch 30" hold 3x        R Unlar Nerve Glides  ADD       B Median Nerve Sliders         R Radial Nerve Sliders         Shoulder Ext         Rows L1         Rows L2         Rows L4         Ball on Wall                           Ther Ex         UBE  ADD       UT Stretch 30" hold         Levator Stretch 30" hold         Scalene Stretch 30" hold  ADD       ER Pulse Ther Activity                           Gait Training                           Modalities

## 2022-08-03 NOTE — PROGRESS NOTES
PT Evaluation     Today's date: 8/3/2022  Patient name: Poli Burden  : 1975  MRN: [id number]  Referring provider: Amy Medina MD  Dx:   Encounter Diagnosis     ICD-10-CM    1  Chronic right shoulder pain  M25 511     G89 29                   Assessment  Assessment details: Poli Burden is a 52 y o  female who presents with pain and decreased strength  Due to these impairments, patient has difficulty performing ADL's, recreational activities, work-related activities, reaching  Patient's clinical presentation is consistent with their referring diagnosis of Chronic right shoulder pain  (primary encounter diagnosis)    Patient has been educated in home exercise program and plan of care  Patient would benefit from skilled physical therapy services to address their aforementioned functional limitations and progress towards prior level of function and independence with home exercise program      Impairments: abnormal muscle firing, abnormal or restricted ROM, activity intolerance, impaired physical strength, lacks appropriate home exercise program, pain with function and poor posture   Understanding of Dx/Px/POC: good   Prognosis: good    Goals  Short Term Goals:    1  Initiate and advance HEP  2  [facility]  3  (-) Ulnar nerve tension test on R    Long Term Goals:    1  Indep with HEP  2  Push-ups  3  [address]  Patient would benefit from: skilled PT  Planned modality interventions: cryotherapy, electrical stimulation/Russian stimulation and thermotherapy: hydrocollator packs  Planned therapy interventions: joint mobilization, manual therapy, patient education, postural training, activity modification, abdominal trunk stabilization, body mechanics training, flexibility, functional ROM exercises, graded exercise, home exercise program, neuromuscular re-education, strengthening, stretching, therapeutic activities, therapeutic exercise, motor coordination training, muscle pump exercises, gait training, balance/weight bearing training, ADL training and Carrasco taping  Frequency: 2x week  Duration in weeks: 8  Treatment plan discussed with: patient        Subjective Evaluation    History of Present Illness  Mechanism of injury: Pt reports balance issues secondary to COVID  Pt fell down stairs in 2022  Pt with right shoulder and rib pain since  Quality of life: good    Pain  Current pain ratin  At best pain ratin  At worst pain ratin  Relieving factors: rest  Progression: no change      Diagnostic Tests  X-ray: normal  Treatments  Previous treatment: medication  Current treatment: physical therapy  Patient Goals  Patient goals for therapy: increased motion, increased strength, independence with ADLs/IADLs, return to sport/leisure activities and return to work          Objective     Static Posture     Head  Forward  Shoulders  Rounded  Active Range of Motion   Cervical/Thoracic Spine       Cervical    Flexion: 62 degrees   Extension: 46 degrees      Left lateral flexion: 44 degrees      Right lateral flexion: 44 degrees      Left rotation: 70 degrees  Right rotation: 72 degrees         Left Shoulder   Normal active range of motion    Right Shoulder   Normal active range of motion    Left Elbow   Normal active range of motion    Right Elbow   Normal active range of motion    Strength/Myotome Testing   Cervical Spine   Neck extension: 4+  Neck flexion: 4+    Left   Neck lateral flexion (C3): 4+    Right   Neck lateral flexion (C3): 4+    Left Shoulder     Planes of Motion   Flexion: 4+   Abduction: 4+   External rotation at 0°: 4-   Internal rotation at 0°: 4     Right Shoulder     Planes of Motion   Flexion: 4+   Abduction: 4+   External rotation at 0°: 4-   Internal rotation at 0°: 3+     Left Elbow   Flexion: 4+  Extension: 4+    Right Elbow   Flexion: 4+  Extension: 4+    Tests     Left Shoulder   Positive ULTT1     Negative drop arm, empty can, Hawkin's, horn blower, lift-off, Speed's, ULTT3 and ULTT4  Right Shoulder   Positive empty can, Hawkin's, lift-off, Speed's, ULTT1, ULTT3 and ULTT4  Negative drop arm and horn blower                Precautions: Meniere's       Manuals 8/3        MFR to Pec M/M, Scalenes  Add       GH Joint Mobs                           Neuro Re-Ed         Cervical Retraction          Scapular Retraction 30x        Pec Stretch 30" hold         Pec Minor Stretch 30" hold 3x        R Unlar Nerve Glides  ADD       B Median Nerve Sliders         R Radial Nerve Sliders         Shoulder Ext         Rows L1         Rows L2         Rows L4         Ball on Wall                           Ther Ex         UBE  ADD       UT Stretch 30" hold         Levator Stretch 30" hold         Scalene Stretch 30" hold  ADD       ER Pulse                                    Ther Activity                           Gait Training                           Modalities

## 2022-08-04 ENCOUNTER — HOSPITAL ENCOUNTER (OUTPATIENT)
Dept: MAMMOGRAPHY | Facility: HOSPITAL | Age: 47
Discharge: HOME | End: 2022-08-04
Payer: COMMERCIAL

## 2022-08-04 ENCOUNTER — HOSPITAL ENCOUNTER (OUTPATIENT)
Dept: ULTRASOUND IMAGING | Facility: HOSPITAL | Age: 47
Discharge: HOME | End: 2022-08-04
Payer: COMMERCIAL

## 2022-08-04 ENCOUNTER — APPOINTMENT (RX ONLY)
Dept: URBAN - NONMETROPOLITAN AREA CLINIC 4 | Facility: CLINIC | Age: 47
Setting detail: DERMATOLOGY
End: 2022-08-04

## 2022-08-04 VITALS — WEIGHT: 175.93 LBS | BODY MASS INDEX: 31.17 KG/M2 | HEIGHT: 63 IN

## 2022-08-04 DIAGNOSIS — D22 MELANOCYTIC NEVI: ICD-10-CM

## 2022-08-04 DIAGNOSIS — N64.4 NIPPLE PAIN: ICD-10-CM

## 2022-08-04 DIAGNOSIS — Z12.31 ENCOUNTER FOR SCREENING MAMMOGRAM FOR MALIGNANT NEOPLASM OF BREAST: ICD-10-CM

## 2022-08-04 DIAGNOSIS — N64.4 BREAST PAIN, LEFT: ICD-10-CM

## 2022-08-04 DIAGNOSIS — L57.8 OTHER SKIN CHANGES DUE TO CHRONIC EXPOSURE TO NONIONIZING RADIATION: ICD-10-CM | Status: STABLE

## 2022-08-04 DIAGNOSIS — L82.1 OTHER SEBORRHEIC KERATOSIS: ICD-10-CM

## 2022-08-04 DIAGNOSIS — L81.4 OTHER MELANIN HYPERPIGMENTATION: ICD-10-CM

## 2022-08-04 DIAGNOSIS — L73.2 HIDRADENITIS SUPPURATIVA: ICD-10-CM | Status: INADEQUATELY CONTROLLED

## 2022-08-04 PROBLEM — D22.72 MELANOCYTIC NEVI OF LEFT LOWER LIMB, INCLUDING HIP: Status: ACTIVE | Noted: 2022-08-04

## 2022-08-04 PROBLEM — D22.62 MELANOCYTIC NEVI OF LEFT UPPER LIMB, INCLUDING SHOULDER: Status: ACTIVE | Noted: 2022-08-04

## 2022-08-04 PROBLEM — D22.71 MELANOCYTIC NEVI OF RIGHT LOWER LIMB, INCLUDING HIP: Status: ACTIVE | Noted: 2022-08-04

## 2022-08-04 PROBLEM — D22.61 MELANOCYTIC NEVI OF RIGHT UPPER LIMB, INCLUDING SHOULDER: Status: ACTIVE | Noted: 2022-08-04

## 2022-08-04 PROCEDURE — G0279 TOMOSYNTHESIS, MAMMO: HCPCS

## 2022-08-04 PROCEDURE — 99203 OFFICE O/P NEW LOW 30 MIN: CPT

## 2022-08-04 PROCEDURE — 76642 ULTRASOUND BREAST LIMITED: CPT

## 2022-08-04 PROCEDURE — ? FULL BODY SKIN EXAM

## 2022-08-04 PROCEDURE — ? TREATMENT REGIMEN

## 2022-08-04 PROCEDURE — 77066 DX MAMMO INCL CAD BI: CPT

## 2022-08-04 PROCEDURE — ? COUNSELING

## 2022-08-04 ASSESSMENT — LOCATION SIMPLE DESCRIPTION DERM
LOCATION SIMPLE: LEFT TEMPLE
LOCATION SIMPLE: CHEST
LOCATION SIMPLE: RIGHT FOREHEAD
LOCATION SIMPLE: RIGHT UPPER ARM
LOCATION SIMPLE: RIGHT THIGH
LOCATION SIMPLE: LEFT SHOULDER
LOCATION SIMPLE: RIGHT FOREARM
LOCATION SIMPLE: LEFT FOREHEAD
LOCATION SIMPLE: RIGHT UPPER BACK
LOCATION SIMPLE: LEFT UPPER BACK
LOCATION SIMPLE: LEFT UPPER ARM
LOCATION SIMPLE: RIGHT CHEEK
LOCATION SIMPLE: LEFT FOREARM
LOCATION SIMPLE: LEFT CHEEK
LOCATION SIMPLE: RIGHT SHOULDER
LOCATION SIMPLE: LEFT THIGH

## 2022-08-04 ASSESSMENT — LOCATION DETAILED DESCRIPTION DERM
LOCATION DETAILED: LEFT ANTERIOR PROXIMAL THIGH
LOCATION DETAILED: LEFT CENTRAL MALAR CHEEK
LOCATION DETAILED: RIGHT ANTERIOR MEDIAL PROXIMAL THIGH
LOCATION DETAILED: RIGHT ANTERIOR PROXIMAL THIGH
LOCATION DETAILED: LEFT SUPERIOR UPPER BACK
LOCATION DETAILED: RIGHT PROXIMAL DORSAL FOREARM
LOCATION DETAILED: LEFT MEDIAL SUPERIOR CHEST
LOCATION DETAILED: RIGHT ANTERIOR PROXIMAL UPPER ARM
LOCATION DETAILED: RIGHT INFERIOR CENTRAL MALAR CHEEK
LOCATION DETAILED: LEFT FOREHEAD
LOCATION DETAILED: LEFT PROXIMAL DORSAL FOREARM
LOCATION DETAILED: RIGHT FOREHEAD
LOCATION DETAILED: RIGHT SUPERIOR UPPER BACK
LOCATION DETAILED: LEFT MID TEMPLE
LOCATION DETAILED: LEFT ANTERIOR LATERAL PROXIMAL UPPER ARM
LOCATION DETAILED: RIGHT ANTERIOR SHOULDER
LOCATION DETAILED: RIGHT INFERIOR UPPER BACK
LOCATION DETAILED: LEFT ANTERIOR SHOULDER

## 2022-08-04 ASSESSMENT — LOCATION ZONE DERM
LOCATION ZONE: FACE
LOCATION ZONE: LEG
LOCATION ZONE: ARM
LOCATION ZONE: TRUNK

## 2022-08-04 NOTE — PROCEDURE: TREATMENT REGIMEN
Detail Level: Zone
Plan: Patient will be back in 1 week to have shave removal for SK rubbing on clothing.

## 2022-08-08 ENCOUNTER — OFFICE VISIT (OUTPATIENT)
Dept: PHYSICAL THERAPY | Facility: CLINIC | Age: 47
End: 2022-08-08
Payer: COMMERCIAL

## 2022-08-08 DIAGNOSIS — G89.29 CHRONIC RIGHT SHOULDER PAIN: ICD-10-CM

## 2022-08-08 DIAGNOSIS — M75.41 SHOULDER IMPINGEMENT, RIGHT: Primary | ICD-10-CM

## 2022-08-08 DIAGNOSIS — M25.511 CHRONIC RIGHT SHOULDER PAIN: ICD-10-CM

## 2022-08-08 DIAGNOSIS — M67.911 DISORDER OF RIGHT ROTATOR CUFF: ICD-10-CM

## 2022-08-08 PROCEDURE — 97112 NEUROMUSCULAR REEDUCATION: CPT

## 2022-08-08 PROCEDURE — 97140 MANUAL THERAPY 1/> REGIONS: CPT

## 2022-08-08 NOTE — PROGRESS NOTES
Daily Note     Today's date: 2022  Patient name: Kenneth Granados  : 1975  MRN: [id number]  Referring provider: Jon Toussaint MD  Dx:   Encounter Diagnosis     ICD-10-CM    1  Shoulder impingement, right  M75 41    2  Disorder of right rotator cuff  M67 911    3  Chronic right shoulder pain  M25 511     G89 29        Start Time: 1557  Stop Time: 1643  Total time in clinic (min): 46 minutes    Subjective: Patient notes her rib feels out  Shoulder feels a little better  Objective: See treatment diary below    Assessment: Tolerated treatment well  Patient demonstrated fatigue post treatment, exhibited good technique with therapeutic exercises and would benefit from continued PT  Cueing required to decrease UT compensation during scapular strengthening  Plan: Continue per plan of care        Precautions: Vandana's    Manuals 8/3 8/8       MFR to Pec M/M, Yonas  JM       GH Joint Mobs                           Neuro Re-Ed         Cervical Retraction          Scapular Retraction 30x 30x       Pec Stretch 30" hold         Pec Minor Stretch 30" hold 3x 3x       R Unlar Nerve Glides  10x       B Median Nerve Sliders         R Radial Nerve Sliders         Shoulder Ext  RTB  20x       Rows L1  RTB  20x       Rows L2         Rows L4         Ball on Wall                           Ther Ex         UBE  4'/4'       UT Stretch 30" hold         Levator Stretch 30" hold         Scalene Stretch 30" hold  3x       ER Pulse                                    Ther Activity                           Gait Training                           Modalities

## 2022-08-10 ENCOUNTER — OFFICE VISIT (OUTPATIENT)
Dept: PHYSICAL THERAPY | Facility: CLINIC | Age: 47
End: 2022-08-10
Payer: COMMERCIAL

## 2022-08-10 DIAGNOSIS — M67.911 DISORDER OF RIGHT ROTATOR CUFF: ICD-10-CM

## 2022-08-10 DIAGNOSIS — G89.29 CHRONIC RIGHT SHOULDER PAIN: ICD-10-CM

## 2022-08-10 DIAGNOSIS — M75.41 SHOULDER IMPINGEMENT, RIGHT: Primary | ICD-10-CM

## 2022-08-10 DIAGNOSIS — M25.511 CHRONIC RIGHT SHOULDER PAIN: ICD-10-CM

## 2022-08-10 PROCEDURE — 97112 NEUROMUSCULAR REEDUCATION: CPT

## 2022-08-10 PROCEDURE — 97110 THERAPEUTIC EXERCISES: CPT

## 2022-08-10 PROCEDURE — 97140 MANUAL THERAPY 1/> REGIONS: CPT

## 2022-08-10 NOTE — PROGRESS NOTES
Daily Note     Today's date: 8/10/2022  Patient name: Maddie Tovar  : 1975  MRN: [id number]  Referring provider: Rama Rolle MD  Dx:   Encounter Diagnosis     ICD-10-CM    1  Shoulder impingement, right  M75 41    2  Disorder of right rotator cuff  M67 911    3  Chronic right shoulder pain  M25 511     G89 29                   Subjective: patient denied any pain       Objective: See treatment diary below      Assessment: Patient able to progress with repetitions and resistance with various exercises  Good tolerance with progression with minimal cues required  Patient continues to present with deficits with strength and ROM requiring continued skilled physical therapy      Plan: Continue per plan of care  Progress treatment as tolerated         Precautions: Vandana's    Manuals 8/3 8/8 8/10      MFR to Pec M/M, Yonas   10'      [facility] Joint Mobs                           Neuro Re-Ed         Cervical Retraction          Scapular Retraction 30x 30x 30x      Pec Stretch 30" hold   3x      Pec Minor Stretch 30" hold 3x 3x 3x      R Unlar Nerve Glides  10x 10x      B Median Nerve Sliders         R Radial Nerve Sliders         Shoulder Ext  RTB  20x Red  20x      Rows L1  RTB  20x Red   20x       Rows L2   Red   20x      Rows L4         Ball on Wall                           Ther Ex         UBE  4'/4' 120 rpm  8' alt       UT Stretch 30" hold   3x      Levator Stretch 30" hold   3x      Scalene Stretch 30" hold  3x 3x      ER Pulse                                    Ther Activity                           Gait Training                           Modalities

## 2022-08-11 ENCOUNTER — RX ONLY (OUTPATIENT)
Age: 47
Setting detail: RX ONLY
End: 2022-08-11

## 2022-08-11 ENCOUNTER — APPOINTMENT (RX ONLY)
Dept: URBAN - NONMETROPOLITAN AREA CLINIC 4 | Facility: CLINIC | Age: 47
Setting detail: DERMATOLOGY
End: 2022-08-11

## 2022-08-11 DIAGNOSIS — L82.0 INFLAMED SEBORRHEIC KERATOSIS: ICD-10-CM

## 2022-08-11 PROBLEM — D48.5 NEOPLASM OF UNCERTAIN BEHAVIOR OF SKIN: Status: ACTIVE | Noted: 2022-08-11

## 2022-08-11 PROCEDURE — 11301 SHAVE SKIN LESION 0.6-1.0 CM: CPT

## 2022-08-11 PROCEDURE — ? TREATMENT REGIMEN

## 2022-08-11 PROCEDURE — ? SHAVE REMOVAL

## 2022-08-11 RX ORDER — CLINDAMYCIN PHOSPHATE 10 MG/ML
1% SOLUTION TOPICAL BID
Qty: 60 | Refills: 2 | Status: ERX | COMMUNITY
Start: 2022-08-11

## 2022-08-11 ASSESSMENT — LOCATION SIMPLE DESCRIPTION DERM: LOCATION SIMPLE: RIGHT UPPER BACK

## 2022-08-11 ASSESSMENT — LOCATION DETAILED DESCRIPTION DERM: LOCATION DETAILED: RIGHT MID-UPPER BACK

## 2022-08-11 ASSESSMENT — LOCATION ZONE DERM: LOCATION ZONE: TRUNK

## 2022-08-11 NOTE — PROCEDURE: SHAVE REMOVAL
Medical Necessity Information: It is in your best interest to select a reason for this procedure from the list below. All of these items fulfill various CMS LCD requirements except the new and changing color options.
Medical Necessity Clause: This procedure was medically necessary because the lesion that was treated was:
Lab: 6
Lab Facility: 3
Detail Level: Detailed
Was A Bandage Applied: Yes
Size Of Lesion In Cm (Required): 0.7
X Size Of Lesion In Cm (Optional): 0
Biopsy Method: Dermablade
Anesthesia Type: 1% lidocaine without epinephrine
Anesthesia Volume In Cc: 1
Hemostasis: Electrocautery
Wound Care: Petrolatum
Path Notes (To The Dermatopathologist): Please check margins
Render Path Notes In Note?: No
Consent was obtained from the patient. The risks and benefits to therapy were discussed in detail. Specifically, the risks of infection, scarring, bleeding, prolonged wound healing, incomplete removal, allergy to anesthesia, nerve injury and recurrence were addressed. Prior to the procedure, the treatment site was clearly identified and confirmed by the patient. All components of Universal Protocol/PAUSE Rule completed.
Post-Care Instructions: I reviewed with the patient in detail post-care instructions. Patient is to keep the biopsy site dry overnight, and then apply bacitracin twice daily until healed. Patient may apply hydrogen peroxide soaks to remove any crusting.
Notification Instructions: Patient will be notified of pathology results. However, patient instructed to call the office if not contacted within 2 weeks.
Billing Type: Third-Party Bill

## 2022-08-15 ENCOUNTER — OFFICE VISIT (OUTPATIENT)
Dept: PHYSICAL THERAPY | Facility: CLINIC | Age: 47
End: 2022-08-15
Payer: COMMERCIAL

## 2022-08-15 DIAGNOSIS — M25.511 CHRONIC RIGHT SHOULDER PAIN: ICD-10-CM

## 2022-08-15 DIAGNOSIS — G89.29 CHRONIC RIGHT SHOULDER PAIN: ICD-10-CM

## 2022-08-15 DIAGNOSIS — M75.41 SHOULDER IMPINGEMENT, RIGHT: ICD-10-CM

## 2022-08-15 DIAGNOSIS — M67.911 DISORDER OF RIGHT ROTATOR CUFF: Primary | ICD-10-CM

## 2022-08-15 PROCEDURE — 97140 MANUAL THERAPY 1/> REGIONS: CPT

## 2022-08-15 PROCEDURE — 97112 NEUROMUSCULAR REEDUCATION: CPT

## 2022-08-15 PROCEDURE — 97110 THERAPEUTIC EXERCISES: CPT

## 2022-08-15 NOTE — PROGRESS NOTES
Daily Note     Today's date: 8/15/2022  Patient name: Pema Eli  : 1975  MRN: [id number]  Referring provider: Jonathon Koenig MD  Dx:   Encounter Diagnosis     ICD-10-CM    1  Disorder of right rotator cuff  M67 911    2  Shoulder impingement, right  M75 41    3  Chronic right shoulder pain  M25 511     G89 29                   Subjective: Currently complaining of some rib pain  Shoulder pain is positional        Objective: See treatment diary below      Assessment: Consistent cueing for improved scap depression to prevent UT compensation with TB routine  Patient continues to present with deficits with strength and ROM requiring continued skilled physical therapy      Plan: Continue per plan of care  Progress treatment as tolerated         Precautions: Vandana's    Manuals 8/3 8/8 8/10 8/15     MFR to Pec M/M, Yonas   10' CM  10'     [facility]                           Neuro Re-Ed    8/15     Cervical Retraction          Scapular Retraction 30x 30x 30x 30x     Pec Stretch 30" hold   3x 3x     Pec Minor Stretch 30" hold 3x 3x 3x 3x     R Unlar Nerve Glides  10x 10x 10x     B Median Nerve Sliders         R Radial Nerve Sliders         Shoulder Ext  RTB  20x Red  20x Red  20x     Rows L1  RTB  20x Red   20x  Red  20x     Rows L2   Red   20x Red  20x     Rows L4         Ball on Wall                           Ther Ex    8/15     UBE  4'/4' 120 rpm  8' alt  120 rpm  8' alt     UT Stretch 30" hold   3x 3x     Levator Stretch 30" hold   3x 3x     Scalene Stretch 30" hold  3x 3x 3x     ER Pulse                                    Ther Activity                           Gait Training                           Modalities

## 2022-08-17 ENCOUNTER — OFFICE VISIT (OUTPATIENT)
Dept: PHYSICAL THERAPY | Facility: CLINIC | Age: 47
End: 2022-08-17
Payer: COMMERCIAL

## 2022-08-17 DIAGNOSIS — G89.29 CHRONIC RIGHT SHOULDER PAIN: ICD-10-CM

## 2022-08-17 DIAGNOSIS — M67.911 DISORDER OF RIGHT ROTATOR CUFF: Primary | ICD-10-CM

## 2022-08-17 DIAGNOSIS — M25.511 CHRONIC RIGHT SHOULDER PAIN: ICD-10-CM

## 2022-08-17 DIAGNOSIS — M75.41 SHOULDER IMPINGEMENT, RIGHT: ICD-10-CM

## 2022-08-17 PROCEDURE — 97140 MANUAL THERAPY 1/> REGIONS: CPT

## 2022-08-17 PROCEDURE — 97112 NEUROMUSCULAR REEDUCATION: CPT

## 2022-08-17 PROCEDURE — 97110 THERAPEUTIC EXERCISES: CPT

## 2022-08-17 NOTE — PROGRESS NOTES
Daily Note     Today's date: 2022  Patient name: Davide Garland  : 1975  MRN: [id number]  Referring provider: Niko Blackwell MD  Dx:   Encounter Diagnosis     ICD-10-CM    1  Disorder of right rotator cuff  M67 911    2  Shoulder impingement, right  M75 41    3  Chronic right shoulder pain  M25 511     G89 29                   Subjective: Pt reports her shoulder feels good "depending on the position it's in"  Objective: See treatment diary below      Assessment: Pt tolerated treatment well  Improved carryover of scapular depression from last session, leading to decreased B/L UT recruitment during scapular strengthening this visit  Pt ttp along pec minor following manuals last visit  Would benefit from continued skilled PT to improve scapular strength and stability deficits and return to PLOF  Plan: Continue per plan of care  Progress treatment as tolerated         Precautions: Vandana's    Manuals 8/3 8/8 8/10 8/15 8/17    MFR to Pec M/MYonas   10' CM  10' 10'    [address] [facility] Joint Mobs                           Neuro Re-Ed    8/15     Cervical Retraction          Scapular Retraction 30x 30x 30x 30x 30x    Pec Stretch 30" hold   3x 3x 3x    Pec Minor Stretch 30" hold 3x 3x 3x 3x 3x    R Unlar Nerve Glides  10x 10x 10x 10x    B Median Nerve Sliders         R Radial Nerve Sliders         Shoulder Ext  RTB  20x Red  20x Red  20x Red  20x    Rows L1  RTB  20x Red   20x  Red  20x Red 20x    Rows L2   Red   20x Red  20x Red 20x    Rows L4         Ball on Wall                           Ther Ex    8/15     UBE  4'/4' 120 rpm  8' alt  120 rpm  8' alt 120 rpm  8' alt    UT Stretch 30" hold   3x 3x 3x    Levator Stretch 30" hold   3x 3x 3x    Scalene Stretch 30" hold  3x 3x 3x 3x    ER Pulse                                    Ther Activity                           Gait Training                           Modalities

## 2022-08-22 ENCOUNTER — OFFICE VISIT (OUTPATIENT)
Dept: PHYSICAL THERAPY | Facility: CLINIC | Age: 47
End: 2022-08-22
Payer: COMMERCIAL

## 2022-08-22 DIAGNOSIS — G89.29 CHRONIC RIGHT SHOULDER PAIN: ICD-10-CM

## 2022-08-22 DIAGNOSIS — M25.511 CHRONIC RIGHT SHOULDER PAIN: ICD-10-CM

## 2022-08-22 DIAGNOSIS — M75.41 SHOULDER IMPINGEMENT, RIGHT: ICD-10-CM

## 2022-08-22 DIAGNOSIS — M67.911 DISORDER OF RIGHT ROTATOR CUFF: Primary | ICD-10-CM

## 2022-08-22 PROCEDURE — 97112 NEUROMUSCULAR REEDUCATION: CPT

## 2022-08-22 PROCEDURE — 97110 THERAPEUTIC EXERCISES: CPT

## 2022-08-22 PROCEDURE — 97140 MANUAL THERAPY 1/> REGIONS: CPT

## 2022-08-22 NOTE — PROGRESS NOTES
Daily Note     Today's date: 2022  Patient name: Loli More  : 1975  MRN: [id number]  Referring provider: Kingsley Schirmer, MD  Dx:   Encounter Diagnosis     ICD-10-CM    1  Disorder of right rotator cuff  M67 911    2  Shoulder impingement, right  M75 41    3  Chronic right shoulder pain  M25 511     G89 29                   Subjective: Pt reports her shoulder is doing well today denying any pain or tightness upon presentation  Objective: See treatment diary below      Assessment: Pt tolerated treatment well  Pt continues to demonstrate good carryover with current exercises  Minimal discomfort was present with L2 rows in the shoulder joint but able to complete  Would benefit from continued skilled PT to improve scapular strength and stability deficits and return to PLOF  Plan: Continue per plan of care  Progress treatment as tolerated         Precautions: Vandana's    Manuals 8/3 8/8 8/10 8/15 8/17 8/22   MFR to Pec M/M, Yonas   10' CM  10' 10' 10'   [address] [facility] Joint Mobs                           Neuro Re-Ed    8/15     Cervical Retraction          Scapular Retraction 30x 30x 30x 30x 30x 30x   Pec Stretch 30" hold   3x 3x 3x 3x   Pec Minor Stretch 30" hold 3x 3x 3x 3x 3x 3x   R Unlar Nerve Glides  10x 10x 10x 10x 10x   B Median Nerve Sliders         R Radial Nerve Sliders         Shoulder Ext  RTB  20x Red  20x Red  20x Red  20x Red 30x   Rows L1  RTB  20x Red   20x  Red  20x Red 20x Red 30x   Rows L2   Red   20x Red  20x Red 20x Red 30x   Rows L4         Ball on Wall                           Ther Ex    8/15     UBE  4'/4' 120 rpm  8' alt  120 rpm  8' alt 120 rpm  8' alt 120 rpm  8' alt   UT Stretch 30" hold   3x 3x 3x 3x   Levator Stretch 30" hold   3x 3x 3x 3x   Scalene Stretch 30" hold  3x 3x 3x 3x 3x   ER Pulse                                    Ther Activity                           Gait Training                           Modalities

## 2022-08-25 ENCOUNTER — APPOINTMENT (OUTPATIENT)
Dept: PHYSICAL THERAPY | Facility: CLINIC | Age: 47
End: 2022-08-25
Payer: COMMERCIAL

## 2022-08-25 NOTE — PROGRESS NOTES
Daily Note     Today's date: 2022  Patient name: Erin Katz  : 1975  MRN: [id number]  Referring provider: Rubin Benitez MD  Dx:   Encounter Diagnosis     ICD-10-CM    1  Disorder of right rotator cuff  M67 911    2  Shoulder impingement, right  M75 41    3  Chronic right shoulder pain  M25 511     G89 29                   Subjective:       Objective: See treatment diary below      Assessment: Tolerated treatment {Tolerated treatment :[id number]}  Patient {assessment:[id number]}      Plan: Continue per plan of care        Precautions: Meniere's    Manuals 8/25 8/8 8/10 8/15 8/17 8/22   MFR to Pec M/M, Yonas 10' JM 10' CM  10' 10' 10'   [facility] Joint Mobs                           Neuro Re-Ed    8/15     Cervical Retraction          Scapular Retraction 30x 30x 30x 30x 30x 30x   Pec Stretch 30" hold 3x  3x 3x 3x 3x   Pec Minor Stretch 30" hold 3x 3x 3x 3x 3x 3x   R Unlar Nerve Glides 10x 10x 10x 10x 10x 10x   B Median Nerve Sliders         R Radial Nerve Sliders         Shoulder Ext Red 30x RTB  20x Red  20x Red  20x Red  20x Red 30x   Rows L1 Red 30x RTB  20x Red   20x  Red  20x Red 20x Red 30x   Rows L2 Red 30x  Red   20x Red  20x Red 20x Red 30x   Rows L4         Ball on Wall                           Ther Ex    8/15      rpm  8' Alt 4'/4' 120 rpm  8' alt  120 rpm  8' alt 120 rpm  8' alt 120 rpm  8' alt   UT Stretch 30" hold 3x  3x 3x 3x 3x   Levator Stretch 30" hold 3x  3x 3x 3x 3x   Scalene Stretch 30" hold 3x 3x 3x 3x 3x 3x   ER Pulse                                    Ther Activity                           Gait Training                           Modalities

## 2022-08-31 ENCOUNTER — EVALUATION (OUTPATIENT)
Dept: PHYSICAL THERAPY | Facility: CLINIC | Age: 47
End: 2022-08-31
Payer: COMMERCIAL

## 2022-08-31 DIAGNOSIS — G89.29 CHRONIC RIGHT SHOULDER PAIN: ICD-10-CM

## 2022-08-31 DIAGNOSIS — M67.911 DISORDER OF RIGHT ROTATOR CUFF: Primary | ICD-10-CM

## 2022-08-31 DIAGNOSIS — M25.511 CHRONIC RIGHT SHOULDER PAIN: ICD-10-CM

## 2022-08-31 DIAGNOSIS — M75.41 SHOULDER IMPINGEMENT, RIGHT: ICD-10-CM

## 2022-08-31 PROCEDURE — 97140 MANUAL THERAPY 1/> REGIONS: CPT | Performed by: PHYSICAL THERAPIST

## 2022-08-31 PROCEDURE — 97110 THERAPEUTIC EXERCISES: CPT | Performed by: PHYSICAL THERAPIST

## 2022-08-31 NOTE — LETTER
2022    MD SHAWN [address] [facility]    Patient: Carolyn Villaseñor   YOB: 1975   Date of Visit: 2022     Encounter Diagnosis     ICD-10-CM    1  Disorder of right rotator cuff  M67 911    2  Shoulder impingement, right  M75 41    3  Chronic right shoulder pain  M25 511     G89 29        Dear Dr Saleem Castilol:    Thank you for your recent referral of Carolyn Villaseñor  Please review the attached evaluation summary from Di's recent visit  Please verify that you agree with the plan of care by signing the attached order  If you have any questions or concerns, please do not hesitate to call  I sincerely appreciate the opportunity to share in the care of one of your patients and hope to have another opportunity to work with you in the near future  Sincerely,    Celina Chicas, PT      Referring Provider:      I certify that I have read the below Plan of Care and certify the need for these services furnished under this plan of treatment while under my care  MD SHAWN [address] [facility]  Via Fax: [phone]          PT Re-Evaluation     Today's date: 2022  Patient name: Carolyn Villaseñor  : 1975  MRN: [id number]  Referring provider: Lorena Zelaya MD  Dx:   Encounter Diagnosis     ICD-10-CM    1  Disorder of right rotator cuff  M67 911    2  Shoulder impingement, right  M75 41    3  Chronic right shoulder pain  M25 511     G89 29                   Assessment  Assessment details: Carolyn Villaseñor is a 52 y o  female who presents with pain and decreased strength  Due to these impairments, patient has difficulty performing ADL's, recreational activities, work-related activities, reaching  Patient demonstrates improving strength and ROM in her neck and right shoulder    Patient's clinical presentation is consistent with their referring diagnosis of Chronic right shoulder pain  (primary encounter diagnosis)    Patient has been educated in home exercise program and plan of care  Patient would benefit from skilled physical therapy services to address their aforementioned functional limitations and progress towards prior level of function and independence with home exercise program      Impairments: abnormal muscle firing, abnormal or restricted ROM, activity intolerance, impaired physical strength, lacks appropriate home exercise program, pain with function and poor posture   Understanding of Dx/Px/POC: good   Prognosis: good    Goals  Short Term Goals:    1  Initiate and advance HEP - MET  2  MMT R sholder IR 4/5 - MET  3  (-) Median nerve tension test on R    Long Term Goals:    1  Indep with HEP  2  Push-ups - Not Attempted  3  Swing OH - Not Attempted    Plan  Patient would benefit from: skilled PT  Planned modality interventions: cryotherapy, electrical stimulation/Russian stimulation and thermotherapy: hydrocollator packs  Planned therapy interventions: joint mobilization, manual therapy, patient education, postural training, activity modification, abdominal trunk stabilization, body mechanics training, flexibility, functional ROM exercises, graded exercise, home exercise program, neuromuscular re-education, strengthening, stretching, therapeutic activities, therapeutic exercise, motor coordination training, muscle pump exercises, gait training, balance/weight bearing training, ADL training and Carrasco taping  Frequency: 2x week  Duration in weeks: 8  Treatment plan discussed with: patient        Subjective Evaluation    History of Present Illness  Mechanism of injury: Pt reports balance issues secondary to COVID  Pt fell down stairs in 2022  Pt with right shoulder and rib pain since    Quality of life: good    Pain  Current pain ratin  At best pain ratin  At worst pain ratin  Quality: sharp and knife-like  Relieving factors: rest  Progression: improved      Diagnostic Tests  X-ray: normal  Treatments  Previous treatment: medication  Current treatment: physical therapy  Patient Goals  Patient goals for therapy: increased motion, increased strength, independence with ADLs/IADLs, return to sport/leisure activities and return to work          Objective     Static Posture     Head  Forward  Shoulders  Rounded  Active Range of Motion   Cervical/Thoracic Spine       Cervical    Flexion: 62 degrees   Extension: 46 degrees      Left lateral flexion: 44 degrees      Right lateral flexion: 44 degrees      Left rotation: 74 degrees  Right rotation: 75 degrees         Left Shoulder   Normal active range of motion    Right Shoulder   Normal active range of motion    Left Elbow   Normal active range of motion    Right Elbow   Normal active range of motion    Strength/Myotome Testing   Cervical Spine   Neck extension: 4+  Neck flexion: 4+    Left   Neck lateral flexion (C3): 4+    Right   Neck lateral flexion (C3): 4+    Left Shoulder     Planes of Motion   Flexion: 4+   Abduction: 4+   External rotation at 0°: 4   Internal rotation at 0°: 4     Right Shoulder     Planes of Motion   Flexion: 4+   Abduction: 4+   External rotation at 0°: 4   Internal rotation at 0°: 4     Left Elbow   Flexion: 4+  Extension: 4+    Right Elbow   Flexion: 4+  Extension: 4+    Tests     Left Shoulder   Positive ULTT1  Negative drop arm, empty can, Hawkin's, horn blower, lift-off, Speed's, ULTT3 and ULTT4  Right Shoulder   Positive empty can, Hawkin's, lift-off and ULTT1  Negative drop arm, horn blower, Speed's, ULTT3 and ULTT4                Precautions: Meniere's     Manuals 8/31 8/8 8/10 8/15 8/17 8/22   MFR to Pec M/MYonas 10' CM  10' 10' 10'   [facility] Joint Mobs                                               Neuro Re-Ed       8/15       Cervical Retraction               Scapular Retraction  30x 30x 30x 30x 30x   Pec Stretch 30" hold    3x 3x 3x 3x   Pec Minor Stretch 30" hold  3x 3x 3x 3x 3x   R Unlar Nerve Glides  10x 10x 10x 10x 10x   B Median Nerve Sliders              R Radial Nerve Sliders              Shoulder Ext  RTB  20x Red  20x Red  20x Red  20x Red 30x   Rows L1  RTB  20x Red   20x  Red  20x Red 20x Red 30x   Rows L2    Red   20x Red  20x Red 20x Red 30x   Rows L4              Ball on Wall                                            Ther Ex      8/15        Resist  8' ALT 4'/4' 120 rpm  8' alt  120 rpm  8' alt 120 rpm  8' alt 120 rpm  8' alt   UT Stretch 30" hold    3x 3x 3x 3x   Levator Stretch 30" hold    3x 3x 3x 3x   Scalene Stretch 30" hold  3x 3x 3x 3x 3x   ER Pulse                                                           Ther Activity                                             Gait Training                                               Modalities

## 2022-08-31 NOTE — PROGRESS NOTES
PT Re-Evaluation     Today's date: 2022  Patient name: Rosmery Ventura  : 1975  MRN: [id number]  Referring provider: Jon Silva MD  Dx:   Encounter Diagnosis     ICD-10-CM    1  Disorder of right rotator cuff  M67 911    2  Shoulder impingement, right  M75 41    3  Chronic right shoulder pain  M25 511     G89 29                   Assessment  Assessment details: Rosmery Ventura is a 52 y o  female who presents with pain and decreased strength  Due to these impairments, patient has difficulty performing ADL's, recreational activities, work-related activities, reaching  Patient demonstrates improving strength and ROM in her neck and right shoulder  Patient's clinical presentation is consistent with their referring diagnosis of Chronic right shoulder pain  (primary encounter diagnosis)    Patient has been educated in home exercise program and plan of care  Patient would benefit from skilled physical therapy services to address their aforementioned functional limitations and progress towards prior level of function and independence with home exercise program      Impairments: abnormal muscle firing, abnormal or restricted ROM, activity intolerance, impaired physical strength, lacks appropriate home exercise program, pain with function and poor posture   Understanding of Dx/Px/POC: good   Prognosis: good    Goals  Short Term Goals:    1  Initiate and advance HEP - MET  2  MMT R sholder IR 4/5 - MET  3  (-) Median nerve tension test on R    Long Term Goals:    1  Indep with HEP  2  Push-ups - Not Attempted  3  Swing OH - Not Attempted    Plan  Patient would benefit from: skilled PT  Planned modality interventions: cryotherapy, electrical stimulation/Russian stimulation and thermotherapy: hydrocollator packs  Planned therapy interventions: joint mobilization, manual therapy, patient education, postural training, activity modification, abdominal trunk stabilization, body mechanics training, flexibility, functional ROM exercises, graded exercise, home exercise program, neuromuscular re-education, strengthening, stretching, therapeutic activities, therapeutic exercise, motor coordination training, muscle pump exercises, gait training, balance/weight bearing training, ADL training and Carrasco taping  Frequency: 2x week  Duration in weeks: 8  Treatment plan discussed with: patient        Subjective Evaluation    History of Present Illness  Mechanism of injury: Pt reports balance issues secondary to COVID  Pt fell down stairs in 2022  Pt with right shoulder and rib pain since  Quality of life: good    Pain  Current pain ratin  At best pain ratin  At worst pain ratin  Quality: sharp and knife-like  Relieving factors: rest  Progression: improved      Diagnostic Tests  X-ray: normal  Treatments  Previous treatment: medication  Current treatment: physical therapy  Patient Goals  Patient goals for therapy: increased motion, increased strength, independence with ADLs/IADLs, return to sport/leisure activities and return to work          Objective     Static Posture     Head  Forward  Shoulders  Rounded      Active Range of Motion   Cervical/Thoracic Spine       Cervical    Flexion: 62 degrees   Extension: 46 degrees      Left lateral flexion: 44 degrees      Right lateral flexion: 44 degrees      Left rotation: 74 degrees  Right rotation: 75 degrees         Left Shoulder   Normal active range of motion    Right Shoulder   Normal active range of motion    Left Elbow   Normal active range of motion    Right Elbow   Normal active range of motion    Strength/Myotome Testing   Cervical Spine   Neck extension: 4+  Neck flexion: 4+    Left   Neck lateral flexion (C3): 4+    Right   Neck lateral flexion (C3): 4+    Left Shoulder     Planes of Motion   Flexion: 4+   Abduction: 4+   External rotation at 0°: 4   Internal rotation at 0°: 4     Right Shoulder     Planes of Motion   Flexion: 4+ Abduction: 4+   External rotation at 0°: 4   Internal rotation at 0°: 4     Left Elbow   Flexion: 4+  Extension: 4+    Right Elbow   Flexion: 4+  Extension: 4+    Tests     Left Shoulder   Positive ULTT1  Negative drop arm, empty can, Hawkin's, horn blower, lift-off, Speed's, ULTT3 and ULTT4  Right Shoulder   Positive empty can, Hawkin's, lift-off and ULTT1  Negative drop arm, horn blower, Speed's, ULTT3 and ULTT4                Precautions: Meniere's     Manuals 8/31 8/8 8/10 8/15 8/17 8/22   MFR to Pec M/M, Yonas    10' CM  10' 10' 10'   [address] [facility] Joint Mobs                                               Neuro Re-Ed       8/15       Cervical Retraction               Scapular Retraction  30x 30x 30x 30x 30x   Pec Stretch 30" hold    3x 3x 3x 3x   Pec Minor Stretch 30" hold  3x 3x 3x 3x 3x   R Unlar Nerve Glides  10x 10x 10x 10x 10x   B Median Nerve Sliders              R Radial Nerve Sliders              Shoulder Ext  RTB  20x Red  20x Red  20x Red  20x Red 30x   Rows L1  RTB  20x Red   20x  Red  20x Red 20x Red 30x   Rows L2    Red   20x Red  20x Red 20x Red 30x   Rows L4              Ball on Wall                                            Ther Ex      8/15        Resist  8' ALT 4'/4' 120 rpm  8' alt  120 rpm  8' alt 120 rpm  8' alt 120 rpm  8' alt   UT Stretch 30" hold    3x 3x 3x 3x   Levator Stretch 30" hold    3x 3x 3x 3x   Scalene Stretch 30" hold  3x 3x 3x 3x 3x   ER Pulse                                                           Ther Activity                                             Gait Training                                               Modalities

## 2022-09-06 ENCOUNTER — OFFICE VISIT (OUTPATIENT)
Dept: PHYSICAL THERAPY | Facility: CLINIC | Age: 47
End: 2022-09-06
Payer: COMMERCIAL

## 2022-09-06 DIAGNOSIS — M25.511 CHRONIC RIGHT SHOULDER PAIN: ICD-10-CM

## 2022-09-06 DIAGNOSIS — G89.29 CHRONIC RIGHT SHOULDER PAIN: ICD-10-CM

## 2022-09-06 DIAGNOSIS — M75.41 SHOULDER IMPINGEMENT, RIGHT: ICD-10-CM

## 2022-09-06 DIAGNOSIS — M67.911 DISORDER OF RIGHT ROTATOR CUFF: Primary | ICD-10-CM

## 2022-09-06 PROCEDURE — 97112 NEUROMUSCULAR REEDUCATION: CPT | Performed by: PHYSICAL THERAPIST

## 2022-09-06 PROCEDURE — 97110 THERAPEUTIC EXERCISES: CPT | Performed by: PHYSICAL THERAPIST

## 2022-09-06 PROCEDURE — 97140 MANUAL THERAPY 1/> REGIONS: CPT | Performed by: PHYSICAL THERAPIST

## 2022-09-06 NOTE — PROGRESS NOTES
Daily Note     Today's date: 2022  Patient name: Durwin Sever  : 1975  MRN: [id number]  Referring provider: Kalli Brandon MD  Dx:   Encounter Diagnosis     ICD-10-CM    1  Disorder of right rotator cuff  M67 911    2  Shoulder impingement, right  M75 41    3  Chronic right shoulder pain  M25 511     G89 29                   Subjective: Pt reports feeling good today      Objective: See treatment diary below      Assessment: Tolerated treatment well  Patient would benefit from continued PT      Plan: Progress treatment as tolerated         Precautions: Meniere's     Manuals 8/31 9/6  8/15 8/17 8/22   MFR to Pec M/M, Scalenes   10'  CM  10' 10' 10'   [facility] Joint Mobs                                         Neuro Re-Ed     8/15       Cervical Retraction             Scapular Retraction  30x  30x 30x 30x   Pec Stretch 30" hold  3x  3x 3x 3x   Pec Minor Stretch 30" hold  3x  3x 3x 3x   R Unlar Nerve Glides  10x  10x 10x 10x   B Median Nerve Sliders            R Radial Nerve Sliders            Shoulder Ext  Green 30x  Red  20x Red  20x Red 30x   Rows L1  Green 20x  Red  20x Red 20x Red 30x   Rows L2  Green 20x  Red  20x Red 20x Red 30x   Rows L4            Ball on Wall                                      Ther Ex    8/15        Resist  8'  resist  8' ALT  120 rpm  8' alt 120 rpm  8' alt 120 rpm  8' alt   UT Stretch 30" hold  3x  3x 3x 3x   Levator Stretch 30" hold  3x  3x 3x 3x   Scalene Stretch 30" hold  3x  3x 3x 3x   ER Pulse                                                   Ther Activity                                       Gait Training                                               Modalities

## 2022-09-08 ENCOUNTER — OFFICE VISIT (OUTPATIENT)
Dept: PHYSICAL THERAPY | Facility: CLINIC | Age: 47
End: 2022-09-08
Payer: COMMERCIAL

## 2022-09-08 DIAGNOSIS — M75.41 SHOULDER IMPINGEMENT, RIGHT: ICD-10-CM

## 2022-09-08 DIAGNOSIS — G89.29 CHRONIC RIGHT SHOULDER PAIN: ICD-10-CM

## 2022-09-08 DIAGNOSIS — M25.511 CHRONIC RIGHT SHOULDER PAIN: ICD-10-CM

## 2022-09-08 DIAGNOSIS — M67.911 DISORDER OF RIGHT ROTATOR CUFF: Primary | ICD-10-CM

## 2022-09-08 PROCEDURE — 97112 NEUROMUSCULAR REEDUCATION: CPT

## 2022-09-08 PROCEDURE — 97140 MANUAL THERAPY 1/> REGIONS: CPT

## 2022-09-08 PROCEDURE — 97110 THERAPEUTIC EXERCISES: CPT

## 2022-09-08 NOTE — PROGRESS NOTES
Daily Note     Today's date: 2022  Patient name: Sancho Paz  : 1975  MRN: [id number]  Referring provider: Ruby Chacon MD  Dx:   Encounter Diagnosis     ICD-10-CM    1  Disorder of right rotator cuff  M67 911    2  Shoulder impingement, right  M75 41    3  Chronic right shoulder pain  M25 511     G89 29           Subjective: Patient noted no pain currently in R shoulder  Objective: See treatment diary below      Assessment: Tolerated treatment well  Patient exhibited good technique with therapeutic exercises and would benefit from continued PT      Plan: Continue per plan of care        Precautions: Vandana's     Manuals    MFR to Pec M/M, Scalenes   10' 10'  10' 10'   [address] Joint Mobs                                      Neuro Re-Ed            Cervical Retraction            Scapular Retraction  30x 30 x   30x 30x   Pec Stretch 30" hold  3x 3x  3x 3x   Pec Minor Stretch 30" hold  3x 3x  3x 3x   R Unlar Nerve Glides  10x 10x  10x 10x   B Median Nerve Sliders           R Radial Nerve Sliders           Shoulder Ext  Green 30x GTB 30x  Red  20x Red 30x   Rows L1  Green 20x GTB 20x  Red 20x Red 30x   Rows L2  Green 20x GTB 20x   Red 20x Red 30x   Rows L4           Ball on Wall                                      Ther Ex            Resist  8'  resist  8'  resist 10' alt   120 rpm  8' alt 120 rpm  8' alt   UT Stretch 30" hold  3x 3x  3x 3x   Levator Stretch 30" hold  3x 3x  3x 3x   Scalene Stretch 30" hold  3x 3x  3x 3x   ER Pulse                                                   Ther Activity                                       Gait Training                                               Modalities

## 2022-09-12 ENCOUNTER — OFFICE VISIT (OUTPATIENT)
Dept: PHYSICAL THERAPY | Facility: CLINIC | Age: 47
End: 2022-09-12
Payer: COMMERCIAL

## 2022-09-12 DIAGNOSIS — M25.511 CHRONIC RIGHT SHOULDER PAIN: ICD-10-CM

## 2022-09-12 DIAGNOSIS — G89.29 CHRONIC RIGHT SHOULDER PAIN: ICD-10-CM

## 2022-09-12 DIAGNOSIS — M75.41 SHOULDER IMPINGEMENT, RIGHT: ICD-10-CM

## 2022-09-12 DIAGNOSIS — M67.911 DISORDER OF RIGHT ROTATOR CUFF: Primary | ICD-10-CM

## 2022-09-12 PROCEDURE — 97140 MANUAL THERAPY 1/> REGIONS: CPT | Performed by: PHYSICAL THERAPIST

## 2022-09-12 PROCEDURE — 97110 THERAPEUTIC EXERCISES: CPT | Performed by: PHYSICAL THERAPIST

## 2022-09-12 PROCEDURE — 97112 NEUROMUSCULAR REEDUCATION: CPT | Performed by: PHYSICAL THERAPIST

## 2022-09-12 NOTE — PROGRESS NOTES
Daily Note     Today's date: 2022  Patient name: Jorgito Esposito  : 1975  MRN: [id number]  Referring provider: Esthela Wall MD  Dx:   Encounter Diagnosis     ICD-10-CM    1  Disorder of right rotator cuff  M67 911    2  Shoulder impingement, right  M75 41    3  Chronic right shoulder pain  M25 511     G89 29                   Subjective: Pt reports Left sciatic pain today      Objective: See treatment diary below      Assessment: Tolerated treatment well  Patient would benefit from continued PT      Plan: Progress treatment as tolerated         Precautions: Meniere's     Manuals    MFR to Pec M/M, Scalenes   10' 10' 10'  10'   [address] Joint Mobs                                   Neuro Re-Ed           Cervical Retraction           Scapular Retraction  30x 30 x  30x  30x   Pec Stretch 30" hold  3x 3x 3x  3x   Pec Minor Stretch 30" hold  3x 3x 3x  3x   R Unlar Nerve Glides  10x 10x 10x  10x   B Median Nerve Sliders          R Radial Nerve Sliders          Shoulder Ext  Green 30x GTB 30x Green 30x  Red 30x   Rows L1  Green 20x GTB 20x Green 20x  Red 30x   Rows L2  Green 20x GTB 20x  Green 20x  Red 30x   Rows L3          Ball on Wall                                   Ther Ex           Resist  8'  resist  8'  resist 10' alt  100 resist  8' ALT  120 rpm  8' alt   UT Stretch 30" hold  3x 3x 3x  3x   Levator Stretch 30" hold  3x 3x 3x  3x   Scalene Stretch 30" hold  3x 3x 3x  3x   ER Pulse     1 5 min                                          Ther Activity                                    Gait Training                                            Modalities

## 2022-09-14 ENCOUNTER — APPOINTMENT (OUTPATIENT)
Dept: PHYSICAL THERAPY | Facility: CLINIC | Age: 47
End: 2022-09-14
Payer: COMMERCIAL

## 2022-09-19 ENCOUNTER — OFFICE VISIT (OUTPATIENT)
Dept: PHYSICAL THERAPY | Facility: CLINIC | Age: 47
End: 2022-09-19
Payer: COMMERCIAL

## 2022-09-19 DIAGNOSIS — G89.29 CHRONIC RIGHT SHOULDER PAIN: ICD-10-CM

## 2022-09-19 DIAGNOSIS — M25.511 CHRONIC RIGHT SHOULDER PAIN: ICD-10-CM

## 2022-09-19 DIAGNOSIS — M75.41 SHOULDER IMPINGEMENT, RIGHT: ICD-10-CM

## 2022-09-19 DIAGNOSIS — M67.911 DISORDER OF RIGHT ROTATOR CUFF: Primary | ICD-10-CM

## 2022-09-19 PROCEDURE — 97140 MANUAL THERAPY 1/> REGIONS: CPT

## 2022-09-19 PROCEDURE — 97110 THERAPEUTIC EXERCISES: CPT

## 2022-09-19 NOTE — PROGRESS NOTES
Daily Note     Today's date: 2022   Patient name: Alba Lloyd  : 1975  MRN: [id number]  Referring provider: Jaden Goodwin MD  Dx:   Encounter Diagnosis     ICD-10-CM    1  Disorder of right rotator cuff  M67 911    2  Shoulder impingement, right  M75 41    3  Chronic right shoulder pain  M25 511     G89 29        Start Time: 1500  Stop Time: 1600  Total time in clinic (min): 60 minutes    Subjective: Patient reports no pain in her shoulder  Objective: See treatment diary below      Assessment: Tolerated treatment well  Shayy Vegas participated in skilled PT session focused on strengthening, stretching, and ROM  Patient continues with pain in R shoulder with some exercises, but no pain at rest   Patient would continue to benefit from skilled PT interventions to address strengthening, stretching, and ROM  Patient demonstrated fatigue post treatment and exhibited good technique with therapeutic exercises      Plan: Continue per plan of care        Precautions: Vandana's     Manuals    MFR to Pec M/M, Scalenes   10' 10' 10' X 10' 10'   [facility]                                   Neuro Re-Ed           Cervical Retraction           Scapular Retraction  30x 30 x  30x X 30 30x   Pec Stretch 30" hold  3x 3x 3x X 3 3x   Pec Minor Stretch 30" hold  3x 3x 3x X 3 3x   R Unlar Nerve Glides  10x 10x 10x X 10 10x   B Median Nerve Sliders          R Radial Nerve Sliders          Shoulder Ext  Green 30x GTB 30x Green 30x Green x 30 Red 30x   Rows L1  Green 20x GTB 20x Green 20x Green x 20 Red 30x   Rows L2  Green 20x GTB 20x  Green 20x Green x 20 Red 30x   Rows L3          Ball on Wall                                   Ther Ex           Resist  8'  resist  8'  resist 10' alt  100 resist  8'  resist x 8' alt 120 rpm  8' alt   UT Stretch 30" hold  3x 3x 3x X 3 3x   Levator Stretch 30" hold  3x 3x 3x X 3 3x   Scalene Stretch 30" hold  3x 3x 3x X 3 3x ER Pulse     1 5 min 1 5 min                                         Ther Activity                                    Gait Training                                            Modalities

## 2022-09-21 ENCOUNTER — OFFICE VISIT (OUTPATIENT)
Dept: PHYSICAL THERAPY | Facility: CLINIC | Age: 47
End: 2022-09-21
Payer: COMMERCIAL

## 2022-09-21 DIAGNOSIS — M67.911 DISORDER OF RIGHT ROTATOR CUFF: Primary | ICD-10-CM

## 2022-09-21 DIAGNOSIS — M25.511 CHRONIC RIGHT SHOULDER PAIN: ICD-10-CM

## 2022-09-21 DIAGNOSIS — M75.41 SHOULDER IMPINGEMENT, RIGHT: ICD-10-CM

## 2022-09-21 DIAGNOSIS — G89.29 CHRONIC RIGHT SHOULDER PAIN: ICD-10-CM

## 2022-09-21 PROCEDURE — 97140 MANUAL THERAPY 1/> REGIONS: CPT

## 2022-09-21 PROCEDURE — 97112 NEUROMUSCULAR REEDUCATION: CPT

## 2022-09-21 NOTE — PROGRESS NOTES
Daily Note     Today's date: 2022  Patient name: Herman English  : 1975  MRN: [id number]  Referring provider: Hunter Batista MD  Dx:   Encounter Diagnosis     ICD-10-CM    1  Disorder of right rotator cuff  M67 911    2  Shoulder impingement, right  M75 41    3  Chronic right shoulder pain  M25 511     G89 29                   Subjective: no new changes since LV  Objective: See treatment diary below      Assessment: Tolerated treatment well  Good tolerance to progression with increased for posterior chain strengthening  Good noted with self stretching  Generalized muscle fatigue noted throughout session, more so with RTC pulses  Continued PT would be beneficial to improve function           Plan: Continue per plan of care         Precautions: Vandana's     Manuals    MFR to Pec M/M, Scalenes   10' 10' 10' X 10' 10'   [address] [place] Joint [facility]obs                                Neuro Re-Ed          Cervical Retraction          Scapular Retraction  30x 30 x  30x X 30 30x   Pec Stretch 30" hold  3x 3x 3x X 3 3x   Pec Minor Stretch 30" hold  3x 3x 3x X 3 3x   R Unlar Nerve Glides  10x 10x 10x X 10 x10   B Median Nerve Sliders         R Radial Nerve Sliders         Shoulder Ext  Green 30x GTB 30x Green 30x Green x 30 Green x 30   Rows L1  Green 20x GTB 20x Green 20x Green x 20 Green x 30   Rows L2  Green 20x GTB 20x  Green 20x Green x 20 Green x 30   Rows L3         Ball on Wall                                Ther Ex          Resist  8'  resist  8'  resist 10' alt  100 resist  8'  resist x 8' alt 100 resist x 8' alt   UT Stretch 30" hold  3x 3x 3x X 3 3x   Levator Stretch 30" hold  3x 3x 3x X 3 3x   Scalene Stretch 30" hold  3x 3x 3x X 3 3x   ER Pulse     1 5 min 1 5 min 1 5 min                                    Ther Activity                                 Gait Training                                         Modalities

## 2022-09-26 ENCOUNTER — OFFICE VISIT (OUTPATIENT)
Dept: PHYSICAL THERAPY | Facility: CLINIC | Age: 47
End: 2022-09-26
Payer: COMMERCIAL

## 2022-09-26 DIAGNOSIS — M75.41 SHOULDER IMPINGEMENT, RIGHT: ICD-10-CM

## 2022-09-26 DIAGNOSIS — M25.511 CHRONIC RIGHT SHOULDER PAIN: ICD-10-CM

## 2022-09-26 DIAGNOSIS — M67.911 DISORDER OF RIGHT ROTATOR CUFF: Primary | ICD-10-CM

## 2022-09-26 DIAGNOSIS — G89.29 CHRONIC RIGHT SHOULDER PAIN: ICD-10-CM

## 2022-09-26 PROCEDURE — 97112 NEUROMUSCULAR REEDUCATION: CPT

## 2022-09-26 PROCEDURE — 97110 THERAPEUTIC EXERCISES: CPT

## 2022-09-26 PROCEDURE — 97140 MANUAL THERAPY 1/> REGIONS: CPT

## 2022-09-26 NOTE — PROGRESS NOTES
Daily Note     Today's date: 2022  Patient name: Jorgito Esposito  : 1975  MRN: [id number]  Referring provider: Esthela Wall MD  Dx:   Encounter Diagnosis     ICD-10-CM    1  Disorder of right rotator cuff  M67 911    2  Shoulder impingement, right  M75 41    3  Chronic right shoulder pain  M25 511     G89 29                   Subjective: patient stated she was feeling dizzy today  Objective: See treatment diary below      Assessment: Patient able to progress with repetitions and resistance with various exercises  Good tolerance with progression with minimal cues required  Patient continues to present with deficits with strength and ROM requiring continued skilled physical therapy      Plan: Continue per plan of care  Progress treatment as tolerated         Precautions: Vandana's     Manuals    MFR to Pec M/M, Scalenes  10 min  10' 10' X 10' 10'   [facility]obs                                Neuro Re-Ed          Cervical Retraction          Scapular Retraction 30x  30 x  30x X 30 30x   Pec Stretch 30" hold 3x  3x 3x X 3 3x   Pec Minor Stretch 30" hold 3x  3x 3x X 3 3x   R Unlar Nerve Glides 10x  10x 10x X 10 x10   B Median Nerve Sliders         R Radial Nerve Sliders         Shoulder Ext Blue   15x  GTB 30x Green 30x Green x 30 Green x 30   Rows L1 Blue   15x  GTB 20x Green 20x Green x 20 Green x 30   Rows L2 Blue   15x  GTB 20x  Green 20x Green x 20 Green x 30   Rows L3         Ball on Wall 10x                               Ther Ex          Resist  8' ALT  120 resist 10' alt  100 resist  8'  resist x 8' alt 100 resist x 8' alt   UT Stretch 30" hold 3x  3x 3x X 3 3x   Levator Stretch 30" hold 3x  3x 3x X 3 3x   Scalene Stretch 30" hold 3x  3x 3x X 3 3x   ER Pulse 1 5 min     1 5 min 1 5 min 1 5 min                                    Ther Activity                                 Gait Training                                      Modalities

## 2022-09-28 ENCOUNTER — OFFICE VISIT (OUTPATIENT)
Dept: PHYSICAL THERAPY | Facility: CLINIC | Age: 47
End: 2022-09-28
Payer: COMMERCIAL

## 2022-09-28 DIAGNOSIS — M67.911 DISORDER OF RIGHT ROTATOR CUFF: Primary | ICD-10-CM

## 2022-09-28 DIAGNOSIS — M25.511 CHRONIC RIGHT SHOULDER PAIN: ICD-10-CM

## 2022-09-28 DIAGNOSIS — G89.29 CHRONIC RIGHT SHOULDER PAIN: ICD-10-CM

## 2022-09-28 DIAGNOSIS — M75.41 SHOULDER IMPINGEMENT, RIGHT: ICD-10-CM

## 2022-09-28 PROCEDURE — 97112 NEUROMUSCULAR REEDUCATION: CPT

## 2022-09-28 PROCEDURE — 97140 MANUAL THERAPY 1/> REGIONS: CPT

## 2022-09-28 PROCEDURE — 97110 THERAPEUTIC EXERCISES: CPT

## 2022-09-28 NOTE — PROGRESS NOTES
Daily Note     Today's date: 2022  Patient name: Maddie Tovar  : 1975  MRN: [id number]  Referring provider: Rama Rolle MD  Dx:   Encounter Diagnosis     ICD-10-CM    1  Disorder of right rotator cuff  M67 911    2  Shoulder impingement, right  M75 41    3  Chronic right shoulder pain  M25 511     G89 29                   Subjective: patient c/o dizziness but not pain       Objective: See treatment diary below      Assessment: Patient progressing well towards goals  Good execution of exercise with program  Slight discomfort with figure 8 exercise  30x Patient demonstrated the appropriate amount of fatigue for program   Will continue to monitor pain levels and progress as able      Plan: Continue per plan of care  Progress treatment as tolerated         Precautions: Vandana's     Manuals    MFR to Pec M/M, Scalenes  10 min 10 min  10' X 10' 10'   [address] Joint Mobs                                Neuro Re-Ed          Cervical Retraction          Scapular Retraction 30x 30x  30x X 30 30x   Pec Stretch 30" hold 3x 3x  3x X 3 3x   Pec Minor Stretch 30" hold 3x 3x  3x X 3 3x   R Unlar Nerve Glides 10x missed  10x X 10 x10   B Median Nerve Sliders         R Radial Nerve Sliders         Shoulder Ext Blue   15x Blue   20x  Green 30x Green x 30 Green x 30   Rows L1 Blue   15x Blue   20x   Green 20x Green x 20 Green x 30   Rows L2 Blue   15x Blue   20x  Green 20x Green x 20 Green x 30   Rows L3         Ball on Wall 10x 10x         figure 8's  2 min                   Ther Ex          Resist  8' ALT 90 resist  8' alt   100 resist  8'  resist x 8' alt 100 resist x 8' alt   UT Stretch 30" hold 3x 3x  3x X 3 3x   Levator Stretch 30" hold 3x 3x  3x X 3 3x   Scalene Stretch 30" hold 3x 3x  3x X 3 3x   ER Pulse 1 5 min  1 5 min    1 5 min 1 5 min 1 5 min                                    Ther Activity                                 Gait Training                               Modalities

## 2022-10-03 ENCOUNTER — OFFICE VISIT (OUTPATIENT)
Dept: PHYSICAL THERAPY | Facility: CLINIC | Age: 47
End: 2022-10-03
Payer: COMMERCIAL

## 2022-10-03 DIAGNOSIS — M75.41 SHOULDER IMPINGEMENT, RIGHT: ICD-10-CM

## 2022-10-03 DIAGNOSIS — G89.29 CHRONIC RIGHT SHOULDER PAIN: ICD-10-CM

## 2022-10-03 DIAGNOSIS — M25.511 CHRONIC RIGHT SHOULDER PAIN: ICD-10-CM

## 2022-10-03 DIAGNOSIS — M67.911 DISORDER OF RIGHT ROTATOR CUFF: Primary | ICD-10-CM

## 2022-10-03 PROCEDURE — 97112 NEUROMUSCULAR REEDUCATION: CPT

## 2022-10-03 PROCEDURE — 97140 MANUAL THERAPY 1/> REGIONS: CPT

## 2022-10-03 PROCEDURE — 97110 THERAPEUTIC EXERCISES: CPT

## 2022-10-03 NOTE — PROGRESS NOTES
Daily Note     Today's date: 10/3/2022  Patient name: Charlene Hitchcock  : 1975  MRN: [id number]  Referring provider: Yulissa Adams MD  Dx:   Encounter Diagnosis     ICD-10-CM    1  Disorder of right rotator cuff  M67 911    2  Shoulder impingement, right  M75 41    3  Chronic right shoulder pain  M25 511     G89 29                   Subjective: patient stated she has been suffering from dizziness the past two week and it is getting worse  Patient stated feels nauseous today from it  Objective: See treatment diary below      Assessment: No progression with program due to aforementioned subjective comments  Patient able tolerate program well despite dizziness  Will continue to monitor pain, endurance and dizziness and progress as able      Plan: Continue per plan of care  Progress treatment as tolerated         Precautions: Vandana's     Manuals 9/26 9/28 10/3  9/19 9/21   MFR to Pec M/M, Scalenes  10 min 10 min 10 min   X 10' 10'   [facility]obs                                Neuro Re-Ed          Cervical Retraction          Scapular Retraction 30x 30x 30x  X 30 30x   Pec Stretch 30" hold 3x 3x 3x  X 3 3x   Pec Minor Stretch 30" hold 3x 3x 3x  X 3 3x   R Unlar Nerve Glides 10x missed 10x  X 10 x10   B Median Nerve Sliders         R Radial Nerve Sliders         Shoulder Ext Blue   15x Blue   20x Blue   20x  Green x 30 Green x 30   Rows L1 Blue   15x Blue   20x  Blue   20x  Green x 20 Green x 30   Rows L2 Blue   15x Blue   20x Blue   20x   Green x 20 Green x 30   Rows L3         Ball on Wall 10x 10x 10x       figure 8's  2 min 2 min                Ther Ex          Resist  8' ALT 90 resist  8' alt  100 resist  8 min alt   100 resist x 8' alt 100 resist x 8' alt   UT Stretch 30" hold 3x 3x 3x  X 3 3x   Levator Stretch 30" hold 3x 3x 3x  X 3 3x   Scalene Stretch 30" hold 3x 3x 3x  X 3 3x   ER Pulse 1 5 min  1 5 min  1 5 min   1 5 min 1 5 min                                 Ther Activity                      Gait Training                                Modalities

## 2022-10-05 ENCOUNTER — EVALUATION (OUTPATIENT)
Dept: PHYSICAL THERAPY | Facility: CLINIC | Age: 47
End: 2022-10-05
Payer: COMMERCIAL

## 2022-10-05 DIAGNOSIS — M75.41 SHOULDER IMPINGEMENT, RIGHT: ICD-10-CM

## 2022-10-05 DIAGNOSIS — M67.911 DISORDER OF RIGHT ROTATOR CUFF: Primary | ICD-10-CM

## 2022-10-05 DIAGNOSIS — G89.29 CHRONIC RIGHT SHOULDER PAIN: ICD-10-CM

## 2022-10-05 DIAGNOSIS — M25.511 CHRONIC RIGHT SHOULDER PAIN: ICD-10-CM

## 2022-10-05 PROCEDURE — 97110 THERAPEUTIC EXERCISES: CPT | Performed by: PHYSICAL THERAPIST

## 2022-10-05 PROCEDURE — 97140 MANUAL THERAPY 1/> REGIONS: CPT | Performed by: PHYSICAL THERAPIST

## 2022-10-05 NOTE — LETTER
2022    Radha Jaramillo MD  [facility] [address] [facility]    Patient: Nasra Vargas   YOB: 1975   Date of Visit: 10/5/2022     Encounter Diagnosis     ICD-10-CM    1  Disorder of right rotator cuff  M67 911    2  Shoulder impingement, right  M75 41    3  Chronic right shoulder pain  M25 511     G89 29        Dear Dr Raquel Fortune:    Thank you for your recent referral of Nasra Vargas  Please review the attached evaluation summary from Di's recent visit  Please verify that you agree with the plan of care by signing the attached order  If you have any questions or concerns, please do not hesitate to call  I sincerely appreciate the opportunity to share in the care of one of your patients and hope to have another opportunity to work with you in the near future  Sincerely,    Albania Kennedy, PT      Referring Provider:      I certify that I have read the below Plan of Care and certify the need for these services furnished under this plan of treatment while under my care  Radha Jaramillo MD  [facility] [address]  Via Fax: [phone]          PT Re-Evaluation     Today's date: 10/5/2022  Patient name: Nasra Vargas  : 1975  MRN: [id number]  Referring provider: Erica Brar MD  Dx:   Encounter Diagnosis     ICD-10-CM    1  Disorder of right rotator cuff  M67 911    2  Shoulder impingement, right  M75 41    3  Chronic right shoulder pain  M25 511     G89 29                   Assessment  Assessment details: Nasra Vargas is a 52 y o  female who presented with pain and decreased strength  She demonstrates improved strength, ROM, and neural tension  She reports improved function and pain    She is able to be discharged to an [facility]  Impairments: abnormal muscle firing, abnormal or restricted ROM, activity intolerance, impaired physical strength, lacks appropriate home exercise program, pain with function and poor posture   Understanding of Dx/Px/POC: good   Prognosis: good    Goals  Short Term Goals:    1  Initiate and advance HEP - MET  2  MMT R sholder IR 4/5 - MET  3  (-) Median nerve tension test on R - Partially MET    Long Term Goals:    1  Indep with HEP  2  Push-ups - MET  3 Swing [place] - MET    Plan  Plan details: Discharge to an [facility]  Planned therapy interventions: home exercise program  Treatment plan discussed with: patient        Subjective Evaluation    History of Present Illness  Mechanism of injury: Pt reports balance issues secondary to COVID  Pt fell down stairs in 2022  Pt with right shoulder and rib pain since  Quality of life: excellent    Pain  Current pain ratin  At best pain ratin  At worst pain ratin  Progression: improved      Diagnostic Tests  X-ray: normal  Treatments  Previous treatment: medication  Current treatment: physical therapy  Patient Goals  Patient goals for therapy: increased motion, increased strength, independence with ADLs/IADLs, return to sport/leisure activities and return to work          Objective     Static Posture     Head  Forward  Shoulders  Rounded      Active Range of Motion   Cervical/Thoracic Spine       Cervical    Flexion: 62 degrees   Extension: 46 degrees      Left lateral flexion: 44 degrees      Right lateral flexion: 44 degrees      Left rotation: 82 degrees  Right rotation: 82 degrees         Left Shoulder   Normal active range of motion    Right Shoulder   Normal active range of motion    Left Elbow   Normal active range of motion    Right Elbow   Normal active range of motion    Strength/Myotome Testing   Cervical Spine   Neck extension: 4+  Neck flexion: 4+    Left   Neck lateral flexion (C3): 4+    Right   Neck lateral flexion (C3): 4+    Left Shoulder     Planes of Motion   Flexion: 4+   Abduction: 4+   External rotation at 0°: 4   Internal rotation at 0°: 4     Right Shoulder     Planes of Motion   Flexion: 4+   Abduction: 4+   External rotation at 0°: 4   Internal rotation at 0°: 4     Left Elbow   Flexion: 4+  Extension: 4+    Right Elbow   Flexion: 4+  Extension: 4+    Tests     Left Shoulder   Positive ULTT1  Negative drop arm, empty can, Hawkin's, horn blower, lift-off, Speed's, ULTT3 and ULTT4  Right Shoulder   Positive ULTT1  Negative drop arm, empty can, Hawkin's, horn blower, lift-off, Speed's, ULTT3 and ULTT4                Precautions: Meniere's     Manuals 9/26 9/28 10/3  10/5 9/19 9/21   MFR to Pec M/M, Scalenes  10 min 10 min 10 min    X 10' 10'   [address] [place] Joint Mobs                                               Neuro Re-Ed               Cervical Retraction                Scapular Retraction 30x 30x 30x   X 30 30x   Pec Stretch 30" hold 3x 3x 3x   X 3 3x   Pec Minor Stretch 30" hold 3x 3x 3x   X 3 3x   R Unlar Nerve Glides 10x missed 10x   X 10 x10   B Median Nerve Sliders               R Radial Nerve Sliders               Shoulder Ext Blue   15x Blue   20x Blue   20x   Green x 30 Green x 30   Rows L1 Blue   15x Blue   20x  Blue   20x   Green x 20 Green x 30   Rows L2 Blue   15x Blue   20x Blue   20x    Green x 20 Green x 30   Rows L3               Ball on Wall 10x 10x 10x          figure 8's   2 min 2 min                         Ther Ex                Resist  8' ALT 90 resist  8' alt  100 resist  8 min alt   90 resist  8'  resist x 8' alt 100 resist x 8' alt   UT Stretch 30" hold 3x 3x 3x   X 3 3x   Levator Stretch 30" hold 3x 3x 3x   X 3 3x   Scalene Stretch 30" hold 3x 3x 3x   X 3 3x   ER Pulse 1 5 min  1 5 min  1 5 min    1 5 min 1 5 min                                                   Ther Activity                                               Gait Training                                               Modalities

## 2022-10-05 NOTE — PROGRESS NOTES
PT Re-Evaluation     Today's date: 10/5/2022  Patient name: Davide Garland  : 1975  MRN: [id number]  Referring provider: Niko Blackwell MD  Dx:   Encounter Diagnosis     ICD-10-CM    1  Disorder of right rotator cuff  M67 911    2  Shoulder impingement, right  M75 41    3  Chronic right shoulder pain  M25 511     G89 29                   Assessment  Assessment details: Davide Garland is a 52 y o  female who presented with pain and decreased strength  She demonstrates improved strength, ROM, and neural tension  She reports improved function and pain  She is able to be discharged to an [facility]  Impairments: abnormal muscle firing, abnormal or restricted ROM, activity intolerance, impaired physical strength, lacks appropriate home exercise program, pain with function and poor posture   Understanding of Dx/Px/POC: good   Prognosis: good    Goals  Short Term Goals:    1  Initiate and advance HEP - MET  2  MMT R sholder IR 4/5 - MET  3  (-) Median nerve tension test on R - Partially MET    Long Term Goals:    1  Indep with HEP  2  Push-ups - MET  3 Swing [place] - MET    Plan  Plan details: Discharge to an [facility]  Planned therapy interventions: home exercise program  Treatment plan discussed with: patient        Subjective Evaluation    History of Present Illness  Mechanism of injury: Pt reports balance issues secondary to COVID  Pt fell down stairs in 2022  Pt with right shoulder and rib pain since  Quality of life: excellent    Pain  Current pain ratin  At best pain ratin  At worst pain ratin  Progression: improved      Diagnostic Tests  X-ray: normal  Treatments  Previous treatment: medication  Current treatment: physical therapy  Patient Goals  Patient goals for therapy: increased motion, increased strength, independence with ADLs/IADLs, return to sport/leisure activities and return to work          Objective     Static Posture     Head  Forward  Shoulders  Rounded      Active Range of Motion   Cervical/Thoracic Spine       Cervical    Flexion: 62 degrees   Extension: 46 degrees      Left lateral flexion: 44 degrees      Right lateral flexion: 44 degrees      Left rotation: 82 degrees  Right rotation: 82 degrees         Left Shoulder   Normal active range of motion    Right Shoulder   Normal active range of motion    Left Elbow   Normal active range of motion    Right Elbow   Normal active range of motion    Strength/Myotome Testing   Cervical Spine   Neck extension: 4+  Neck flexion: 4+    Left   Neck lateral flexion (C3): 4+    Right   Neck lateral flexion (C3): 4+    Left Shoulder     Planes of Motion   Flexion: 4+   Abduction: 4+   External rotation at 0°: 4   Internal rotation at 0°: 4     Right Shoulder     Planes of Motion   Flexion: 4+   Abduction: 4+   External rotation at 0°: 4   Internal rotation at 0°: 4     Left Elbow   Flexion: 4+  Extension: 4+    Right Elbow   Flexion: 4+  Extension: 4+    Tests     Left Shoulder   Positive ULTT1  Negative drop arm, empty can, Hawkin's, horn blower, lift-off, Speed's, ULTT3 and ULTT4  Right Shoulder   Positive ULTT1  Negative drop arm, empty can, Hawkin's, horn blower, lift-off, Speed's, ULTT3 and ULTT4                Precautions: Meniere's     Manuals 9/26 9/28 10/3  10/5 9/19 9/21   MFR to Pec M/MYonas  10 min 10 min 10 min    X 10' 10'   [facility] Joint Mobs                                               Neuro Re-Ed               Cervical Retraction                Scapular Retraction 30x 30x 30x   X 30 30x   Pec Stretch 30" hold 3x 3x 3x   X 3 3x   Pec Minor Stretch 30" hold 3x 3x 3x   X 3 3x   R Unlar Nerve Glides 10x missed 10x   X 10 x10   B Median Nerve Sliders               R Radial Nerve Sliders               Shoulder Ext Blue   15x Blue   20x Blue   20x   Green x 30 Green x 30   Rows L1 Blue   15x Blue   20x  Blue   20x   Green x 20 Green x 30   Rows L2 Blue   15x Blue   20x Blue   20x    Green x 20 Green x 30   Rows L3             Ball on Wall 10x 10x 10x          figure 8's   2 min 2 min                         Ther Ex                Resist  8' ALT 90 resist  8' alt  100 resist  8 min alt   90 resist  8'  resist x 8' alt 100 resist x 8' alt   UT Stretch 30" hold 3x 3x 3x   X 3 3x   Levator Stretch 30" hold 3x 3x 3x   X 3 3x   Scalene Stretch 30" hold 3x 3x 3x   X 3 3x   ER Pulse 1 5 min  1 5 min  1 5 min    1 5 min 1 5 min                                                   Ther Activity                                               Gait Training                                               Modalities

## 2022-10-13 RX ORDER — MULTIVIT-MIN/IRON FUM/FOLIC AC 7.5 MG-4
1 TABLET ORAL DAILY
COMMUNITY

## 2022-10-13 NOTE — PROGRESS NOTES
A/P    1  Annual exam    Last PAP - 10/15/21- neg neg   Next due 2026   Scheduling of pap discussed in detail      Mammogram - last 8/4/22- #2 - 7 %   Next do next year   Self breast exams discussed     Colonoscopy - 2 years ago in [facility] Dr Billie Cesar          52 y o ,No LMP recorded  C/O no gyn concerns  Past medical / social / surgical / family history reviewed and updated   Medication and allergies discussed in detail and updated     Review of Systems - History obtained from chart review and the patient  General ROS: negative  Psychological ROS: negative  Ophthalmic ROS: negative  ENT ROS: negative  Allergy and Immunology ROS: negative  Hematological and Lymphatic ROS: negative  Endocrine ROS: negative  Breast ROS: negative for breast lumps  Respiratory ROS: no cough, shortness of breath, or wheezing  Cardiovascular ROS: no chest pain or dyspnea on exertion  Gastrointestinal ROS: no abdominal pain, change in bowel habits, or black or bloody stools  Genito-Urinary ROS: no dysuria, trouble voiding, or hematuria  Musculoskeletal ROS: negative  Neurological ROS: no TIA or stroke symptoms  Dermatological ROS: negative          Physical Exam  Vitals reviewed  Constitutional:       Appearance: She is well-developed  Neck:      Thyroid: No thyromegaly  Cardiovascular:      Rate and Rhythm: Normal rate  Heart sounds: Normal heart sounds  Pulmonary:      Effort: Pulmonary effort is normal  No accessory muscle usage or respiratory distress  Chest:      Chest wall: No tenderness  Breasts: Breasts are symmetrical       Right: No inverted nipple, mass, tenderness or supraclavicular adenopathy  Left: No inverted nipple, mass, tenderness or supraclavicular adenopathy  Abdominal:      General: There is no distension  Palpations: Abdomen is soft  Tenderness: There is no abdominal tenderness  There is no guarding or rebound  Genitourinary:     Exam position: Supine        Labia: Right: No rash, tenderness, lesion or injury  Left: No rash, tenderness, lesion or injury  Vagina: Normal  No foreign body  No vaginal discharge or bleeding  Cervix: No cervical motion tenderness or discharge  Uterus: Not enlarged and not fixed  Adnexa:         Right: No mass, tenderness or fullness  Left: No mass, tenderness or fullness  Rectum: No external hemorrhoid  Musculoskeletal:      Cervical back: Normal range of motion  Lymphadenopathy:      Cervical: No cervical adenopathy  Upper Body:      Right upper body: No supraclavicular adenopathy  Left upper body: No supraclavicular adenopathy  Neurological:      Mental Status: She is alert and oriented to person, place, and time  Psychiatric:         Speech: Speech normal          Behavior: Behavior normal          Thought Content:  Thought content normal          Judgment: Judgment normal

## 2022-10-17 ENCOUNTER — ANNUAL EXAM (OUTPATIENT)
Dept: OBGYN CLINIC | Facility: CLINIC | Age: 47
End: 2022-10-17
Payer: COMMERCIAL

## 2022-10-17 ENCOUNTER — APPOINTMENT (OUTPATIENT)
Dept: PHYSICAL THERAPY | Facility: CLINIC | Age: 47
End: 2022-10-17

## 2022-10-17 VITALS
SYSTOLIC BLOOD PRESSURE: 114 MMHG | DIASTOLIC BLOOD PRESSURE: 70 MMHG | WEIGHT: 174 LBS | BODY MASS INDEX: 30.83 KG/M2 | HEIGHT: 63 IN

## 2022-10-17 DIAGNOSIS — Z01.419 ENCOUNTER FOR WELL WOMAN EXAM WITH ROUTINE GYNECOLOGICAL EXAM: Primary | ICD-10-CM

## 2022-10-17 DIAGNOSIS — Z30.41 ENCOUNTER FOR SURVEILLANCE OF CONTRACEPTIVE PILLS: ICD-10-CM

## 2022-10-17 PROCEDURE — 99396 PREV VISIT EST AGE 40-64: CPT | Performed by: OBSTETRICS & GYNECOLOGY

## 2022-10-17 PROCEDURE — G0476 HPV COMBO ASSAY CA SCREEN: HCPCS | Performed by: OBSTETRICS & GYNECOLOGY

## 2022-10-17 PROCEDURE — G0145 SCR C/V CYTO,THINLAYER,RESCR: HCPCS | Performed by: OBSTETRICS & GYNECOLOGY

## 2022-10-17 RX ORDER — NORETHINDRONE ACETATE AND ETHINYL ESTRADIOL 1; 20 MG/1; UG/1
TABLET ORAL
Qty: 90 TABLET | Refills: 3 | Status: SHIPPED | OUTPATIENT
Start: 2022-10-17

## 2022-10-17 RX ORDER — LANOLIN ALCOHOL/MO/W.PET/CERES
400 CREAM (GRAM) TOPICAL DAILY
COMMUNITY

## 2022-10-17 RX ORDER — ERENUMAB-AOOE 70 MG/ML
70 INJECTION SUBCUTANEOUS
COMMUNITY
Start: 2022-10-05

## 2022-10-17 RX ORDER — CYCLOBENZAPRINE HCL 10 MG
1 TABLET ORAL 3 TIMES DAILY PRN
COMMUNITY
Start: 2022-04-21

## 2022-10-19 ENCOUNTER — TELEPHONE (OUTPATIENT)
Dept: ADMINISTRATIVE | Facility: OTHER | Age: 47
End: 2022-10-19

## 2022-10-19 NOTE — LETTER
Procedure Request Form: Colonoscopy      Date Requested: 10/19/22  Patient: Theo Hooker  Patient : 1975   Referring Provider: Zaida Stoddard MD        Date of Procedure ______________________________       The above patient has informed us that they have completed their   most recent Colonoscopy at your facility  Please complete   this form and attach all corresponding procedure reports/results  Comments _appx 2yrs ago _________________________________________________________  ____________________________________________________________________  ____________________________________________________________________  ____________________________________________________________________    Facility Completing Procedure _________________________________________    Form Completed By (print name) _______________________________________      Signature __________________________________________________________      These reports are needed for  compliance  Please fax this completed form and a copy of the procedure report to our office located at [place] [address] as soon as possible       FAX to [phone] hemalatha Lerner: Phone [phone]    We thank you for your assistance in treating our mutual patient

## 2022-10-19 NOTE — TELEPHONE ENCOUNTER
----- Message from Angela Tyler MD sent at 10/18/2022 12:39 PM EDT -----  Pt had a colonscopy with Dr Jen Miguel   In [place] about 2 years ago

## 2022-10-19 NOTE — TELEPHONE ENCOUNTER
Upon review of the In Basket request and the patient's chart, initial outreach has been made via fax, please see Contacts section for details        Att x1 colo     Thank you  Jigar Ivan

## 2022-10-20 ENCOUNTER — APPOINTMENT (OUTPATIENT)
Dept: PHYSICAL THERAPY | Facility: CLINIC | Age: 47
End: 2022-10-20

## 2022-10-23 LAB
LAB AP GYN PRIMARY INTERPRETATION: NORMAL
Lab: NORMAL
PATH INTERP SPEC-IMP: NORMAL

## 2022-10-26 NOTE — TELEPHONE ENCOUNTER
Upon review of the In Basket request we were able to locate, review, and update the patient chart as requested for CRC: Colonoscopy  Any additional questions or concerns should be emailed to the Practice Liaisons via the appropriate education email address, please do not reply via In Basket      Thank you  Melissa Carbajal

## 2022-12-09 ENCOUNTER — HOSPITAL ENCOUNTER (OUTPATIENT)
Dept: CT IMAGING | Facility: HOSPITAL | Age: 47
Discharge: HOME | End: 2022-12-09

## 2022-12-09 DIAGNOSIS — J84.10 PULMONARY GRANULOMA (HCC): ICD-10-CM

## 2023-06-08 ENCOUNTER — APPOINTMENT (RX ONLY)
Dept: URBAN - NONMETROPOLITAN AREA CLINIC 4 | Facility: CLINIC | Age: 48
Setting detail: DERMATOLOGY
End: 2023-06-08

## 2023-06-08 DIAGNOSIS — D22 MELANOCYTIC NEVI: ICD-10-CM

## 2023-06-08 DIAGNOSIS — D18.0 HEMANGIOMA: ICD-10-CM

## 2023-06-08 DIAGNOSIS — L20.89 OTHER ATOPIC DERMATITIS: ICD-10-CM | Status: INADEQUATELY CONTROLLED

## 2023-06-08 DIAGNOSIS — L57.8 OTHER SKIN CHANGES DUE TO CHRONIC EXPOSURE TO NONIONIZING RADIATION: ICD-10-CM | Status: STABLE

## 2023-06-08 PROBLEM — L20.84 INTRINSIC (ALLERGIC) ECZEMA: Status: ACTIVE | Noted: 2023-06-08

## 2023-06-08 PROBLEM — D22.61 MELANOCYTIC NEVI OF RIGHT UPPER LIMB, INCLUDING SHOULDER: Status: ACTIVE | Noted: 2023-06-08

## 2023-06-08 PROBLEM — D18.01 HEMANGIOMA OF SKIN AND SUBCUTANEOUS TISSUE: Status: ACTIVE | Noted: 2023-06-08

## 2023-06-08 PROCEDURE — ? COUNSELING

## 2023-06-08 PROCEDURE — ? PRESCRIPTION

## 2023-06-08 PROCEDURE — 99213 OFFICE O/P EST LOW 20 MIN: CPT

## 2023-06-08 PROCEDURE — ? TREATMENT REGIMEN

## 2023-06-08 PROCEDURE — ? SUNSCREEN RECOMMENDATIONS

## 2023-06-08 PROCEDURE — ? FULL BODY SKIN EXAM

## 2023-06-08 RX ORDER — TRIAMCINOLONE ACETONIDE 1 MG/G
0.1% CREAM TOPICAL BID
Qty: 453.6 | Refills: 1 | Status: ERX | COMMUNITY
Start: 2023-06-08

## 2023-06-08 RX ADMIN — TRIAMCINOLONE ACETONIDE 0.1%: 1 CREAM TOPICAL at 00:00

## 2023-06-08 ASSESSMENT — LOCATION SIMPLE DESCRIPTION DERM
LOCATION SIMPLE: INFERIOR FOREHEAD
LOCATION SIMPLE: ABDOMEN
LOCATION SIMPLE: RIGHT UPPER BACK
LOCATION SIMPLE: RIGHT UPPER ARM
LOCATION SIMPLE: UPPER BACK

## 2023-06-08 ASSESSMENT — LOCATION DETAILED DESCRIPTION DERM
LOCATION DETAILED: RIGHT ANTERIOR PROXIMAL UPPER ARM
LOCATION DETAILED: SUPERIOR THORACIC SPINE
LOCATION DETAILED: RIGHT MID-UPPER BACK
LOCATION DETAILED: LEFT LATERAL ABDOMEN
LOCATION DETAILED: INFERIOR MID FOREHEAD

## 2023-06-08 ASSESSMENT — LOCATION ZONE DERM
LOCATION ZONE: ARM
LOCATION ZONE: FACE
LOCATION ZONE: TRUNK

## 2023-06-08 ASSESSMENT — SEVERITY ASSESSMENT 2020: SEVERITY 2020: MILD

## 2023-07-07 DIAGNOSIS — Z12.31 BREAST CANCER SCREENING BY MAMMOGRAM: Primary | ICD-10-CM

## 2023-07-08 DIAGNOSIS — Z30.41 ENCOUNTER FOR SURVEILLANCE OF CONTRACEPTIVE PILLS: ICD-10-CM

## 2023-07-13 RX ORDER — NORETHINDRONE ACETATE AND ETHINYL ESTRADIOL 1; 20 MG/1; UG/1
TABLET ORAL
Qty: 84 TABLET | Refills: 3 | Status: SHIPPED | OUTPATIENT
Start: 2023-07-13

## 2023-08-29 ENCOUNTER — HOSPITAL ENCOUNTER (OUTPATIENT)
Dept: MAMMOGRAPHY | Facility: HOSPITAL | Age: 48
Discharge: HOME | End: 2023-08-29
Payer: COMMERCIAL

## 2023-08-29 DIAGNOSIS — Z12.31 BREAST CANCER SCREENING BY MAMMOGRAM: ICD-10-CM

## 2023-08-29 PROCEDURE — 77067 SCR MAMMO BI INCL CAD: CPT

## 2023-08-29 PROCEDURE — 77063 BREAST TOMOSYNTHESIS BI: CPT

## 2023-11-07 NOTE — PROGRESS NOTES
A/P    1. Annual exam    Last PAP - 10/17/22 neg neg   Next due 2027   Scheduling of pap discussed in detail      Mammogram - last 8/29/23 # 2   Next do next year    Self breast exams discussed     Colonoscopy - 12/30/2019- good till 2026     2. Bladder pain    Reports that she has some bladder pain   Sometimes feels like no empty but only then passes a small amount   No leakage   No burning with urination    Previous thought to be overactive bladder   Will get sonogram and if nothing pressing consider Uro     UC sent today    Dip looks normal       48 y.o.,No LMP recorded. C/O as detailed above. Past medical / social / surgical / family history reviewed and updated   Medication and allergies discussed in detail and updated     Review of Systems - Negative except the bladder issue       Physical Exam  Vitals reviewed. Exam conducted with a chaperone present. Constitutional:       Appearance: She is well-developed. Neck:      Thyroid: No thyromegaly. Cardiovascular:      Rate and Rhythm: Normal rate. Heart sounds: Normal heart sounds. Pulmonary:      Effort: Pulmonary effort is normal. No accessory muscle usage or respiratory distress. Breath sounds: Normal air entry. Chest:      Chest wall: No tenderness. Breasts:     Breasts are symmetrical.      Right: No inverted nipple, mass or tenderness. Left: No inverted nipple, mass or tenderness. Abdominal:      General: There is no distension. Palpations: Abdomen is soft. Tenderness: There is no abdominal tenderness. There is no right CVA tenderness, left CVA tenderness, guarding or rebound. Genitourinary:     General: Normal vulva. Exam position: Lithotomy position. Labia:         Right: No rash, tenderness, lesion or injury. Left: No rash, tenderness, lesion or injury. Vagina: Normal. No foreign body. No vaginal discharge or bleeding. Cervix: Normal.      Uterus: Normal. Tender.  Not enlarged and not fixed. Adnexa: Right adnexa normal and left adnexa normal.        Right: No mass, tenderness or fullness. Left: No mass, tenderness or fullness. Rectum: No external hemorrhoid. Comments: Super pubic tender on exam   Musculoskeletal:      Cervical back: Normal range of motion. Lymphadenopathy:      Cervical: No cervical adenopathy. Upper Body:      Right upper body: No supraclavicular adenopathy. Left upper body: No supraclavicular adenopathy. Neurological:      Mental Status: She is alert and oriented to person, place, and time. Psychiatric:         Speech: Speech normal.         Behavior: Behavior normal.         Thought Content:  Thought content normal.         Judgment: Judgment normal.

## 2023-11-13 ENCOUNTER — ANNUAL EXAM (OUTPATIENT)
Dept: OBGYN CLINIC | Facility: CLINIC | Age: 48
End: 2023-11-13
Payer: COMMERCIAL

## 2023-11-13 VITALS — HEIGHT: 63 IN | WEIGHT: 180.8 LBS | BODY MASS INDEX: 32.04 KG/M2

## 2023-11-13 DIAGNOSIS — Z01.419 WOMEN'S ANNUAL ROUTINE GYNECOLOGICAL EXAMINATION: Primary | ICD-10-CM

## 2023-11-13 DIAGNOSIS — R39.9 UTI SYMPTOMS: ICD-10-CM

## 2023-11-13 DIAGNOSIS — Z12.4 ENCOUNTER FOR SCREENING FOR CERVICAL CANCER: ICD-10-CM

## 2023-11-13 DIAGNOSIS — R10.2 PELVIC PAIN: ICD-10-CM

## 2023-11-13 PROCEDURE — G0145 SCR C/V CYTO,THINLAYER,RESCR: HCPCS | Performed by: OBSTETRICS & GYNECOLOGY

## 2023-11-13 PROCEDURE — 99396 PREV VISIT EST AGE 40-64: CPT | Performed by: OBSTETRICS & GYNECOLOGY

## 2023-11-13 PROCEDURE — G0476 HPV COMBO ASSAY CA SCREEN: HCPCS | Performed by: OBSTETRICS & GYNECOLOGY

## 2023-11-13 PROCEDURE — 87086 URINE CULTURE/COLONY COUNT: CPT | Performed by: OBSTETRICS & GYNECOLOGY

## 2023-11-14 LAB
BACTERIA UR CULT: NORMAL
HPV HR 12 DNA CVX QL NAA+PROBE: NEGATIVE
HPV16 DNA CVX QL NAA+PROBE: NEGATIVE
HPV18 DNA CVX QL NAA+PROBE: NEGATIVE

## 2023-11-19 LAB
LAB AP GYN PRIMARY INTERPRETATION: NORMAL
Lab: NORMAL

## 2023-11-22 ENCOUNTER — HOSPITAL ENCOUNTER (OUTPATIENT)
Dept: ULTRASOUND IMAGING | Facility: HOSPITAL | Age: 48
Discharge: HOME | End: 2023-11-22
Attending: OBSTETRICS & GYNECOLOGY
Payer: COMMERCIAL

## 2023-11-22 DIAGNOSIS — R10.2 PELVIC PAIN: ICD-10-CM

## 2023-11-22 PROCEDURE — 76830 TRANSVAGINAL US NON-OB: CPT

## 2023-11-22 PROCEDURE — 76856 US EXAM PELVIC COMPLETE: CPT

## 2023-12-14 ENCOUNTER — EVALUATION (OUTPATIENT)
Dept: PHYSICAL THERAPY | Facility: CLINIC | Age: 48
End: 2023-12-14
Payer: COMMERCIAL

## 2023-12-14 DIAGNOSIS — M77.11 RIGHT LATERAL EPICONDYLITIS: Primary | ICD-10-CM

## 2023-12-14 PROCEDURE — 97161 PT EVAL LOW COMPLEX 20 MIN: CPT | Performed by: PHYSICAL THERAPIST

## 2023-12-14 PROCEDURE — 97110 THERAPEUTIC EXERCISES: CPT | Performed by: PHYSICAL THERAPIST

## 2023-12-14 NOTE — PROGRESS NOTES
PT Evaluation     Today's date: 2023  Patient name: Marialuisa Gutierrez  : 1975  MRN: [id number]  Referring provider: Ismael Jacob MD  Dx:   Encounter Diagnosis     ICD-10-CM    1. Right lateral epicondylitis  M77.11       2. Lateral epicondylitis of left elbow  M77.12                      Assessment  Assessment details: Marialuisa Gutierrez is a 50 y.o. female who presents with pain and postural dysfunction. Due to these impairments, patient has difficulty performing engaging in social activities. Patient's clinical presentation is consistent with their referring diagnosis of Right lateral epicondylitis  (primary encounter diagnosis)  . Patient has been educated in home exercise program and plan of care. Patient would benefit from skilled physical therapy services to address their aforementioned functional limitations and progress towards prior level of function and independence with home exercise program.     Impairments: abnormal muscle firing, abnormal or restricted ROM, activity intolerance, impaired physical strength, lacks appropriate home exercise program, pain with function and poor posture   Understanding of Dx/Px/POC: good   Prognosis: good    Goals  Short Term Goals:    1. Initiate and advance HEP  2. AROM Right RD 50 drgrees  3. MMT right wrist flexion 4+/5    Long Term Goals:    1. Indep with HEP  2. Sleep without pain  3.  Use cell phone 3 hours    Plan  Patient would benefit from: skilled PT  Planned modality interventions: cryotherapy, electrical stimulation/Russian stimulation and thermotherapy: hydrocollator packs  Planned therapy interventions: joint mobilization, manual therapy, patient education, postural training, activity modification, abdominal trunk stabilization, body mechanics training, flexibility, functional ROM exercises, graded exercise, home exercise program, neuromuscular re-education, strengthening, stretching, therapeutic activities, therapeutic exercise, motor coordination training, muscle pump exercises, gait training, balance/weight bearing training and ADL training  Frequency: 1x week  Duration in weeks: 8  Treatment plan discussed with: patient        Subjective Evaluation    History of Present Illness  Mechanism of injury: Pt reports insidious onset of right elbow pain beginning in 2023. Pt reports she has tried some stretches at home which "helped some"  Patient Goals  Patient goals for therapy: decreased pain    Pain  Current pain ratin  At best pain ratin  At worst pain ratin  Quality: sharp, dull ache and knife-like  Relieving factors: rest    Treatments  Current treatment: physical therapy    Objective     Static Posture     Head  Forward. Shoulders  Rounded.     Active Range of Motion   Cervical/Thoracic Spine       Cervical    Flexion: 72 degrees   Extension: 31 degrees      Left lateral flexion: 44 degrees      Right lateral flexion: 41 degrees      Left rotation: 79 degrees  Right rotation: 69 degrees     Left Shoulder   Normal active range of motion    Right Shoulder   Normal active range of motion    Left Elbow   Flexion: WFL  Extension: 2 degrees   Forearm supination: WFL  Forearm pronation: WFL    Right Elbow   Flexion: WFL  Extension: 7 degrees   Forearm supination: WFL  Forearm pronation: WFL    Left Wrist   Wrist flexion: WFL  Wrist extension: 60 degrees   Radial deviation: 58 degrees   Ulnar deviation: WFL      Right Wrist   Wrist flexion: WFL  Wrist extension: 56 degrees   Radial deviation: 34 degrees   Ulnar deviation: WFL    Strength/Myotome Testing   Cervical Spine   Neck extension: 4+  Neck flexion: 4    Left   Neck lateral flexion (C3): 4+    Right   Neck lateral flexion (C3): 4+    Left Shoulder     Planes of Motion   Flexion: 4+   Abduction: 4   External rotation at 0°: 4-   Internal rotation at 0°: 4     Right Shoulder     Planes of Motion   Flexion: 4+   Abduction: 4   External rotation at 0°: 4-   Internal rotation at 0°: 4     Left Elbow   Flexion: 4  Extension: 4  Forearm supination: 4  Forearm pronation: 4    Right Elbow   Flexion: 4  Extension: 4  Forearm supination: 4+  Forearm pronation: 4+    Left Wrist/Hand   Wrist extension: 4  Wrist flexion: 4+  Radial deviation: 4+  Ulnar deviation: 4+    Right Wrist/Hand   Wrist extension: 4  Wrist flexion: 4  Radial deviation: 4+  Ulnar deviation: 4    Tests     Left Shoulder   Negative ULTT4. Right Shoulder   Negative ULTT4.      General Comments:      Elbow Comments   Pt with tenderness to palpation on Lateral right elbow             Precautions: Meniere's Disease      Manuals 12/14        MFR to Right Lat Elbow                                    Neuro Re-Ed         Cervical Retraction         Scapular Retraction                                                      Ther Ex         UBE         R Wrist Flexor Stretch 30" hold 3x        Thera Bar Flex         Therabar Ext         Therabar UD         Isidra Montero RD         Hammer Sup         Hammer Pron                           Ther Activity         [place] Carry                  Gait Training                           Modalities

## 2023-12-14 NOTE — LETTER
December 15, 2023    Robbi Mccullough MD  [facility]    Patient: Steve Wooten   YOB: 1975   Date of Visit: 2023     Encounter Diagnosis     ICD-10-CM    1. Right lateral epicondylitis  M77.11           Dear Dr. Jacquelyn Arreola:    Thank you for your recent referral of Steve Wooten. Please review the attached evaluation summary from Di's recent visit. Please verify that you agree with the plan of care by signing the attached order. If you have any questions or concerns, please do not hesitate to call. I sincerely appreciate the opportunity to share in the care of one of your patients and hope to have another opportunity to work with you in the near future. Sincerely,    Yadira Weiner, PT      Referring Provider:      I certify that I have read the below Plan of Care and certify the need for these services furnished under this plan of treatment while under my care. Robbi Mccullough MD  [address]  [address]  Via Fax: [phone]          PT Evaluation     Today's date: 2023  Patient name: Steve Wooten  : 1975  MRN: [id number]  Referring provider: Robbi Mccullough MD  Dx:   Encounter Diagnosis     ICD-10-CM    1. Right lateral epicondylitis  M77.11       2. Lateral epicondylitis of left elbow  M77.12                      Assessment  Assessment details: Steve Wooten is a 50 y.o. female who presents with pain and postural dysfunction. Due to these impairments, patient has difficulty performing engaging in social activities. Patient's clinical presentation is consistent with their referring diagnosis of Right lateral epicondylitis  (primary encounter diagnosis)  . Patient has been educated in home exercise program and plan of care.  Patient would benefit from skilled physical therapy services to address their aforementioned functional limitations and progress towards prior level of function and independence with home exercise program.     Impairments: abnormal muscle firing, abnormal or restricted ROM, activity intolerance, impaired physical strength, lacks appropriate home exercise program, pain with function and poor posture   Understanding of Dx/Px/POC: good   Prognosis: good    Goals  Short Term Goals:    1. Initiate and advance HEP  2. AROM Right RD 50 drgrees  3. MMT right wrist flexion 4+/5    Long Term Goals:    1. Indep with HEP  2. Sleep without pain  3. Use cell phone 3 hours    Plan  Patient would benefit from: skilled PT  Planned modality interventions: cryotherapy, electrical stimulation/Russian stimulation and thermotherapy: hydrocollator packs  Planned therapy interventions: joint mobilization, manual therapy, patient education, postural training, activity modification, abdominal trunk stabilization, body mechanics training, flexibility, functional ROM exercises, graded exercise, home exercise program, neuromuscular re-education, strengthening, stretching, therapeutic activities, therapeutic exercise, motor coordination training, muscle pump exercises, gait training, balance/weight bearing training and ADL training  Frequency: 1x week  Duration in weeks: 8  Treatment plan discussed with: patient        Subjective Evaluation    History of Present Illness  Mechanism of injury: Pt reports insidious onset of right elbow pain beginning in 2023. Pt reports she has tried some stretches at home which "helped some"  Patient Goals  Patient goals for therapy: decreased pain    Pain  Current pain ratin  At best pain ratin  At worst pain ratin  Quality: sharp, dull ache and knife-like  Relieving factors: rest    Treatments  Current treatment: physical therapy    Objective     Static Posture     Head  Forward. Shoulders  Rounded.     Active Range of Motion   Cervical/Thoracic Spine       Cervical    Flexion: 72 degrees   Extension: 31 degrees      Left lateral flexion: 44 degrees Right lateral flexion: 41 degrees      Left rotation: 79 degrees  Right rotation: 69 degrees     Left Shoulder   Normal active range of motion    Right Shoulder   Normal active range of motion    Left Elbow   Flexion: WFL  Extension: 2 degrees   Forearm supination: WFL  Forearm pronation: WFL    Right Elbow   Flexion: WFL  Extension: 7 degrees   Forearm supination: WFL  Forearm pronation: WFL    Left Wrist   Wrist flexion: WFL  Wrist extension: 60 degrees   Radial deviation: 58 degrees   Ulnar deviation: WFL      Right Wrist   Wrist flexion: WFL  Wrist extension: 56 degrees   Radial deviation: 34 degrees   Ulnar deviation: WFL    Strength/Myotome Testing   Cervical Spine   Neck extension: 4+  Neck flexion: 4    Left   Neck lateral flexion (C3): 4+    Right   Neck lateral flexion (C3): 4+    Left Shoulder     Planes of Motion   Flexion: 4+   Abduction: 4   External rotation at 0°: 4-   Internal rotation at 0°: 4     Right Shoulder     Planes of Motion   Flexion: 4+   Abduction: 4   External rotation at 0°: 4-   Internal rotation at 0°: 4     Left Elbow   Flexion: 4  Extension: 4  Forearm supination: 4  Forearm pronation: 4    Right Elbow   Flexion: 4  Extension: 4  Forearm supination: 4+  Forearm pronation: 4+    Left Wrist/Hand   Wrist extension: 4  Wrist flexion: 4+  Radial deviation: 4+  Ulnar deviation: 4+    Right Wrist/Hand   Wrist extension: 4  Wrist flexion: 4  Radial deviation: 4+  Ulnar deviation: 4    Tests     Left Shoulder   Negative ULTT4. Right Shoulder   Negative ULTT4.      General Comments:      Elbow Comments   Pt with tenderness to palpation on Lateral right elbow             Precautions: Meniere's Disease      Manuals 12/14        MFR to Right Lat Elbow                                    Neuro Re-Ed         Cervical Retraction         Scapular Retraction                                                      Ther Ex         UBE         R Wrist Flexor Stretch 30" hold 3x        Thera Bar Flex Therabar Ext         Therabar UD         Garret Hernandez RD         Hammer Sup         Hammer Pron                           Ther Activity         [place] Carry                  Gait Training                           Modalities

## 2023-12-22 ENCOUNTER — OFFICE VISIT (OUTPATIENT)
Dept: PHYSICAL THERAPY | Facility: CLINIC | Age: 48
End: 2023-12-22
Payer: COMMERCIAL

## 2023-12-22 DIAGNOSIS — M77.11 RIGHT LATERAL EPICONDYLITIS: Primary | ICD-10-CM

## 2023-12-22 PROCEDURE — 97140 MANUAL THERAPY 1/> REGIONS: CPT

## 2023-12-22 PROCEDURE — 97110 THERAPEUTIC EXERCISES: CPT

## 2023-12-22 NOTE — PROGRESS NOTES
"Daily Note     Today's date: 2023  Patient name: Di Payne  : 1975  MRN: [id number]  Referring provider: Cesar Bateman MD  Dx:   Encounter Diagnosis     ICD-10-CM    1. Right lateral epicondylitis  M77.11           Start Time: 0700  Stop Time: 0740  Total time in clinic (min): 40 minutes    Subjective: Patient reports feeling sore with her self stretch. She states most of her pain is when she is sleeping. She states she can't find a comfortable position for her elbow not to hurt so she ends up bending it up against her body for the best comfort.       Objective: See treatment diary below      Assessment: Tolerated treatment well. She was able to progress without increased pain. Most challenged with self stretch. Patient demonstrated fatigue post treatment, exhibited good technique with therapeutic exercises, and would benefit from continued PT to meet functional goals. Recommended ice for home to decrease soreness.       Plan: Continue per plan of care.  Progress slow due to tolerance.      Precautions: Meniere's Disease      Manuals        MFR to Right Lat Elbow  KL                                  Neuro Re-Ed         Cervical Retraction  10x       Scapular Retraction                                                      Ther Ex         UBE         R Wrist Flexor Stretch 30\" hold 3x 3x       Thera Bar Flex  2 mins       Therabar Ext         Therabar UD         Therabar RD         Hammer Sup  2x10x       Hammer Pron                           Ther Activity         [place] Carry                  Gait Training                           Modalities                                "

## 2023-12-29 ENCOUNTER — OFFICE VISIT (OUTPATIENT)
Dept: PHYSICAL THERAPY | Facility: CLINIC | Age: 48
End: 2023-12-29
Payer: COMMERCIAL

## 2023-12-29 DIAGNOSIS — M77.11 RIGHT LATERAL EPICONDYLITIS: Primary | ICD-10-CM

## 2023-12-29 PROCEDURE — 97110 THERAPEUTIC EXERCISES: CPT

## 2023-12-29 PROCEDURE — 97140 MANUAL THERAPY 1/> REGIONS: CPT

## 2023-12-29 NOTE — PROGRESS NOTES
"Daily Note     Today's date: 2023  Patient name: Di JI Eastaureliano  : 1975  MRN: [id number]  Referring provider: Cesar Bateman MD  Dx:   Encounter Diagnosis     ICD-10-CM    1. Right lateral epicondylitis  M77.11                      Subjective: patient stated her elbow is feeling good today. Patient stated she has started prednisone 3 days ago for respiratory problems.       Objective: See treatment diary below      Assessment: Patient tolerated treatment well. No exacerbation of sx with program. Patient able to complete all tasks assigned with good execution.Patient would benefit from continued therapy to decrease pain and increase strength and flexibility to improve LOF..       Plan: Continue per plan of care.  Progress treatment as tolerated.       Precautions: Meniere's Disease      Manuals       MFR to Right Lat Elbow  KL 15'                                 Neuro Re-Ed         Cervical Retraction  10x 15x      Scapular Retraction                                                      Ther Ex         UBE   110 resist 6 min      R Wrist Flexor Stretch 30\" hold 3x 3x 3x      Thera Bar Flex  2 mins 2x10      Therabar Ext   2x10      Therabar UD   2x10      Therabar RD   2x10      Hammer Sup  2x10x 2x10      Hammer Pron   2x10                        Ther Activity         [place] Carry                  Gait Training                           Modalities                                  "

## 2024-01-05 ENCOUNTER — OFFICE VISIT (OUTPATIENT)
Dept: PHYSICAL THERAPY | Facility: CLINIC | Age: 49
End: 2024-01-05
Payer: COMMERCIAL

## 2024-01-05 DIAGNOSIS — H81.10 BENIGN PAROXYSMAL POSITIONAL VERTIGO, UNSPECIFIED LATERALITY: Primary | ICD-10-CM

## 2024-01-05 PROCEDURE — 97140 MANUAL THERAPY 1/> REGIONS: CPT | Performed by: PHYSICAL THERAPIST

## 2024-01-05 PROCEDURE — 97161 PT EVAL LOW COMPLEX 20 MIN: CPT | Performed by: PHYSICAL THERAPIST

## 2024-01-05 NOTE — PROGRESS NOTES
PT Evaluation     Today's date: 2024  Patient name: Di Payne  : 1975  MRN: [id number]  Referring provider: Cesar Bateman MD  Dx:   Encounter Diagnosis     ICD-10-CM    1. Benign paroxysmal positional vertigo, unspecified laterality  H81.10                      Assessment  Assessment details: Di Payne is a 48 y.o. female who presents with dizziness. Due to these impairments, patient has difficulty performing ADL's, recreational activities, engaging in social activities, ambulation, transfers. Patient's clinical presentation is consistent with their referring diagnosis of Benign paroxysmal positional vertigo, unspecified laterality  (primary encounter diagnosis)  . Patient has been educated in plan of care. Patient would benefit from skilled physical therapy services to address their aforementioned functional limitations and progress towards prior level of function and independence with home exercise program.     Impairments: abnormal or restricted ROM, activity intolerance, lacks appropriate home exercise program and pain with function  Understanding of Dx/Px/POC: good   Prognosis: good    Goals  Short Term Goals:    1. Return to baseline      Plan  Plan details: Discharge if Sx's return to baseline  Patient would benefit from: skilled PT  Planned therapy interventions: joint mobilization, manual therapy, patient education, postural training, activity modification, body mechanics training, flexibility, functional ROM exercises, graded exercise, home exercise program, neuromuscular re-education, stretching, therapeutic activities, therapeutic exercise, motor coordination training, gait training, balance/weight bearing training and ADL training  Frequency: 3x week  Duration in visits: 6  Treatment plan discussed with: patient      Subjective Evaluation    History of Present Illness  Mechanism of injury: Pt reports BPPV on and off since approximately .  On 2024 she got out of  "bed and almost fell over.  Then almost fell when she tilted her head back to gargle. She reports \"everything is spinning to the left. Pt reports Sx's are slightly improved today but feels very off balance.  Patient Goals  Patient goals for therapy: improved balance, independence with ADLs/IADLs and return to sport/leisure activities    Pain  Exacerbated by: turning eyes or head, changing positions.    Treatments  Previous treatment: physical therapy  Current treatment: physical therapy    Objective     Active Range of Motion   Cervical/Thoracic Spine       Cervical    Flexion: 76 degrees   Extension: 33 degrees      Left lateral flexion: 49 degrees      Right lateral flexion: 42 degrees      Left rotation: 61 degrees  Right rotation: 65 degrees       Strength/Myotome Testing   Cervical Spine   Neck extension: 4+  Neck flexion: 4+    Left   Neck lateral flexion (C3): 4+    Right   Neck lateral flexion (C3): 4+    General Comments:      Cervical/Thoracic Comments  No saccades noticed    Slight tracking issue when eyes are returning to center from looking left    BPPV: Right Post Canal             Precautions: Migraines      Manuals 1/5        Eply Maneuver 25'                                   Neuro Re-Ed                                                                        Ther Ex                                                                                 Ther Activity                           Gait Training                           Modalities                                "

## 2024-01-05 NOTE — LETTER
2024      No Recipients    Patient: Di Payne   YOB: 1975   Date of Visit: 2024     Encounter Diagnosis     ICD-10-CM    1. Benign paroxysmal positional vertigo, unspecified laterality  H81.10           Dear Dr. Bateman:    Thank you for your recent referral of Di Payne. Please review the attached evaluation summary from Di's recent visit.     Please verify that you agree with the plan of care by signing the attached order.     If you have any questions or concerns, please do not hesitate to call.     I sincerely appreciate the opportunity to share in the care of one of your patients and hope to have another opportunity to work with you in the near future.       Sincerely,    Jefe Koo, PT      Referring Provider:      I certify that I have read the below Plan of Care and certify the need for these services furnished under this plan of treatment while under my care.                    Cesar Bateman MD  [address] [place] [id number]  Via Fax: [phone]          PT Evaluation     Today's date: 2024  Patient name: Di Payne  : 1975  MRN: [id number]  Referring provider: Cesar Bateman MD  Dx:   Encounter Diagnosis     ICD-10-CM    1. Benign paroxysmal positional vertigo, unspecified laterality  H81.10                      Assessment  Assessment details: Di Payne is a 48 y.o. female who presents with dizziness. Due to these impairments, patient has difficulty performing ADL's, recreational activities, engaging in social activities, ambulation, transfers. Patient's clinical presentation is consistent with their referring diagnosis of Benign paroxysmal positional vertigo, unspecified laterality  (primary encounter diagnosis)  . Patient has been educated in plan of care. Patient would benefit from skilled physical therapy services to address their aforementioned functional limitations and progress towards prior level of  "function and independence with home exercise program.     Impairments: abnormal or restricted ROM, activity intolerance, lacks appropriate home exercise program and pain with function  Understanding of Dx/Px/POC: good   Prognosis: good    Goals  Short Term Goals:    1. Return to baseline      Plan  Plan details: Discharge if Sx's return to baseline  Patient would benefit from: skilled PT  Planned therapy interventions: joint mobilization, manual therapy, patient education, postural training, activity modification, body mechanics training, flexibility, functional ROM exercises, graded exercise, home exercise program, neuromuscular re-education, stretching, therapeutic activities, therapeutic exercise, motor coordination training, gait training, balance/weight bearing training and ADL training  Frequency: 3x week  Duration in visits: 6  Treatment plan discussed with: patient      Subjective Evaluation    History of Present Illness  Mechanism of injury: Pt reports BPPV on and off since approximately 2010.  On 1/4/2024 she got out of bed and almost fell over.  Then almost fell when she tilted her head back to gargle. She reports \"everything is spinning to the left. Pt reports Sx's are slightly improved today but feels very off balance.  Patient Goals  Patient goals for therapy: improved balance, independence with ADLs/IADLs and return to sport/leisure activities    Pain  Exacerbated by: turning eyes or head, changing positions.    Treatments  Previous treatment: physical therapy  Current treatment: physical therapy    Objective     Active Range of Motion   Cervical/Thoracic Spine       Cervical    Flexion: 76 degrees   Extension: 33 degrees      Left lateral flexion: 49 degrees      Right lateral flexion: 42 degrees      Left rotation: 61 degrees  Right rotation: 65 degrees       Strength/Myotome Testing   Cervical Spine   Neck extension: 4+  Neck flexion: 4+    Left   Neck lateral flexion (C3): 4+    Right   Neck " lateral flexion (C3): 4+    General Comments:      Cervical/Thoracic Comments  No saccades noticed    Slight tracking issue when eyes are returning to center from looking left    BPPV: Right Post Canal             Precautions: Migraines      Manuals 1/5        Eply Maneuver 25'                                   Neuro Re-Ed                                                                        Ther Ex                                                                                 Ther Activity                           Gait Training                           Modalities

## 2024-01-12 ENCOUNTER — OFFICE VISIT (OUTPATIENT)
Dept: PHYSICAL THERAPY | Facility: CLINIC | Age: 49
End: 2024-01-12
Payer: COMMERCIAL

## 2024-01-12 DIAGNOSIS — M77.11 RIGHT LATERAL EPICONDYLITIS: Primary | ICD-10-CM

## 2024-01-12 DIAGNOSIS — H81.10 BENIGN PAROXYSMAL POSITIONAL VERTIGO, UNSPECIFIED LATERALITY: ICD-10-CM

## 2024-01-12 PROCEDURE — 97140 MANUAL THERAPY 1/> REGIONS: CPT

## 2024-01-12 PROCEDURE — 97110 THERAPEUTIC EXERCISES: CPT

## 2024-01-12 NOTE — PROGRESS NOTES
"Daily Note     Today's date: 2024  Patient name: Di JI   : 1975  MRN: [id number]  Referring provider: Cesar Bateman MD  Dx:   Encounter Diagnosis     ICD-10-CM    1. Right lateral epicondylitis  M77.11       2. Benign paroxysmal positional vertigo, unspecified laterality  H81.10           Start Time: 07  Stop Time: 743  Total time in clinic (min): 41 minutes    Subjective: Alma Delia reported that her right elbow has been feeling much better, and she has noticed a significant decrease in her pain. She noted that she had to perform the Epley on  due to her symptom exacerbation while leaning over to look into her bath tub. Since , her symptoms have been less in severity, but she continues to feel a sense of \"off-ness.\"       Objective: See treatment diary below      Assessment: Alma Delia tolerated treatment well. She had a (-) Villisca-Hallpike test to the right, and an Epley maneuver was subsequently performed to ensure appropriate repositioning if any otolithic debris was present. Verbal cues and demonstrations were utilized for exercise recall. Manual therapy of the lateral aspect of the right elbow was performed to tolerance, and she noted no discomfort post-treatment. Alma Delia exhibited good technique with therapeutic exercises and would benefit from continued PT to reduce her symptom irritability during occupation-related RUE tasks and prevent exacerbation of her vertiginous symptoms during forward bending.       Plan: Continue per plan of care.      Precautions: Migraines    Manuals      MFR to Right Lat Elbow  KL 15' 10 min              Epley Maneuver for R Post Canalithiasis    (-)              Neuro Re-Ed         Cervical Retraction  10x 15x 15x     Scapular Retraction                                                      Ther Ex         UBE   110 resist 6 min 6 min  110 resist     R Wrist Flexor Stretch 30\" hold 3x 3x 3x 3x     Thera Bar Flex  2 mins 2x10  Red " 2x10  Red     Therabar Ext   2x10  Red 2x10  Red     Therabar UD   2x10 2x10  Red     Therabar RD   2x10 2x10  Red     Hammer Sup  2x10x 2x10 2x10  Red     Hammer Pron   2x10 2x10  Red                       Ther Activity         [place] Carry                  Gait Training                           Modalities

## 2024-01-17 ENCOUNTER — OFFICE VISIT (OUTPATIENT)
Dept: PHYSICAL THERAPY | Facility: CLINIC | Age: 49
End: 2024-01-17
Payer: COMMERCIAL

## 2024-01-17 DIAGNOSIS — M77.11 RIGHT LATERAL EPICONDYLITIS: Primary | ICD-10-CM

## 2024-01-17 PROCEDURE — 97110 THERAPEUTIC EXERCISES: CPT

## 2024-01-17 PROCEDURE — 97140 MANUAL THERAPY 1/> REGIONS: CPT

## 2024-01-17 NOTE — PROGRESS NOTES
"Daily Note     Today's date: 2024  Patient name: Di JI   : 1975  MRN: [id number]  Referring provider: Cesar Bateman MD  Dx:   Encounter Diagnosis     ICD-10-CM    1. Right lateral epicondylitis  M77.11           Start Time: 701  Stop Time: 731  Total time in clinic (min): 30 minutes    Subjective: Alma Delia reported that her right elbow is sore today, which she attributed to sleeping.       Objective: See treatment diary below      Assessment: Alma Delia tolerated treatment well. An alternative to TheraBar RD and UD was provided, and she noted that she felt the muscle \"work more\" with the dumbbells compared to the TheraBar. Slight swelling was present in the lateral epicondylar region, and she noted an improvement of symptoms following MFR and STM. Alma Delia would benefit from continued PT to reduce her symptom irritability in frequency and duration through stretching and strengthening of the wrist extensors and flexors.       Plan: Continue per plan of care.      Precautions: Migraines    Manuals     MFR to Right Lat Elbow  KL 15' 10 min 10 min             Epley Maneuver for R Post Canalithiasis    (-)              Neuro Re-Ed         Cervical Retraction  10x 15x 15x 20x    Scapular Retraction     15x                                                 Ther Ex         UBE   110 resist 6 min 6 min  110 resist 6 min  110 resist    R Wrist Flexor Stretch 30\" hold 3x 3x 3x 3x 3x    Thera Bar Flex  2 mins 2x10  Red 2x10  Red 2x10  Red    Therabar Ext   2x10  Red 2x10  Red 2x10  Red    Therabar UD   2x10 2x10  Red 2x10  Red    Therabar RD   2x10 2x10  Red 2x10  Red    Wrist RD w/ DB     20x  2 lbs    Wrist UD w/ DB     20x  2 lbs    Hammer Sup  2x10x 2x10 2x10  Red 2x10  Red    Hammer Pron   2x10 2x10  Red 2x10  Red                      Ther Activity         [place] Carry                  Gait Training                           Modalities                                  "

## 2024-01-19 ENCOUNTER — APPOINTMENT (OUTPATIENT)
Dept: PHYSICAL THERAPY | Facility: CLINIC | Age: 49
End: 2024-01-19
Payer: COMMERCIAL

## 2024-01-24 ENCOUNTER — OFFICE VISIT (OUTPATIENT)
Dept: PHYSICAL THERAPY | Facility: CLINIC | Age: 49
End: 2024-01-24
Payer: COMMERCIAL

## 2024-01-24 DIAGNOSIS — M77.11 RIGHT LATERAL EPICONDYLITIS: Primary | ICD-10-CM

## 2024-01-24 PROCEDURE — 97110 THERAPEUTIC EXERCISES: CPT

## 2024-01-24 PROCEDURE — 97140 MANUAL THERAPY 1/> REGIONS: CPT

## 2024-01-24 NOTE — PROGRESS NOTES
"Daily Note     Today's date: 2024  Patient name: Di JI   : 1975  MRN: [id number]  Referring provider: Cesar Bateman MD  Dx:   Encounter Diagnosis     ICD-10-CM    1. Right lateral epicondylitis  M77.11           Start Time: 1402  Stop Time: 1446  Total time in clinic (min): 44 minutes    Subjective: Alma Delia reported that her right elbow feels good most of the time, but she has pain when she sleeps that can last for most of the day. She thinks that it may be caused by the way she is holding her phone.       Objective: See treatment diary below      Assessment: Alma Delia tolerated treatment well. Following a discussion with her primary physical therapist, wrist curls were added to her program with Alma Delia reporting fatigue in her forearms afterward. TTP was localized to the medial aspect of the lateral epicondyle. STM was performed on the lateral aspect of the right elbow into the extensors, and she stated that her symptoms reduced following treatment. Alma Delia would benefit from continued PT to improve the strength in her right elbow and reduce her frequency of her symptom irritability.       Plan: Continue per plan of care.      Precautions: Migraines    Manuals    MFR to Right Lat Elbow  KL 15' 10 min 10 min 14 min            Epley Maneuver for R Post Canalithiasis    (-)              Neuro Re-Ed         Cervical Retraction  10x 15x 15x 20x 20x   Scapular Retraction     15x 20x3\"                                                Ther Ex         UBE   110 resist 6 min 6 min  110 resist 6 min  110 resist 6 min  100 resist   R Wrist Flexor Stretch 30\" hold 3x 3x 3x 3x 3x 3x   Thera Bar Flex  2 mins 2x10  Red 2x10  Red 2x10  Red 3x10  Green   Therabar Ext   2x10  Red 2x10  Red 2x10  Red 3x10  Green   Therabar UD   2x10 2x10  Red 2x10  Red 3x10  Red   Therabar RD   2x10 2x10  Red 2x10  Red 3x10  Red   TheraBar PRO     2x10  Red 3x10  Red   TheraBar SUP     2x10  Red " 3x10  Red   Wrist RD w/ DB     20x  2 lbs 20x  2 lbs   Wrist UD w/ DB     20x  2 lbs 20x  2 lbs   Hammer Sup  2x10x 2x10   2x10  24 oz   Hammer Pron   2x10   2x10  24 oz   Wrist Curls      5x  2 lbs            Ther Activity         [place] Carry                  Gait Training                           Modalities

## 2024-01-26 ENCOUNTER — APPOINTMENT (OUTPATIENT)
Dept: PHYSICAL THERAPY | Facility: CLINIC | Age: 49
End: 2024-01-26
Payer: COMMERCIAL

## 2024-02-02 ENCOUNTER — EVALUATION (OUTPATIENT)
Dept: PHYSICAL THERAPY | Facility: CLINIC | Age: 49
End: 2024-02-02
Payer: COMMERCIAL

## 2024-02-02 DIAGNOSIS — H81.10 BENIGN PAROXYSMAL POSITIONAL VERTIGO, UNSPECIFIED LATERALITY: ICD-10-CM

## 2024-02-02 DIAGNOSIS — M77.11 RIGHT LATERAL EPICONDYLITIS: Primary | ICD-10-CM

## 2024-02-02 PROCEDURE — 97110 THERAPEUTIC EXERCISES: CPT | Performed by: PHYSICAL THERAPIST

## 2024-02-02 PROCEDURE — 97140 MANUAL THERAPY 1/> REGIONS: CPT | Performed by: PHYSICAL THERAPIST

## 2024-02-02 NOTE — LETTER
2024    Cesar Bateman MD  [address] [place] [address]    Patient: Di Payne   YOB: 1975   Date of Visit: 2024     Encounter Diagnosis     ICD-10-CM    1. Right lateral epicondylitis  M77.11       2. Benign paroxysmal positional vertigo, unspecified laterality  H81.10           Dear Dr. Bateman:    Thank you for your recent referral of Di Payne. Please review the attached evaluation summary from Di's recent visit.     Please verify that you agree with the plan of care by signing the attached order.     If you have any questions or concerns, please do not hesitate to call.     I sincerely appreciate the opportunity to share in the care of one of your patients and hope to have another opportunity to work with you in the near future.       Sincerely,    Jefe Koo, PT      Referring Provider:      I certify that I have read the below Plan of Care and certify the need for these services furnished under this plan of treatment while under my care.                    Cesar Bateman MD  [address] [place] [address]  Via Fax: [phone]          PT Re-Evaluation  and PT Discharge    Today's date: 2024  Patient name: Di Payne  : 1975  MRN: [id number]  Referring provider: Cesar Bateman MD  Dx:   Encounter Diagnosis     ICD-10-CM    1. Right lateral epicondylitis  M77.11       2. Benign paroxysmal positional vertigo, unspecified laterality  H81.10                      Assessment  Assessment details: Di Payne is a 48 y.o. female who presented with pain and postural dysfunction. She demonstrates improved strength and ROM.  She reports improved function.  She is ready to be discharged to an [facility].  Impairments: abnormal muscle firing, abnormal or restricted ROM, activity intolerance, impaired physical strength, lacks appropriate home exercise program, pain with function and poor posture   Understanding of Dx/Px/POC: good  "  Prognosis: good    Goals  Short Term Goals:    1. Initiate and advance HEP - Partially MET  2. AROM Right RD 50 degrees - Partially MET  3. MMT right wrist flexion 4+/5 - MET    Long Term Goals:    1. Indep with HEP  2. Sleep without pain - Partially MET  3. Use cell phone 3 hours - MET    Plan  Plan details: Discharge to an [facility]  Planned therapy interventions: home exercise program  Treatment plan discussed with: patient        Subjective Evaluation    History of Present Illness  Mechanism of injury: Pt reports insidious onset of right elbow pain beginning in 2023.  Pt reports she has tried some stretches at home which \"helped some\"  Patient Goals  Patient goals for therapy: decreased pain    Pain  Current pain ratin  At best pain ratin  At worst pain rating: 3  Quality: dull ache  Relieving factors: rest (stretch)  Exacerbated by: sleeping.  Progression: improved    Treatments  Current treatment: physical therapy      Objective     Static Posture     Head  Forward.    Shoulders  Rounded.    Active Range of Motion   Cervical/Thoracic Spine       Cervical    Flexion: 73 degrees   Extension: 52 degrees      Left lateral flexion: 51 degrees      Right lateral flexion: 44 degrees      Left rotation: 76 degrees  Right rotation: 75 degrees     Left Shoulder   Normal active range of motion    Right Shoulder   Normal active range of motion    Left Elbow   Flexion: WFL  Extension: 2 degrees   Forearm supination: WFL  Forearm pronation: WFL    Right Elbow   Flexion: WFL  Extension: 7 degrees   Forearm supination: WFL  Forearm pronation: WFL    Left Wrist   Wrist flexion: WFL  Wrist extension: 60 degrees   Radial deviation: 58 degrees   Ulnar deviation: WFL      Right Wrist   Wrist flexion: WFL  Wrist extension: 62 degrees   Radial deviation: 46 degrees   Ulnar deviation: WFL    Strength/Myotome Testing   Cervical Spine   Neck extension: 4+  Neck flexion: 4    Left   Neck lateral flexion (C3): 4+    Right " "  Neck lateral flexion (C3): 4+    Left Shoulder     Planes of Motion   Flexion: 4+   Abduction: 4   External rotation at 0°: 4-   Internal rotation at 0°: 4     Right Shoulder     Planes of Motion   Flexion: 4+   Abduction: 4   External rotation at 0°: 4-   Internal rotation at 0°: 4     Left Elbow   Flexion: 4  Extension: 4  Forearm supination: 4  Forearm pronation: 4    Right Elbow   Flexion: 4  Extension: 4  Forearm supination: 4+  Forearm pronation: 4+    Left Wrist/Hand   Wrist extension: 4  Wrist flexion: 4+  Radial deviation: 4+  Ulnar deviation: 4+    Right Wrist/Hand   Wrist extension: 4  Wrist flexion: 4+  Radial deviation: 4+  Ulnar deviation: 4+    Tests     Left Shoulder   Negative ULTT4.     Right Shoulder   Negative ULTT4.              Precautions: Migraines     Manuals 2/2 12/29 1/12 1/17 1/24   MFR to Right Lat Elbow   15' 10 min 10 min 14 min                 Epley Maneuver for R Post Canalithiasis     (-)                     Neuro Re-Ed             Cervical Retraction   15x 15x 20x 20x   Scapular Retraction       15x 20x3\"                                                                         Ther Ex              ALT  6 min  110 resist 6 min 6 min  110 resist 6 min  110 resist 6 min  100 resist   R Wrist Flexor Stretch 30\" hold   3x 3x 3x 3x   Thera Bar Flex   2x10  Red 2x10  Red 2x10  Red 3x10  Green   Therabar Ext   2x10  Red 2x10  Red 2x10  Red 3x10  Green   Therabar UD   2x10 2x10  Red 2x10  Red 3x10  Red   Therabar RD   2x10 2x10  Red 2x10  Red 3x10  Red   TheraBar PRO       2x10  Red 3x10  Red   TheraBar SUP       2x10  Red 3x10  Red   Wrist RD w/ DB       20x  2 lbs 20x  2 lbs   Wrist UD w/ DB       20x  2 lbs 20x  2 lbs   Hammer Sup   2x10     2x10  24 oz   Hammer Pron   2x10     2x10  24 oz   Wrist Curls         5x  2 lbs                 Ther Activity             [place] Carry                           Gait Training                                         Modalities               "

## 2024-02-02 NOTE — PROGRESS NOTES
"PT Re-Evaluation  and PT Discharge    Today's date: 2024  Patient name: Di Payne  : 1975  MRN: [id number]  Referring provider: Cesar Bateman MD  Dx:   Encounter Diagnosis     ICD-10-CM    1. Right lateral epicondylitis  M77.11       2. Benign paroxysmal positional vertigo, unspecified laterality  H81.10                      Assessment  Assessment details: Di Payne is a 48 y.o. female who presented with pain and postural dysfunction. She demonstrates improved strength and ROM.  She reports improved function.  She is ready to be discharged to an [facility].  Impairments: abnormal muscle firing, abnormal or restricted ROM, activity intolerance, impaired physical strength, lacks appropriate home exercise program, pain with function and poor posture   Understanding of Dx/Px/POC: good   Prognosis: good    Goals  Short Term Goals:    1. Initiate and advance HEP - Partially MET  2. AROM Right RD 50 degrees - Partially MET  3. MMT right wrist flexion 4+/5 - MET    Long Term Goals:    1. Indep with HEP  2. Sleep without pain - Partially MET  3. Use cell phone 3 hours - MET    Plan  Plan details: Discharge to an [facility]  Planned therapy interventions: home exercise program  Treatment plan discussed with: patient        Subjective Evaluation    History of Present Illness  Mechanism of injury: Pt reports insidious onset of right elbow pain beginning in 2023.  Pt reports she has tried some stretches at home which \"helped some\"  Patient Goals  Patient goals for therapy: decreased pain    Pain  Current pain ratin  At best pain ratin  At worst pain rating: 3  Quality: dull ache  Relieving factors: rest (stretch)  Exacerbated by: sleeping.  Progression: improved    Treatments  Current treatment: physical therapy      Objective     Static Posture     Head  Forward.    Shoulders  Rounded.    Active Range of Motion   Cervical/Thoracic Spine       Cervical    Flexion: 73 degrees   Extension: 52 " "degrees      Left lateral flexion: 51 degrees      Right lateral flexion: 44 degrees      Left rotation: 76 degrees  Right rotation: 75 degrees     Left Shoulder   Normal active range of motion    Right Shoulder   Normal active range of motion    Left Elbow   Flexion: WFL  Extension: 2 degrees   Forearm supination: WFL  Forearm pronation: WFL    Right Elbow   Flexion: WFL  Extension: 7 degrees   Forearm supination: WFL  Forearm pronation: WFL    Left Wrist   Wrist flexion: WFL  Wrist extension: 60 degrees   Radial deviation: 58 degrees   Ulnar deviation: WFL      Right Wrist   Wrist flexion: WFL  Wrist extension: 62 degrees   Radial deviation: 46 degrees   Ulnar deviation: WFL    Strength/Myotome Testing   Cervical Spine   Neck extension: 4+  Neck flexion: 4    Left   Neck lateral flexion (C3): 4+    Right   Neck lateral flexion (C3): 4+    Left Shoulder     Planes of Motion   Flexion: 4+   Abduction: 4   External rotation at 0°: 4-   Internal rotation at 0°: 4     Right Shoulder     Planes of Motion   Flexion: 4+   Abduction: 4   External rotation at 0°: 4-   Internal rotation at 0°: 4     Left Elbow   Flexion: 4  Extension: 4  Forearm supination: 4  Forearm pronation: 4    Right Elbow   Flexion: 4  Extension: 4  Forearm supination: 4+  Forearm pronation: 4+    Left Wrist/Hand   Wrist extension: 4  Wrist flexion: 4+  Radial deviation: 4+  Ulnar deviation: 4+    Right Wrist/Hand   Wrist extension: 4  Wrist flexion: 4+  Radial deviation: 4+  Ulnar deviation: 4+    Tests     Left Shoulder   Negative ULTT4.     Right Shoulder   Negative ULTT4.              Precautions: Migraines     Manuals 2/2 12/29 1/12 1/17 1/24   MFR to Right Lat Elbow   15' 10 min 10 min 14 min                 Epley Maneuver for R Post Canalithiasis     (-)                     Neuro Re-Ed             Cervical Retraction   15x 15x 20x 20x   Scapular Retraction       15x 20x3\"                                                                       " "  Ther Ex              ALT  6 min  110 resist 6 min 6 min  110 resist 6 min  110 resist 6 min  100 resist   R Wrist Flexor Stretch 30\" hold   3x 3x 3x 3x   Thera Bar Flex   2x10  Red 2x10  Red 2x10  Red 3x10  Green   Therabar Ext   2x10  Red 2x10  Red 2x10  Red 3x10  Green   Therabar UD   2x10 2x10  Red 2x10  Red 3x10  Red   Therabar RD   2x10 2x10  Red 2x10  Red 3x10  Red   TheraBar PRO       2x10  Red 3x10  Red   TheraBar SUP       2x10  Red 3x10  Red   Wrist RD w/ DB       20x  2 lbs 20x  2 lbs   Wrist UD w/ DB       20x  2 lbs 20x  2 lbs   Hammer Sup   2x10     2x10  24 oz   Hammer Pron   2x10     2x10  24 oz   Wrist Curls         5x  2 lbs                 Ther Activity             New Home Carry                           Gait Training                                         Modalities                                                       "

## 2024-04-05 NOTE — PROGRESS NOTES
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, vestibular migraines, and       Objective: See treatment diary below      Assessment: Challenged with VOR exercises due to dizziness with increased time required to return to baseline dizziness  Overall demo good balance during amb with HT/HN despite increased dizziness  Patient would benefit from continued PT      Plan: Progress treatment as tolerated         Precautions: Falls, Mal de Embarquement  Re-eval Date:   HEP: [id number]     Date 3/30 4/6 4/8 4/13    Visit Count 1 2  3 4    FOTO        Pain In        Pain Out          Manuals 3/20 4/6                                      Neuro Re-Ed   W/cam boot LLE     Gaze Stabilization        VOR near  R/L & Up/down  Stand  Feet together  Busy   60" each Stand/firm  FT  Busy  1 min ea  105 horiz  110 vert Standing, level busy 1 min x 2 H/V    VOR Far  R/L & Up/down  Stand  Feet together  Busy   60" each Stand/firm  FT  Busy  1 min ea  115 horiz  120 vert     VOR cancel    Standing, level 45" x 2 H/V    Habituation        Static Balance  Foam Rhomberg  EO & EC  30" x 2 ea  Foam Rhomberg  EO & EC  30" x 2 ea  FAEC foam 30" x 3    FTEO with HT/HN foam 30" x 2     Diagonals  Stand  Busy  R/L - high  R/L - low  60" each  Eyes only then Eyes/Head Stand/busy  Horizontal  60"  Vertical 60"      Eyes/Head   Stand/busy  Horizontal 60"  Vertical 60"     Cone pick ups    10x     Ther Ex        Neurocom *baseline                                                               Ther Activity        Walking w/head turns/nods  15' x 4 each w/head turns and nods  CGA 25' x 4 each w/head turns and nods  CS/CGA 20' x 4 H/V     Walking with turns    180 deg 20' x 2 Gait Training                        Modalities negative

## 2024-04-11 ENCOUNTER — EVALUATION (OUTPATIENT)
Dept: PHYSICAL THERAPY | Facility: CLINIC | Age: 49
End: 2024-04-11
Payer: COMMERCIAL

## 2024-04-11 DIAGNOSIS — R42 VERTIGO: Primary | ICD-10-CM

## 2024-04-11 DIAGNOSIS — H81.90 UNSPECIFIED VERTIGINOUS SYNDROMES AND LABYRINTHINE DISORDERS: ICD-10-CM

## 2024-04-11 PROCEDURE — 97112 NEUROMUSCULAR REEDUCATION: CPT | Performed by: PHYSICAL THERAPIST

## 2024-04-11 PROCEDURE — 97161 PT EVAL LOW COMPLEX 20 MIN: CPT | Performed by: PHYSICAL THERAPIST

## 2024-04-11 NOTE — LETTER
2024      No Recipients    Patient: Di Payne   YOB: 1975   Date of Visit: 2024     Encounter Diagnosis     ICD-10-CM    1. Vertigo  R42       2. 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 Bateman:    Thank you for your recent referral of Di Payne. Please review the attached evaluation summary from Di's recent visit.     Please verify that you agree with the plan of care by signing the attached order.     If you have any questions or concerns, please do not hesitate to call.     I sincerely appreciate the opportunity to share in the care of one of your patients and hope to have another opportunity to work with you in the near future.       Sincerely,    Jefe Koo PT      Referring Provider:      I certify that I have read the below Plan of Care and certify the need for these services furnished under this plan of treatment while under my care.                    Cesar Bateman MD  [address] [place] [id number]  Via Fax: [phone]          {[facility] PT/OT NOTE TYPE:70197}    Today's date: 2024  Patient name: Di Payne  : 1975  MRN: [id number]  Referring provider: Cesar Bateman MD  Dx:   Encounter Diagnosis     ICD-10-CM    1. Vertigo  R42                      Assessment  Assessment details: Di Payne is a 48 y.o. female who presents with balance dysfunction and dizziness. Due to these impairments, patient has difficulty performing ambulation, transfers. Patient's clinical presentation is consistent with their referring diagnosis of Vertigo  (primary encounter diagnosis)    Unspecified vertiginous syndromes and labyrinthine disorders  . Patient has been educated in home exercise program and plan of care. Patient would benefit from skilled physical therapy services to address their aforementioned functional limitations and progress towards prior level of function and  independence with home exercise program.     Impairments: abnormal muscle firing, abnormal or restricted ROM, activity intolerance, impaired physical strength, lacks appropriate home exercise program and pain with function  Barriers to therapy: Chronicity of Sx's  Understanding of Dx/Px/POC: good   Prognosis: good    Goals  Short Term Goals:    1. Initiate and advance HEP  2. OOB without increased Sx's  3. Roll in bed      Plan  Patient would benefit from: skilled PT  Planned modality interventions: cryotherapy, electrical stimulation/Russian stimulation and thermotherapy: hydrocollator packs  Planned therapy interventions: joint mobilization, manual therapy, patient education, postural training, activity modification, abdominal trunk stabilization, body mechanics training, flexibility, functional ROM exercises, graded exercise, home exercise program, neuromuscular re-education, strengthening, stretching, therapeutic activities, therapeutic exercise, motor coordination training, muscle pump exercises, gait training, balance/weight bearing training and ADL training  Frequency: 2x week  Duration in weeks: 8  Treatment plan discussed with: patient      Subjective Evaluation    History of Present Illness  Mechanism of injury: Pt reports feeling like she was spinning when she got up from bed this morning.  Pt called PT, she already had a referral.  Patient Goals  Patient goals for therapy: improved balance    Pain  Quality: nausea.  Progression: worsening    Treatments  Previous treatment: physical therapy and medication  Current treatment: medication and physical therapy    Objective     Active Range of Motion   Cervical/Thoracic Spine       Cervical    Flexion: 67 degrees   Extension: 46 degrees      Left lateral flexion: 36 degrees      Right lateral flexion: 48 degrees      Left rotation: 68 degrees  Right rotation: 71 degrees       Tests     Additional Tests Details  Sammi Nisa: (+) Nastagmus to Left                Precautions: Meniere's Dx      Manuals 4/11        Epley Maneuver 3x                                   Neuro Re-Ed                                                                        Ther Ex                                                                                 Ther Activity                           Gait Training                           Modalities

## 2024-04-11 NOTE — PROGRESS NOTES
PT Evaluation     Today's date: 2024  Patient name: Di Payne  : 1975  MRN: [id number]  Referring provider: Cesar Bateman MD  Dx:   Encounter Diagnosis     ICD-10-CM    1. Vertigo  R42                      Assessment  Assessment details: Di Payne is a 48 y.o. female who presents with balance dysfunction and dizziness. Due to these impairments, patient has difficulty performing ambulation, transfers. Patient's clinical presentation is consistent with their referring diagnosis of Vertigo  (primary encounter diagnosis)    Unspecified vertiginous syndromes and labyrinthine disorders  . Patient has been educated in home exercise program and plan of care. Patient would benefit from skilled physical therapy services to address their aforementioned functional limitations and progress towards prior level of function and independence with home exercise program.     Impairments: abnormal muscle firing, abnormal or restricted ROM, activity intolerance, impaired physical strength, lacks appropriate home exercise program and pain with function  Barriers to therapy: Chronicity of Sx's  Understanding of Dx/Px/POC: good   Prognosis: good    Goals  Short Term Goals:    1. Initiate and advance HEP  2. OOB without increased Sx's  3. Roll in bed      Plan  Patient would benefit from: skilled PT  Planned modality interventions: cryotherapy, electrical stimulation/Russian stimulation and thermotherapy: hydrocollator packs  Planned therapy interventions: joint mobilization, manual therapy, patient education, postural training, activity modification, abdominal trunk stabilization, body mechanics training, flexibility, functional ROM exercises, graded exercise, home exercise program, neuromuscular re-education, strengthening, stretching, therapeutic activities, therapeutic exercise, motor coordination training, muscle pump exercises, gait training, balance/weight bearing training and ADL  training  Frequency: 2x week  Duration in weeks: 8  Treatment plan discussed with: patient      Subjective Evaluation    History of Present Illness  Mechanism of injury: Pt reports feeling like she was spinning when she got up from bed this morning.  Pt called PT, she already had a referral.  Patient Goals  Patient goals for therapy: improved balance    Pain  Quality: nausea.  Progression: worsening    Treatments  Previous treatment: physical therapy and medication  Current treatment: medication and physical therapy    Objective     Active Range of Motion   Cervical/Thoracic Spine       Cervical    Flexion: 67 degrees   Extension: 46 degrees      Left lateral flexion: 36 degrees      Right lateral flexion: 48 degrees      Left rotation: 68 degrees  Right rotation: 71 degrees       Tests     Additional Tests Details  Sammi Hallpike: (+) Nastagmus to Left               Precautions: Meniere's Dx      Manuals 4/11        Epley Maneuver 3x                                   Neuro Re-Ed                                                                        Ther Ex                                                                                 Ther Activity                           Gait Training                           Modalities

## 2024-04-12 ENCOUNTER — OFFICE VISIT (OUTPATIENT)
Dept: PHYSICAL THERAPY | Facility: CLINIC | Age: 49
End: 2024-04-12
Payer: COMMERCIAL

## 2024-04-12 DIAGNOSIS — R42 VERTIGO: Primary | ICD-10-CM

## 2024-04-12 DIAGNOSIS — H81.90 UNSPECIFIED VERTIGINOUS SYNDROMES AND LABYRINTHINE DISORDERS: ICD-10-CM

## 2024-04-12 PROCEDURE — 97140 MANUAL THERAPY 1/> REGIONS: CPT

## 2024-04-12 PROCEDURE — 97110 THERAPEUTIC EXERCISES: CPT

## 2024-04-12 NOTE — PROGRESS NOTES
"Daily Note     Today's date: 2024  Patient name: Di JI   : 1975  MRN: [id number]  Referring provider: Cesar Bateman MD  Dx:   Encounter Diagnosis     ICD-10-CM    1. Vertigo  R42       2. Unspecified vertiginous syndromes and labyrinthine disorders  H81.90           Start Time: 904  Stop Time: 955  Total time in clinic (min): 51 minutes    Subjective: Alma Delia reported that her vertiginous symptoms are much better today after being treated yesterday. She has not returned to her baseline, but she has less imbalance. Alma Delia explained that her vertiginous symptoms include head fog, and she did have difficulty partaking in conversation yesterday. She noted later in the visit that, when she lays on her left side, she has clear fluid that drains out of her ear.       Objective: See treatment diary below      Assessment: Alma Delia tolerated treatment well. She exhibited a (+) Sammi-Hallpike to the left with a torsional nystagmus observed. After the first Epley maneuver, she reported a 2/10 dizziness, which she explained is more of an internal sense of imbalance. She noted that she had a \"floater\" in her left eye during saccade exercises, which she stated is normal for her in settings with bright lights. The floater diminished once the lights were turned off. A discussion was held about seeing an ENT concerning her chronic aural fullness and drainage from the left ear, and she agreed that she would consider scheduling an appointment. Alma Delia demonstrated fatigue post treatment and would benefit from continued PT to reduce her vertiginous symptoms and optimize her return to her PLOF.       Plan: Continue per plan of care.      Precautions: Meniere's Dx    Manuals        [place]-Hallpike Test (+) L (+) L   2 seconds  Torsional Nystagmus       Epley Maneuver Performed Performed                Neuro Re-Ed         Patient Education/  HEP Review  8 min       VOR1 (Horizontal/Vertical)  2x20\" each     " "  Two-Point Saccades (Horizontal/Vertical)  3x20\" each       Sharpened Romberg         Tandem         NBOS w/ Head Turns         Ambulation w/ Head Turns         Ambulation w/ Head Nods         Karaokes         Cone Taps                  Ther Ex         Treadmill         Marches         Side Steps                  Ther Activity          FWD Bending w/ Rings         LAT Ring Toss Between Tables         Ambulation w/ Pivot Turns         Card Activity                       "

## 2024-04-15 ENCOUNTER — OFFICE VISIT (OUTPATIENT)
Dept: PHYSICAL THERAPY | Facility: CLINIC | Age: 49
End: 2024-04-15
Payer: COMMERCIAL

## 2024-04-15 DIAGNOSIS — R42 VERTIGO: Primary | ICD-10-CM

## 2024-04-15 PROCEDURE — 97140 MANUAL THERAPY 1/> REGIONS: CPT

## 2024-04-15 PROCEDURE — 97110 THERAPEUTIC EXERCISES: CPT

## 2024-04-15 NOTE — PROGRESS NOTES
"Daily Note     Today's date: 4/15/2024  Patient name: Di JI   : 1975  MRN: [id number]  Referring provider: Cesar Bateman MD  Dx:   Encounter Diagnosis     ICD-10-CM    1. Vertigo  R42           Start Time: 702  Stop Time: 747  Total time in clinic (min): 45 minutes    Subjective: Alma Delia reported that she is feeling better, noting that she is having the spinning symptoms rather internal movement issues. She thinks this is being caused by her vestibular migraines.      Objective: See treatment diary below      Assessment: Alma Delia tolerated treatment well. She exhibited a (-) Sammi-Hallpike Test to the left, and her current symptoms are not indicative of BPPV. She noted \"popping\" and \"cracking\" in her left ear during vertical VOR1 in sitting, and that her left ear was \"acting up\" during horizontal VOR1. There was an audible clicking noted during head nods on foam, and the source was unable to be determined. No TTP was noted in the cervical spine lateral aspect of the neck, or external aspects of the left ear, and her palpation of her cervical spine yielded no abnormalities with regard to joint positioning. She was advised to contact an ENT to determine whether internal pathology may be contributing to her persistent imbalance and symptoms. Alma Delia exhibited good technique with therapeutic exercises and would benefit from continued PT to reduce her imbalance and activity intolerance.       Plan: Continue per plan of care.      Precautions: Meniere's Dx    Manuals 4/11 4/12 4/15      [place]-Hallpike Test (+) L (+) L   2 seconds  Torsional Nystagmus (-) L        Epley Maneuver Performed Performed Performed               Neuro Re-Ed         Patient Education/  HEP Review  8 min       VOR1 (Horizontal/Vertical)  2x20\" each 3x20\" each      Two-Point Saccades (Horizontal/Vertical)  3x20\" each 2x20\" each      Romberg Stance w/ Head Turns/Nods   3x20 each  Foam      Ball Toss at Wall w/ Various LEONEL   20x " each  Rom/SR      NBOS w/ Head Turns         Ambulation w/ Head Turns         Ambulation w/ Head Nods         Karaokes         Cone Taps                  Ther Ex         Treadmill         Marches         Side Steps                  Ther Activity          FWD Bending w/ Rings         LAT Ring Toss Between Tables         Ambulation w/ Pivot Turns         Card Activity

## 2024-04-16 ENCOUNTER — APPOINTMENT (OUTPATIENT)
Dept: PHYSICAL THERAPY | Facility: CLINIC | Age: 49
End: 2024-04-16
Payer: COMMERCIAL

## 2024-04-19 ENCOUNTER — OFFICE VISIT (OUTPATIENT)
Dept: PHYSICAL THERAPY | Facility: CLINIC | Age: 49
End: 2024-04-19
Payer: COMMERCIAL

## 2024-04-19 DIAGNOSIS — H81.90 UNSPECIFIED VERTIGINOUS SYNDROMES AND LABYRINTHINE DISORDERS: ICD-10-CM

## 2024-04-19 DIAGNOSIS — R42 VERTIGO: Primary | ICD-10-CM

## 2024-04-19 PROCEDURE — 97140 MANUAL THERAPY 1/> REGIONS: CPT

## 2024-04-19 PROCEDURE — 97112 NEUROMUSCULAR REEDUCATION: CPT

## 2024-04-19 PROCEDURE — 97530 THERAPEUTIC ACTIVITIES: CPT

## 2024-04-19 NOTE — PROGRESS NOTES
"Daily Note     Today's date: 2024  Patient name: Di JI   : 1975  MRN: [id number]  Referring provider: Cesar Bateman MD  Dx:   Encounter Diagnosis     ICD-10-CM    1. Vertigo  R42       2. Unspecified vertiginous syndromes and labyrinthine disorders  H81.90           Start Time: 0703  Stop Time: 0750  Total time in clinic (min): 47 minutes    Subjective: Alma Delia reported that she slept on her left side last night knowing that she \"could be fixed\" at PT if she messed it up. Fluid was present in her ear when she woke up, and she continues to have pressure in the ear. Alma Delia has an appointment with an ENT on Tuesday, 2024. She is currently experiencing a vestibular headache, but she did take Lasix, Claritin, and Ativan for her symptoms.       Objective: See treatment diary below      Assessment: Alma Delia tolerated treatment well. She continues to exhibit a (-) Sammi-Hallpike Test to the left, and her symptoms are indicative of a vestibular disorder as opposed to BPPV. Dual-tasking activities were incorporated, and she noted symptoms concurrently that reduced upon cessation of the activity. She was encouraged to continue performing her HEP as advised, and she could gradually incorporate dual-tasking actviities, such as walking and looking at her phone. Alma Delia exhibited good technique with therapeutic exercises and would benefit from continued PT to improve her overall function through habitation for her vestibular symptoms.       Plan: Continue per plan of care.      Precautions: Meniere's Dx    Manuals 4/11 4/12 4/15 4/19     [place]-Hallpike Test (+) L (+) L   2 seconds  Torsional Nystagmus (-) L   (-) L     Epley Maneuver Performed Performed Performed Performed              Neuro Re-Ed         VOR1 (Horizontal/Vertical)  2x20\" each 3x20\" each 3x20\" each     Two-Point Saccades (Horizontal/Vertical)  3x20\" each 2x20\" each 4x20 each  Standing  Diagonal     Romberg Stance w/ Head Turns/Nods   3x20 " "each  Foam 3x20\" each  Foam     Ball Toss at Wall w/ Various LEONEL   20x each  Rom/SR 2x20 each  SR     NBOS w/ Head Turns         Ambulation w/ Head Turns         Ambulation w/ Head Nods         Karaokes         Cone Taps                  Ther Ex         Treadmill         Marches         Side Steps                  Ther Activity          FWD Bending w/ Rings         LAT Ring Toss Between Tables         Ambulation w/ Ball Toss    4 min     Card Activity    20x                       "

## 2024-04-22 ENCOUNTER — OFFICE VISIT (OUTPATIENT)
Dept: PHYSICAL THERAPY | Facility: CLINIC | Age: 49
End: 2024-04-22
Payer: COMMERCIAL

## 2024-04-22 DIAGNOSIS — H81.90 UNSPECIFIED VERTIGINOUS SYNDROMES AND LABYRINTHINE DISORDERS: ICD-10-CM

## 2024-04-22 DIAGNOSIS — R42 VERTIGO: Primary | ICD-10-CM

## 2024-04-22 PROCEDURE — 97140 MANUAL THERAPY 1/> REGIONS: CPT

## 2024-04-22 NOTE — PROGRESS NOTES
"Daily Note     Today's date: 2024  Patient name: Di JI   : 1975  MRN: [id number]  Referring provider: Cesar Bateman MD  Dx:   Encounter Diagnosis     ICD-10-CM    1. Vertigo  R42       2. Unspecified vertiginous syndromes and labyrinthine disorders  H81.90           Start Time: 1634  Stop Time: 1720  Total time in clinic (min): 46 minutes    Subjective: Alma Delia reported that she is having a horrible day, and she is having difficulty focusing because of her vestibular headache. She took her medication about 1.5 hours ago, which helped her symptoms significantly. Alma Delia is scheduled to have an appointment with an ENT tomorrow morning.       Objective: See treatment diary below      Assessment: Alma Delia tolerated treatment fairly. Extensive manual therapy was performed to the cervical region, and she exhibited increased tone in the anterior scalene on the right. Pressure to the first rib elicited radiating symptoms into her head, increasing the severity of her headache. Only seated ocular exercises were performed to prevent exacerbation of her symptoms. Upon observation, she exhibited a horizontal gaze-evoked nystagmus to the left. She was encouraged to speak with the ENT about her persistent symptoms at her appointment tomorrow. Alma Delia would benefit from continued PT to optimize her ability to perform her usual occupational and recreational tasks with minimal exacerbation of her vertiginous symptoms.       Plan: Continue per plan of care.      Precautions: Meniere's Dx    Manuals 4/11 4/12 4/15 4/19 4/22    Sammi-Hallpike Test (+) L (+) L   2 seconds  Torsional Nystagmus (-) L   (-) L (-) L    Epley Maneuver Performed Performed Performed Performed     SOR/MFR of Cervical Paraspinals/Cervical Distratcion     30 min             Neuro Re-Ed         VOR1 (Horizontal/Vertical)  2x20\" each 3x20\" each 3x20\" each 3x20\" each    Two-Point Saccades (Horizontal/Vertical)  3x20\" each 2x20\" each 4x20 " "each  Standing  Diagonal 3x20 each  Seated    Romberg Stance w/ Head Turns/Nods   3x20 each  Foam 3x20\" each  Foam     Ball Toss at Wall w/ Various LEONEL   20x each  Rom/SR 2x20 each  SR     NBOS w/ Head Turns         Ambulation w/ Head Turns         Ambulation w/ Head Nods         Karaokes         Cone Taps                  Ther Ex         Treadmill         Marches         Side Steps                  Ther Activity          FWD Bending w/ Rings         LAT Ring Toss Between Tables         Ambulation w/ Ball Toss    4 min     Card Activity    20x                         "

## 2024-04-25 ENCOUNTER — OFFICE VISIT (OUTPATIENT)
Dept: PHYSICAL THERAPY | Facility: CLINIC | Age: 49
End: 2024-04-25
Payer: COMMERCIAL

## 2024-04-25 DIAGNOSIS — H81.90 UNSPECIFIED VERTIGINOUS SYNDROMES AND LABYRINTHINE DISORDERS: ICD-10-CM

## 2024-04-25 DIAGNOSIS — R42 VERTIGO: Primary | ICD-10-CM

## 2024-04-25 PROCEDURE — 97530 THERAPEUTIC ACTIVITIES: CPT

## 2024-04-25 PROCEDURE — 97112 NEUROMUSCULAR REEDUCATION: CPT

## 2024-04-25 NOTE — PROGRESS NOTES
"Daily Note     Today's date: 2024  Patient name: Di JI   : 1975  MRN: [id number]  Referring provider: Cesar Bateman MD  Dx:   Encounter Diagnosis     ICD-10-CM    1. Vertigo  R42       2. 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 She expressed frustration about her current functional limitations, and she would like to go on another cruise soon.       Objective: See treatment diary below      Assessment: Alma Delia tolerated treatment well. A discussion was held concerning her desire to go on a cruise, and she was encouraged to speak with her doctors about steps to take prior to the vacation to minimize her symptoms during and following the cruise. She was verbally cued to hinge at her hips while reaching outside her LEONEL, and she exhibited greater imbalance to the right compared to the left. Wobble board standing with perturbations was challenging for Alma Delia, and she was provided with CGA throughout the exercise to ensure safety. Caraokes were difficult without verbal cueing, and she was encouraged to perform these at home to improve her BLE coordination. Alma Delia would benefit from continued PT to improve her reactive and anticipatory balance and reduce her symptom irritability during static standing.      Plan: Continue per plan of care.      Precautions: Meniere's Dx    Manuals 4/11 4/12 4/15 4/19 4/22 4/24   [place]-Hallpike Test (+) L (+) L   2 seconds  Torsional Nystagmus (-) L   (-) L (-) L    Epley Maneuver Performed Performed Performed Performed     SOR/MFR of Cervical Paraspinals/Cervical Distratcion     30 min             Neuro Re-Ed         POC/Pt Education      10 min   VOR1 (Horizontal/Vertical)  2x20\" each 3x20\" each 3x20\" each 3x20\" each 3x20\" " "each   Two-Point Saccades (Horizontal/Vertical)  3x20\" each 2x20\" each 4x20 each  Standing  Diagonal 3x20 each  Seated 3x20  each  Seated   Ball Toss at Wall w/ Various LEONEL   20x each  Rom/SR 2x20 each  SR  2x30 each  SR   Reach Out of LEONEL (FWD/LAT)      15x each  Foam   NBOS w/ Head Turns and Reach      20x each  Foam   Wobble Board Standing w/ Perturbations (AP/LAT)      8 min   Ambulation w/ Head Turns         Ambulation w/ Head Nods         Caraokes      8x10ft   Cone Taps                  Ther Ex         Treadmill         Marches         Side Steps                  Ther Activity          FWD Bending w/ Rings         LAT Ring Toss Between Tables         Ambulation w/ Ball Toss    4 min  8 min   Card Activity    20x                           "

## 2024-04-29 ENCOUNTER — OFFICE VISIT (OUTPATIENT)
Dept: PHYSICAL THERAPY | Facility: CLINIC | Age: 49
End: 2024-04-29
Payer: COMMERCIAL

## 2024-04-29 DIAGNOSIS — R42 VERTIGO: Primary | ICD-10-CM

## 2024-04-29 DIAGNOSIS — H81.90 UNSPECIFIED VERTIGINOUS SYNDROMES AND LABYRINTHINE DISORDERS: ICD-10-CM

## 2024-04-29 PROCEDURE — 97530 THERAPEUTIC ACTIVITIES: CPT

## 2024-04-29 PROCEDURE — 97112 NEUROMUSCULAR REEDUCATION: CPT

## 2024-04-29 NOTE — PROGRESS NOTES
"Daily Note     Today's date: 2024  Patient name: Di JI   : 1975  MRN: [id number]  Referring provider: Cesar Bateman MD  Dx:   Encounter Diagnosis     ICD-10-CM    1. Vertigo  R42       2. 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       Objective: See treatment diary below      Assessment: Alma Delia tolerated treatment well. She exhibited improved balance when reaching outside of her LEONEL. Her VOR exercises were progressed to VOR2, and she noted that she felt similar symptoms as to when she has her mal de debarquement. She was encouraged to perform this activity at home to reduce her symptom severity and duration. Alma Delia exhibited good technique with therapeutic exercises and would benefit from continued PT to improve her tolerance for dynamic dual-tasking activities       Plan: Continue per plan of care.      Precautions: Meniere's Dx    Manuals 4/29  4/15 4/19 4/22 4/24   Sammi-Hallpike Test   (-) L   (-) L (-) L    Epley Maneuver   Performed Performed     SOR/MFR of Cervical Paraspinals/Cervical Distratcion     30 min             Neuro Re-Ed         VOR1 (Horizontal/Vertical)   3x20\" each 3x20\" each 3x20\" each 3x20\" each   VOR2  (Horizontal) 3x15\"        Two-Point Saccades (Horizontal/Vertical) 3x20  each  Standing Foam  2x20\" each 4x20 each  Standing  Diagonal 3x20 each  Seated 3x20  each  Seated   Ball Toss at Wall w/ Various LEONEL 2x20 each  Tandem  20x each  Rom/SR 2x20 each  SR  2x30 each  SR   Reach Out of LEONEL (FWD/LAT) 30x each  Foam     15x each  Foam   NBOS w/ Head Turns and Reach 15x each  Foam     20x each  Foam   Wobble Board Standing w/ Perturbations (AP/LAT) 6 min     8 min   Ambulation w/ Head Turns         Ambulation w/ Head Nods       "   Caraokes 6x10ft     8x10ft   Cone Taps 20x  Foam                 Ther Ex         Treadmill         Marches         Side Steps                  Ther Activity          FWD Bending w/ Rings         LAT Ring Toss Between Tables         Ambulation w/ Ball Toss 4 min   4 min  8 min   Card Activity 2x20   20x

## 2024-05-02 ENCOUNTER — OFFICE VISIT (OUTPATIENT)
Dept: PHYSICAL THERAPY | Facility: CLINIC | Age: 49
End: 2024-05-02
Payer: COMMERCIAL

## 2024-05-02 DIAGNOSIS — H81.90 UNSPECIFIED VERTIGINOUS SYNDROMES AND LABYRINTHINE DISORDERS: ICD-10-CM

## 2024-05-02 DIAGNOSIS — R42 VERTIGO: Primary | ICD-10-CM

## 2024-05-02 PROCEDURE — 97112 NEUROMUSCULAR REEDUCATION: CPT

## 2024-05-02 NOTE — PROGRESS NOTES
"Daily Note     Today's date: 2024  Patient name: Di JI   : 1975  MRN: [id number]  Referring provider: Cesar Bateman MD  Dx:   Encounter Diagnosis     ICD-10-CM    1. Vertigo  R42       2. 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 She has been taking her migraine medicine more often than usual, and she noted growing pressure in both of her ears. Alma Delia stated later in the visit that she does notice a positive difference with her symptoms following her visits.       Objective: See treatment diary below      Assessment: Alma Delia tolerated treatment well. She described seeing a \"white line\" in her left visual field during horizontal VOR2 exercises. Caraokes were progressed with a ball toss, and she demonstrated good postural control without LOB. She was encouraged to continue performing her HEP to maximize the carry-over from outpatient visits to home. Alma Delia exhibited good technique with therapeutic exercises and would benefit from continued PT to improve her balance and reduce her symptom sensitivity during dynamic activities.       Plan: Continue per plan of care.      Precautions: Meniere's Dx    Manuals    Sammi-Hallpike Test    (-) L (-) L    Epley Maneuver    Performed     SOR/MFR of Cervical Paraspinals/Cervical Distratcion     30 min             Neuro Re-Ed         VOR2  (Horizontal/Vertical) 3x15\" 3x20\" each       Two-Point Saccades (Horizontal/Vertical) 3x20  each  Standing Foam 3x20   each  Standing  Foam  4x20 each  Standing  Diagonal 3x20 each  Seated 3x20  each  Seated   Ball Toss at Wall w/ Various LEONEL 2x20 each  Tandem 4x20 each  Tandem  2x20 each  SR  2x30 each  SR   Reach Out of LEONEL (FWD/LAT) 30x each  Foam 30x each  Foam    15x each  Foam   NBOS w/ Head " Turns and Reach 15x each  Foam     20x each  Foam   Wobble Board Standing w/ Perturbations (AP/LAT) 6 min 5 min    8 min   Wobble Board Standing w/ Ball Throw (AP/LAT)  25x FWD  15x LAT  PGB        Ambulation w/ Head Turns         Ambulation w/ Head Nods         Caraokes 6x10ft 8x10ft w/ Ball Toss    8x10ft   Cone Taps 20x  Foam 15x each  2 Cones  Foam                Ther Ex         Treadmill         Marches         Side Steps                  Ther Activity          FWD Bending w/ Rings         LAT Ring Toss Between Tables         Ambulation w/ Ball Toss 4 min   4 min  8 min   Card Activity 2x20   20x

## 2024-05-03 NOTE — PROGRESS NOTES
"PT Re-Evaluation     Today's date: 2024  Patient name: Di Payne  : 1975  MRN: [id number]  Referring provider: Cesar Bateman MD  Dx:   Encounter Diagnosis     ICD-10-CM    1. Vertigo  R42       2. 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 She exhibited improved cervical AROM in all directions, and she these motions did not elicit symptoms. B/L (-) [place]-Hallpike and Roll Tests were observed, and she demonstrated ocular AROM that was WNL. 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 At this time, Alma Delia stated that she is able to manage her chronic vestibular dysfunction symptoms at home with the exercises from physical therapy and medication prescribed by her neurologist. Alma Delia was discharged from skilled physical therapy services, and she was provided with a comprehensive IHEP to assist in maintaining the cervical AROM, canal positioning, static/dynamic balance, and activity tolerance obtained through therapy. Time was allotted for questions and concerns, and these were answered to Alma Delia's satisfaction. She was advised to contact her referring provider or PCP with regard to any changes in status.     Symptom irritability: lowUnderstanding of Dx/Px/POC: good   Prognosis: good    Goals  Short-Term Goals (1-3 Weeks)  1.  Alma Delia will have a reduction in vertiginous symptoms by 50%. (Met)  2.  Alma Delia will roll over in bed without symptom exacerbation. (Met)  3.  Alma Delia will report that she no longer requires Meclizine. (Met)    Long-Term Goals (4-6 Weeks)  1.  Alma Delia will report a resolution of vertiginous symptoms to encourage a return to IADLs. " "(Met)  2.  Alma Delia will exhibit a (-) [place]-Hallpike test to demonstrate correction of otolith placement in the L posterior canal. (Met)  3.  Alma Delia will bend forward for 5 repetitions with minimal symptoms to show improved activity tolerance. (Partially Met)  4.  Alma Delia will achieve a FOTO score of 57 prior to discharge. (Not Met-54)    Plan  Plan details: Alma Delia reviewed and agreed with the POC.   Treatment plan discussed with: patient      Subjective Evaluation    History of Present Illness  Mechanism of injury: Alma Delia reported that her function has improved significantly since beginning physical therapy services. She is no longer experience a \"crystal problem\" in her left ear. She explained that her ears still feel full, but this is more of a chronic issue. Her chronic vestibular symptoms occur every day, and she has been using her rescue medicine more frequently (4 times/week), noting that she does not use it more than once per day. Alma Delia went to an ENT last month to determine whether pressure in her ear was increasing her vertigo issue, but she was told that her problem was not coming from the ear canal at this time. Alma Delia is very consistent with her exercises at home, and she feels like the eye ones \"ground her.\" She stated that she feels well enough to discontinue outpatient physical therapy depending on her evaluation.          Recurrent probem    Patient Goals  Patient goals for therapy: improved balance and return to sport/leisure activities    Pain  No pain reported  Progression: improved    Social Support  Lives with: spouse    Employment status: working  Treatments  Previous treatment: physical therapy and medication  Current treatment: medication and physical therapy      Objective     Concurrent Complaints  Positive for headaches, nausea/motion sickness, tinnitus (sometimes), aural fullness and poor concentration (sometimes). Negative for visual change, hearing loss, memory loss and peripheral " neuropathy    Active Range of Motion   Cervical/Thoracic Spine       Cervical    Flexion: 70 degrees   Extension: 54 degrees      Left lateral flexion: 50 degrees      Right lateral flexion: 55 degrees      Left rotation: 73 degrees  Right rotation: 70 degrees       Tests     Additional Tests Details  Sammi Hallpike: (+) Nastagmus to Left      Neuro Exam:     Dizziness  Positive for disequilibrium, vertigo, light-headedness, rocking or swaying and floating or swimming.   Negative for oscillopsia, motion sickness and diplopia.     Exacerbating factors  Positive for bending over, rolling in bed (sometimes), looking up (sometimes) and turning head (sometimes).   Negative for walking, supine to/from sitting, optokinetic movement and walking in busy environment.     Symptoms   Duration: all day  Frequency: 4 times/week  Intensity at best: 0/10  Intensity at worst: 4/10  Average intensity: 4/10    Headaches   Patient reports headaches: Yes.   Frequency: daily  Duration: all day unless she takes her medicine  Intensity at best: 0/10  Intensity at worst: 4/10  Average intensity: 4/10    Oculomotor exam   Oculomotor ROM: WNL  Gaze holding nystagmus: not present left  and not present right  Smooth pursuits: within normal limits  Vertical saccades: normal  Horizontal saccades: normal  Convergence: normal  Head thrust: left normal  Head thrust: right abnormal    Positional testing   Sammi-Hallpike   Left posterior canal: WNL  Right posterior canal: WNL  Roll test   Left horizontal canal: WNL  Right horizontal canal: WNL      Balance assessments   MCTSIB   Eyes open level surface: 30  Eyes open foam surface: 30  Eyes closed level surface: 30  Eyes closed foam surface: 30    Flowsheet Rows      Flowsheet Row Most Recent Value   PT/OT G-Codes    Current Score 46   Projected Score 0               Precautions: Meniere's Dx    Manuals 4/29 5/2 5/7 4/22 4/24   [place]-Hallpike Test   (-) B/L  (-) L    Roll Test   (-) B/L      Epley Maneuver   "       SOR/MFR of Cervical Paraspinals/Cervical Distratcion     30 min             Neuro Re-Ed         VOR2  (Horizontal/Vertical) 3x15\" 3x20\" each       Two-Point Saccades (Horizontal/Vertical) 3x20  each  Standing Foam 3x20   each  Standing  Foam   3x20 each  Seated 3x20  each  Seated   Ball Toss at Wall w/ Various LEONEL 2x20 each  Tandem 4x20 each  Tandem    2x30 each  SR   Reach Out of LEONEL (FWD/LAT) 30x each  Foam 30x each  Foam    15x each  Foam   NBOS w/ Head Turns and Reach 15x each  Foam     20x each  Foam   Wobble Board Standing w/ Perturbations (AP/LAT) 6 min 5 min    8 min   Wobble Board Standing w/ Ball Throw (AP/LAT)  25x FWD  15x LAT  PGB        Ambulation w/ Head Turns         Ambulation w/ Head Nods         Caraokes 6x10ft 8x10ft w/ Ball Toss    8x10ft   Cone Taps 20x  Foam 15x each  2 Cones  Foam       CTSIB   Performed               Ther Ex         Treadmill         Marches         Side Steps                  Ther Activity          FWD Bending w/ Rings         LAT Ring Toss Between Tables         Ambulation w/ Ball Toss 4 min     8 min   Card Activity 2x20                        "

## 2024-05-07 ENCOUNTER — EVALUATION (OUTPATIENT)
Dept: PHYSICAL THERAPY | Facility: CLINIC | Age: 49
End: 2024-05-07
Payer: COMMERCIAL

## 2024-05-07 DIAGNOSIS — H81.90 UNSPECIFIED VERTIGINOUS SYNDROMES AND LABYRINTHINE DISORDERS: ICD-10-CM

## 2024-05-07 DIAGNOSIS — R42 VERTIGO: Primary | ICD-10-CM

## 2024-05-07 PROCEDURE — 97140 MANUAL THERAPY 1/> REGIONS: CPT

## 2024-05-07 PROCEDURE — 97112 NEUROMUSCULAR REEDUCATION: CPT

## 2024-05-07 NOTE — LETTER
"May 7, 2024    Cesar Bateman MD  [address] [place] [address]    Patient: Di Payne   YOB: 1975   Date of Visit: 2024     Encounter Diagnosis     ICD-10-CM    1. Vertigo  R42       2. 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 Bateman:    Thank you for your recent referral of Di Payne. Please review the attached evaluation summary from Di's recent visit.     Please verify that you agree with the plan of care by signing the attached order.     If you have any questions or concerns, please do not hesitate to call.     I sincerely appreciate the opportunity to share in the care of one of your patients and hope to have another opportunity to work with you in the near future.       Sincerely,    Mahsa Clayton, PT      Referring Provider:      I certify that I have read the below Plan of Care and certify the need for these services furnished under this plan of treatment while under my care.                    Cesar Bateman MD  [address] [place] [address]  Via Fax: [phone]          PT Re-Evaluation     Today's date: 2024  Patient name: Di Payne  : 1975  MRN: [id number]  Referring provider: Cesar Bateman MD  Dx:   Encounter Diagnosis     ICD-10-CM    1. Vertigo  R42       2. 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 She exhibited improved cervical AROM in all directions, and she these motions did not elicit symptoms. B/L (-) Sammi-Hallpike and Roll Tests were observed, and she demonstrated ocular AROM that was WNL. An abnormal Dynamic Head Thrust Test was exhibited to the R, which could be attributed to her prior injury to the " "vestibulocochlear nerve. At this time, Alma Delia stated that she is able to manage her chronic vestibular dysfunction symptoms at home with the exercises from physical therapy and medication prescribed by her neurologist. Alma Delia was discharged from skilled physical therapy services, and she was provided with a comprehensive IHEP to assist in maintaining the cervical AROM, canal positioning, static/dynamic balance, and activity tolerance obtained through therapy. Time was allotted for questions and concerns, and these were answered to Alma Delia's satisfaction. She was advised to contact her referring provider or PCP with regard to any changes in status.     Symptom irritability: lowUnderstanding of Dx/Px/POC: good   Prognosis: good    Goals  Short-Term Goals (1-3 Weeks)  1.  Alma Delia will have a reduction in vertiginous symptoms by 50%. (Met)  2.  Alma Delia will roll over in bed without symptom exacerbation. (Met)  3.  Alma Delia will report that she no longer requires Meclizine. (Met)    Long-Term Goals (4-6 Weeks)  1.  Alma eDlia will report a resolution of vertiginous symptoms to encourage a return to IADLs. (Met)  2.  Alma Delia will exhibit a (-) Sammi-Hallpike test to demonstrate correction of otolith placement in the L posterior canal. (Met)  3.  Alma Delia will bend forward for 5 repetitions with minimal symptoms to show improved activity tolerance. (Partially Met)  4.  Alma Delia will achieve a FOTO score of 57 prior to discharge. (Not Met-54)    Plan  Plan details: Alma Delia reviewed and agreed with the POC.   Treatment plan discussed with: patient      Subjective Evaluation    History of Present Illness  Mechanism of injury: Alma Delia reported that her function has improved significantly since beginning physical therapy services. She is no longer experience a \"crystal problem\" in her left ear. She explained that her ears still feel full, but this is more of a chronic issue. Her chronic vestibular symptoms occur every day, and she has been using her rescue " "medicine more frequently (4 times/week), noting that she does not use it more than once per day. Alma Delia went to an ENT last month to determine whether pressure in her ear was increasing her vertigo issue, but she was told that her problem was not coming from the ear canal at this time. Alma Delia is very consistent with her exercises at home, and she feels like the eye ones \"ground her.\" She stated that she feels well enough to discontinue outpatient physical therapy depending on her evaluation.          Recurrent probem    Patient Goals  Patient goals for therapy: improved balance and return to sport/leisure activities    Pain  No pain reported  Progression: improved    Social Support  Lives with: spouse    Employment status: working  Treatments  Previous treatment: physical therapy and medication  Current treatment: medication and physical therapy      Objective     Concurrent Complaints  Positive for headaches, nausea/motion sickness, tinnitus (sometimes), aural fullness and poor concentration (sometimes). Negative for visual change, hearing loss, memory loss and peripheral neuropathy    Active Range of Motion   Cervical/Thoracic Spine       Cervical    Flexion: 70 degrees   Extension: 54 degrees      Left lateral flexion: 50 degrees      Right lateral flexion: 55 degrees      Left rotation: 73 degrees  Right rotation: 70 degrees       Tests     Additional Tests Details  [place] Hallpike: (+) Nastagmus to Left      Neuro Exam:     Dizziness  Positive for disequilibrium, vertigo, light-headedness, rocking or swaying and floating or swimming.   Negative for oscillopsia, motion sickness and diplopia.     Exacerbating factors  Positive for bending over, rolling in bed (sometimes), looking up (sometimes) and turning head (sometimes).   Negative for walking, supine to/from sitting, optokinetic movement and walking in busy environment.     Symptoms   Duration: all day  Frequency: 4 times/week  Intensity at best: 0/10  Intensity " "at worst: 4/10  Average intensity: 4/10    Headaches   Patient reports headaches: Yes.   Frequency: daily  Duration: all day unless she takes her medicine  Intensity at best: 0/10  Intensity at worst: 4/10  Average intensity: 4/10    Oculomotor exam   Oculomotor ROM: WNL  Gaze holding nystagmus: not present left  and not present right  Smooth pursuits: within normal limits  Vertical saccades: normal  Horizontal saccades: normal  Convergence: normal  Head thrust: left normal  Head thrust: right abnormal    Positional testing   [place]-Hallpike   Left posterior canal: WNL  Right posterior canal: WNL  Roll test   Left horizontal canal: WNL  Right horizontal canal: WNL      Balance assessments   MCTSIB   Eyes open level surface: 30  Eyes open foam surface: 30  Eyes closed level surface: 30  Eyes closed foam surface: 30    Flowsheet Rows      Flowsheet Row Most Recent Value   PT/OT G-Codes    Current Score 46   Projected Score 0               Precautions: Meniere's Dx    Manuals 4/29 5/2 5/7 4/22 4/24   Sammi-Hallpike Test   (-) B/L  (-) L    Roll Test   (-) B/L      Epley Maneuver         SOR/MFR of Cervical Paraspinals/Cervical Distratcion     30 min             Neuro Re-Ed         VOR2  (Horizontal/Vertical) 3x15\" 3x20\" each       Two-Point Saccades (Horizontal/Vertical) 3x20  each  Standing Foam 3x20   each  Standing  Foam   3x20 each  Seated 3x20  each  Seated   Ball Toss at Wall w/ Various LEONEL 2x20 each  Tandem 4x20 each  Tandem    2x30 each  SR   Reach Out of LEONEL (FWD/LAT) 30x each  Foam 30x each  Foam    15x each  Foam   NBOS w/ Head Turns and Reach 15x each  Foam     20x each  Foam   Wobble Board Standing w/ Perturbations (AP/LAT) 6 min 5 min    8 min   Wobble Board Standing w/ Ball Throw (AP/LAT)  25x FWD  15x LAT  PGB        Ambulation w/ Head Turns         Ambulation w/ Head Nods         Caraokes 6x10ft 8x10ft w/ Ball Toss    8x10ft   Cone Taps 20x  Foam 15x each  2 Cones  Foam       CTSIB   Performed             "   Ther Ex         Treadmill         Marches         Side Steps                  Ther Activity          FWD Bending w/ Rings         LAT Ring Toss Between Tables         Ambulation w/ Ball Toss 4 min     8 min   Card Activity 2x20

## 2024-05-09 ENCOUNTER — APPOINTMENT (OUTPATIENT)
Dept: PHYSICAL THERAPY | Facility: CLINIC | Age: 49
End: 2024-05-09
Payer: COMMERCIAL

## 2024-05-22 ENCOUNTER — APPOINTMENT (OUTPATIENT)
Dept: PHYSICAL THERAPY | Facility: CLINIC | Age: 49
End: 2024-05-22
Payer: COMMERCIAL

## 2024-05-24 ENCOUNTER — APPOINTMENT (OUTPATIENT)
Dept: PHYSICAL THERAPY | Facility: CLINIC | Age: 49
End: 2024-05-24
Payer: COMMERCIAL

## 2024-05-28 ENCOUNTER — APPOINTMENT (OUTPATIENT)
Dept: PHYSICAL THERAPY | Facility: CLINIC | Age: 49
End: 2024-05-28
Payer: COMMERCIAL

## 2024-05-31 ENCOUNTER — APPOINTMENT (OUTPATIENT)
Dept: PHYSICAL THERAPY | Facility: CLINIC | Age: 49
End: 2024-05-31
Payer: COMMERCIAL

## 2024-07-30 ENCOUNTER — HOSPITAL ENCOUNTER (OUTPATIENT)
Dept: CT IMAGING | Facility: HOSPITAL | Age: 49
Discharge: HOME | End: 2024-07-30
Payer: COMMERCIAL

## 2024-07-30 DIAGNOSIS — R91.8 OTHER NONSPECIFIC ABNORMAL FINDING OF LUNG FIELD: ICD-10-CM

## 2024-07-30 PROCEDURE — 71250 CT THORAX DX C-: CPT

## 2024-08-07 ENCOUNTER — APPOINTMENT (RX ONLY)
Dept: URBAN - NONMETROPOLITAN AREA CLINIC 4 | Facility: CLINIC | Age: 49
Setting detail: DERMATOLOGY
End: 2024-08-07

## 2024-08-07 DIAGNOSIS — L57.8 OTHER SKIN CHANGES DUE TO CHRONIC EXPOSURE TO NONIONIZING RADIATION: ICD-10-CM

## 2024-08-07 DIAGNOSIS — D22 MELANOCYTIC NEVI: ICD-10-CM

## 2024-08-07 DIAGNOSIS — L82.1 OTHER SEBORRHEIC KERATOSIS: ICD-10-CM

## 2024-08-07 DIAGNOSIS — L82.0 INFLAMED SEBORRHEIC KERATOSIS: ICD-10-CM

## 2024-08-07 DIAGNOSIS — D18.0 HEMANGIOMA: ICD-10-CM

## 2024-08-07 DIAGNOSIS — L81.4 OTHER MELANIN HYPERPIGMENTATION: ICD-10-CM

## 2024-08-07 DIAGNOSIS — L56.5 DISSEMINATED SUPERFICIAL ACTINIC POROKERATOSIS (DSAP): ICD-10-CM

## 2024-08-07 PROBLEM — D22.5 MELANOCYTIC NEVI OF TRUNK: Status: ACTIVE | Noted: 2024-08-07

## 2024-08-07 PROBLEM — D18.01 HEMANGIOMA OF SKIN AND SUBCUTANEOUS TISSUE: Status: ACTIVE | Noted: 2024-08-07

## 2024-08-07 PROBLEM — D22.61 MELANOCYTIC NEVI OF RIGHT UPPER LIMB, INCLUDING SHOULDER: Status: ACTIVE | Noted: 2024-08-07

## 2024-08-07 PROBLEM — D23.72 OTHER BENIGN NEOPLASM OF SKIN OF LEFT LOWER LIMB, INCLUDING HIP: Status: ACTIVE | Noted: 2024-08-07

## 2024-08-07 PROCEDURE — 17110 DESTRUCTION B9 LES UP TO 14: CPT

## 2024-08-07 PROCEDURE — ? TREATMENT REGIMEN

## 2024-08-07 PROCEDURE — 17003 DESTRUCT PREMALG LES 2-14: CPT | Mod: 59

## 2024-08-07 PROCEDURE — ? COUNSELING

## 2024-08-07 PROCEDURE — ? FULL BODY SKIN EXAM

## 2024-08-07 PROCEDURE — ? SUNSCREEN RECOMMENDATIONS

## 2024-08-07 PROCEDURE — 99213 OFFICE O/P EST LOW 20 MIN: CPT | Mod: 25

## 2024-08-07 PROCEDURE — ? LIQUID NITROGEN

## 2024-08-07 PROCEDURE — 17000 DESTRUCT PREMALG LESION: CPT | Mod: 59

## 2024-08-07 ASSESSMENT — LOCATION SIMPLE DESCRIPTION DERM
LOCATION SIMPLE: RIGHT FOREHEAD
LOCATION SIMPLE: LEFT PRETIBIAL REGION
LOCATION SIMPLE: UPPER BACK
LOCATION SIMPLE: RIGHT UPPER BACK
LOCATION SIMPLE: RIGHT LOWER BACK
LOCATION SIMPLE: LEFT CHEEK
LOCATION SIMPLE: RIGHT CHEEK
LOCATION SIMPLE: RIGHT FOREARM
LOCATION SIMPLE: LEFT THIGH
LOCATION SIMPLE: RIGHT PRETIBIAL REGION
LOCATION SIMPLE: LEFT UPPER BACK

## 2024-08-07 ASSESSMENT — LOCATION ZONE DERM
LOCATION ZONE: FACE
LOCATION ZONE: LEG
LOCATION ZONE: TRUNK
LOCATION ZONE: ARM

## 2024-08-07 ASSESSMENT — LOCATION DETAILED DESCRIPTION DERM
LOCATION DETAILED: SUPERIOR THORACIC SPINE
LOCATION DETAILED: RIGHT LATERAL PROXIMAL PRETIBIAL REGION
LOCATION DETAILED: LEFT MEDIAL UPPER BACK
LOCATION DETAILED: RIGHT INFERIOR MEDIAL MALAR CHEEK
LOCATION DETAILED: LEFT DISTAL PRETIBIAL REGION
LOCATION DETAILED: LEFT INFERIOR CENTRAL MALAR CHEEK
LOCATION DETAILED: LEFT INFERIOR UPPER BACK
LOCATION DETAILED: LEFT ANTERIOR LATERAL DISTAL THIGH
LOCATION DETAILED: RIGHT INFERIOR MEDIAL FOREHEAD
LOCATION DETAILED: RIGHT MID-UPPER BACK
LOCATION DETAILED: LEFT ANTERIOR MEDIAL DISTAL THIGH
LOCATION DETAILED: RIGHT INFERIOR MEDIAL MIDBACK
LOCATION DETAILED: RIGHT PROXIMAL DORSAL FOREARM

## 2024-08-07 ASSESSMENT — PAIN INTENSITY VAS: HOW INTENSE IS YOUR PAIN 0 BEING NO PAIN, 10 BEING THE MOST SEVERE PAIN POSSIBLE?: NO PAIN

## 2024-08-07 NOTE — PROCEDURE: LIQUID NITROGEN
Duration Of Freeze Thaw-Cycle (Seconds): 3
Post-Care Instructions: I reviewed with the patient in detail post-care instructions. Patient is to wear sunprotection, and avoid picking at any of the treated lesions. Pt may apply Vaseline to crusted or scabbing areas.
Render Note In Bullet Format When Appropriate: No
Application Tool (Optional): Cry-AC
Show Applicator Variable?: Yes
Consent: The patient's consent was obtained including but not limited to risks of crusting, scabbing, blistering, scarring, darker or lighter pigmentary change, recurrence, incomplete removal and infection.
Detail Level: Zone
Number Of Freeze-Thaw Cycles: 1 freeze-thaw cycle
Spray Paint Text: The liquid nitrogen was applied to the skin utilizing a spray paint frosting technique.
Medical Necessity Information: It is in your best interest to select a reason for this procedure from the list below. All of these items fulfill various CMS LCD requirements except the new and changing color options.
Number Of Freeze-Thaw Cycles: 3 freeze-thaw cycles
Medical Necessity Clause: This procedure was medically necessary because the lesions that were treated were:
Detail Level: Detailed

## 2024-08-12 DIAGNOSIS — Z30.41 ENCOUNTER FOR SURVEILLANCE OF CONTRACEPTIVE PILLS: ICD-10-CM

## 2024-08-13 RX ORDER — NORETHINDRONE ACETATE AND ETHINYL ESTRADIOL 1; 20 MG/1; UG/1
TABLET ORAL
Qty: 84 TABLET | Refills: 1 | Status: SHIPPED | OUTPATIENT
Start: 2024-08-13

## 2024-09-09 ENCOUNTER — OFFICE VISIT (OUTPATIENT)
Dept: OBGYN CLINIC | Facility: CLINIC | Age: 49
End: 2024-09-09
Payer: COMMERCIAL

## 2024-09-09 VITALS
HEIGHT: 63 IN | DIASTOLIC BLOOD PRESSURE: 74 MMHG | SYSTOLIC BLOOD PRESSURE: 116 MMHG | BODY MASS INDEX: 30.72 KG/M2 | WEIGHT: 173.4 LBS

## 2024-09-09 DIAGNOSIS — N92.1 BREAKTHROUGH BLEEDING: Primary | ICD-10-CM

## 2024-09-09 DIAGNOSIS — N92.1 BREAKTHROUGH BLEEDING ON OCPS: ICD-10-CM

## 2024-09-09 PROCEDURE — 99213 OFFICE O/P EST LOW 20 MIN: CPT | Performed by: ADVANCED PRACTICE MIDWIFE

## 2024-09-09 RX ORDER — ATORVASTATIN CALCIUM 10 MG/1
10 TABLET, FILM COATED ORAL DAILY
COMMUNITY
Start: 2024-08-15 | End: 2025-08-15

## 2024-09-09 NOTE — PROGRESS NOTES
[facility] [address] [place] [place]    Assessment/Plan:  No problem-specific Assessment & Plan notes found for this encounter.    Diagnoses and all orders for this visit:    Breakthrough bleeding    Breakthrough bleeding on OCPs  -     US pelvis complete w transvaginal; Future  -     taking continuous OCP- recommend stopping for 4 days and then restarting  -     will follow-up scan when available.         -      will schedule annual exam with Dr Stone in November       Subjective:   Di JI  is a 49 y.o.  female.  CC: bleeding    HPI: Alma Delia is here due to abnormal bleeding on OCP. She has been on continuous OCP for a while and is not having some tissue and brownish discharge and notes chunks. Feels that she is having ovarian pain again. In the past 3 months she started an organic chanel in the morning to help with vertigo and this started soon after that. Notes weight gain of 11 lbs and states that it is mostly in the last 3 weeks. Frustrated with weight, because she is eating salads, exercising and meets with weight management. Feels some change in urgency of urination, but has not been able to get an appointment with urology and had appointment that was moved 2 times.      Ultrasound 2023    FINDINGS:     UTERUS:  The uterus is anteverted in position, measuring 8.3 x 3.4 x 3.5 cm.  Myometrium is heterogeneous. There is a 1.3 x 0.9 x 1.1 cm anterior intramural fibroid previously measuring 1.0 x 0.9 x 1.2 cm. There is a posterior intramural fibroid measuring 1.3 x 1.2 x 1.3 cm, not measured previously.  The cervix appears within normal limits.     ENDOMETRIUM:  The endometrial echo complex has an AP caliber of 5.0 mm.  The endometrium is not well visualized due to surrounding heterogeneous myometrium and uterine position. The visualized portion appears grossly normal.    Cannot rule out adenomyosis.           ROS: Review of Systems   Constitutional:  Negative  "for chills and fever.   Respiratory:  Negative for shortness of breath.    Cardiovascular:  Negative for chest pain and palpitations.   Gastrointestinal:  Negative for constipation and diarrhea.   Genitourinary:  Positive for frequency, menstrual problem, pelvic pain and urgency. Negative for difficulty urinating, dysuria, vaginal discharge and vaginal pain.       PFSH: The following portions of the patient's history were reviewed and updated as appropriate: allergies, current medications, past family history, past medical history, obstetric history, gynecologic history, past social history, past surgical history and problem list.       Objective:  /74   Ht 5' 3\" (1.6 m)   Wt 78.7 kg (173 lb 6.4 oz)   LMP  (LMP Unknown)   BMI 30.72 kg/m²    Physical Exam  Vitals reviewed.   Pulmonary:      Effort: Pulmonary effort is normal.   Genitourinary:     General: Normal vulva.      Exam position: Lithotomy position.      Labia:         Right: No rash, tenderness or lesion.         Left: No rash, tenderness or lesion.       Urethra: No urethral pain, urethral swelling or urethral lesion.      Vagina: No vaginal discharge, erythema, tenderness or lesions.      Cervix: Cervical bleeding present. No cervical motion tenderness, discharge, friability, lesion or erythema.      Uterus: Not enlarged and not tender.       Adnexa:         Right: No mass, tenderness or fullness.          Left: No mass, tenderness or fullness.        Comments: Moderate amount of thick, brown old blood. No visible cervical polyps. Active menstrual bleeding  Skin:     General: Skin is warm and dry.   Neurological:      Mental Status: She is alert and oriented to person, place, and time.   Psychiatric:         Mood and Affect: Mood normal.         Behavior: Behavior normal.          "

## 2024-09-13 ENCOUNTER — HOSPITAL ENCOUNTER (OUTPATIENT)
Dept: ULTRASOUND IMAGING | Facility: HOSPITAL | Age: 49
Discharge: HOME | End: 2024-09-13
Payer: COMMERCIAL

## 2024-09-13 ENCOUNTER — HOSPITAL ENCOUNTER (OUTPATIENT)
Dept: MAMMOGRAPHY | Facility: HOSPITAL | Age: 49
Discharge: HOME | End: 2024-09-13
Payer: COMMERCIAL

## 2024-09-13 VITALS — BODY MASS INDEX: 30.65 KG/M2 | WEIGHT: 173 LBS | HEIGHT: 63 IN

## 2024-09-13 DIAGNOSIS — E04.1 NONTOXIC SINGLE THYROID NODULE: ICD-10-CM

## 2024-09-13 DIAGNOSIS — Z12.31 ENCOUNTER FOR SCREENING MAMMOGRAM FOR MALIGNANT NEOPLASM OF BREAST: ICD-10-CM

## 2024-09-13 DIAGNOSIS — N92.1 BREAKTHROUGH BLEEDING ON OCPS: ICD-10-CM

## 2024-09-13 PROCEDURE — 77067 SCR MAMMO BI INCL CAD: CPT

## 2024-09-13 PROCEDURE — 76830 TRANSVAGINAL US NON-OB: CPT

## 2024-09-13 PROCEDURE — 76536 US EXAM OF HEAD AND NECK: CPT

## 2024-09-13 PROCEDURE — 77063 BREAST TOMOSYNTHESIS BI: CPT

## 2024-09-13 PROCEDURE — 76856 US EXAM PELVIC COMPLETE: CPT

## 2024-10-22 ENCOUNTER — HOSPITAL ENCOUNTER (OUTPATIENT)
Dept: ULTRASOUND IMAGING | Facility: HOSPITAL | Age: 49
Discharge: HOME | End: 2024-10-22
Attending: UROLOGY
Payer: COMMERCIAL

## 2024-10-22 ENCOUNTER — OFFICE VISIT (OUTPATIENT)
Dept: UROLOGY | Facility: CLINIC | Age: 49
End: 2024-10-22
Payer: COMMERCIAL

## 2024-10-22 ENCOUNTER — OFFICE VISIT (OUTPATIENT)
Dept: OBGYN CLINIC | Facility: CLINIC | Age: 49
End: 2024-10-22
Payer: COMMERCIAL

## 2024-10-22 VITALS
BODY MASS INDEX: 30.65 KG/M2 | WEIGHT: 173 LBS | HEART RATE: 66 BPM | OXYGEN SATURATION: 99 % | DIASTOLIC BLOOD PRESSURE: 84 MMHG | HEIGHT: 63 IN | TEMPERATURE: 97.8 F | SYSTOLIC BLOOD PRESSURE: 136 MMHG

## 2024-10-22 VITALS
SYSTOLIC BLOOD PRESSURE: 122 MMHG | HEIGHT: 63 IN | BODY MASS INDEX: 30.65 KG/M2 | WEIGHT: 173 LBS | DIASTOLIC BLOOD PRESSURE: 80 MMHG

## 2024-10-22 DIAGNOSIS — R31.29 MICROHEMATURIA: ICD-10-CM

## 2024-10-22 DIAGNOSIS — R39.15 URINARY URGENCY: ICD-10-CM

## 2024-10-22 DIAGNOSIS — R31.29 MICROHEMATURIA: Primary | ICD-10-CM

## 2024-10-22 DIAGNOSIS — N92.1 BREAKTHROUGH BLEEDING ON OCPS: Primary | ICD-10-CM

## 2024-10-22 DIAGNOSIS — N84.0 ENDOMETRIAL POLYP: ICD-10-CM

## 2024-10-22 LAB
SL AMB  POCT GLUCOSE, UA: NEGATIVE
SL AMB LEUKOCYTE ESTERASE,UA: NEGATIVE
SL AMB POCT BILIRUBIN,UA: NEGATIVE
SL AMB POCT BLOOD,UA: NEGATIVE
SL AMB POCT CLARITY,UA: CLEAR
SL AMB POCT COLOR,UA: YELLOW
SL AMB POCT KETONES,UA: NEGATIVE
SL AMB POCT NITRITE,UA: NEGATIVE
SL AMB POCT PH,UA: 6
SL AMB POCT SPECIFIC GRAVITY,UA: 1.03
SL AMB POCT URINE PROTEIN: NEGATIVE
SL AMB POCT UROBILINOGEN: 0.2

## 2024-10-22 PROCEDURE — 99213 OFFICE O/P EST LOW 20 MIN: CPT | Performed by: OBSTETRICS & GYNECOLOGY

## 2024-10-22 PROCEDURE — 81002 URINALYSIS NONAUTO W/O SCOPE: CPT | Performed by: UROLOGY

## 2024-10-22 PROCEDURE — 52000 CYSTOURETHROSCOPY: CPT | Performed by: UROLOGY

## 2024-10-22 PROCEDURE — 76770 US EXAM ABDO BACK WALL COMP: CPT

## 2024-10-22 PROCEDURE — 99214 OFFICE O/P EST MOD 30 MIN: CPT | Performed by: UROLOGY

## 2024-10-22 RX ORDER — CIPROFLOXACIN 500 MG/1
500 TABLET, FILM COATED ORAL ONCE
Qty: 1 TABLET | Refills: 0 | Status: SHIPPED | OUTPATIENT
Start: 2024-10-22 | End: 2024-10-22

## 2024-10-22 RX ORDER — MAGNESIUM HYDROXIDE 400 MG/5ML
SUSPENSION, ORAL (FINAL DOSE FORM) ORAL
COMMUNITY

## 2024-10-22 RX ORDER — OMEGA-3S/DHA/EPA/FISH OIL/D3 300MG-1000
400 CAPSULE ORAL DAILY
COMMUNITY

## 2024-10-22 NOTE — PROGRESS NOTES
UROLOGY PROGRESS NOTE   [facility]  [address]  [address]  [place] [address]  [phone]  Fax:[phone]  [email or web address]      NAME: Di Payne  AGE: 49 y.o. SEX: female  : 1975   MRN: [id number]    DATE: 10/22/2024  TIME: 8:14 AM    Assessment and Plan:    Microhematuria: Cystoscopy negative, schedule kidney bladder ultrasound with PVR to evaluate feeling of incomplete bladder emptying and to evaluate her upper tracts for the hematuria workup.  Send with results.    Her symptoms as far as her bladder goes may very well be related to onset of menopause, and I told her this.  She can tolerate the symptoms.  If she wishes I can prescribe either an alpha-blocker or an anticholinergic, but I do not recommend this currently given the minimal nature of her symptoms.  She will let me know if she changes her mind.               Chief Complaint   No chief complaint on file.      History of Present Illness   New patient office [id number] woman with micro hematuria, here for cystoscopy.  She was recently seen by her midwife 2024 for breakthrough bleeding on oral contraceptive pills.  She was having urgency of urination as well.  No upper tract imaging.  G0 para 0.  She is considering hysterectomy.  She has a feeling of incomplete bladder emptying, and she has occasional urgency.  Basically if she urinates her bladder still feels full afterwards.       Cystoscopy     Date/Time  10/22/2024 8:00 AM     Performed by  Shon Melgar MD   Authorized by  Shon Melgar MD     Universal Protocol:  Consent: Verbal consent obtained. Written consent obtained.      Procedure Details:  Procedure type: cystoscopy    Additional Procedure Details: Cystoscopy Procedure Note        Pre-operative Diagnosis: Microhematuria workup    Post-operative Diagnosis: Same, normal bladder    Procedure: Flexible cystoscopy    Surgeon: Shon Melgar MD    Anesthesia: 1% Xylocaine per  urethra    EBL: Minimal    Complications: none    Procedure Details   The risks, benefits, complications, treatment options, and expected outcomes were discussed with the patient. The patient concurred with the proposed plan, giving informed consent.    Cystoscopy was performed today under local anesthesia, using sterile technique. The patient was placed in the supine position, prepped with Betadine, and draped in the usual sterile fashion. The flexible cystocope was used to inspect both the urethra and bladder    Findings:  Urethra: Normal without stricture.  No caruncle.  Mild atrophic changes to the vagina.    Bladder:  Smooth, not trabeculated and there were no stones tumors or other lesions.  The orifices were orthotopic and intact.           Specimens: none                 Complications:  None           Disposition: To home            Condition:  Stable              The following portions of the patient's history were reviewed and updated as appropriate: allergies, current medications, past family history, past medical history, past social history, past surgical history and problem list.  Past Medical History:   Diagnosis Date    Abnormal Pap smear of cervix 2000    Breast pain     Depression 11/5/1997    Dizziness     GERD (gastroesophageal reflux disease)     HL (hearing loss)     Hyperlipidemia     Mal de debarquement     Migraine     Varicella      Past Surgical History:   Procedure Laterality Date    CERVICAL BIOPSY  W/ LOOP ELECTRODE EXCISION  2000    CERVIX SURGERY      EAR SURGERY Bilateral     FL INJECTION LEFT HIP (ARTHROGRAM)  06/25/2019    GYNECOLOGIC CRYOSURGERY  2000     shoulder  Review of Systems   Review of Systems   Gastrointestinal:  Negative for blood in stool.        IBS with mostly loose stools but sometimes constipated.   Genitourinary:  Positive for dyspareunia, menstrual problem, urgency and vaginal bleeding. Negative for dysuria, frequency and hematuria.        Occasional pink tinge  "when she wiped, vaginal dryness.       Active Problem List     Patient Active Problem List   Diagnosis    Family planning, BCP (birth control pills) maintenance    COVID-19    Erythrocytosis    Generalized anxiety disorder with panic attacks    Meniere's disease    Mal de debarquement    Microscopic colitis    Migraines    Abnormal CT scan of lung       Objective   /84   Pulse 66   Temp 97.8 °F (36.6 °C)   Ht 5' 3\" (1.6 m)   Wt 78.5 kg (173 lb)   SpO2 99%   BMI 30.65 kg/m²     Physical Exam  Vitals reviewed.   Constitutional:       Appearance: Normal appearance.   HENT:      Head: Normocephalic and atraumatic.   Eyes:      Extraocular Movements: Extraocular movements intact.   Pulmonary:      Effort: Pulmonary effort is normal.   Genitourinary:     General: Normal vulva.      Vagina: No vaginal discharge.   Musculoskeletal:         General: Normal range of motion.      Cervical back: Normal range of motion.   Skin:     Coloration: Skin is not jaundiced or pale.   Neurological:      General: No focal deficit present.      Mental Status: She is alert and oriented to person, place, and time.   Psychiatric:         Mood and Affect: Mood normal.         Behavior: Behavior normal.         Thought Content: Thought content normal.         Judgment: Judgment normal.             Current Medications     Current Outpatient Medications:     atorvastatin (LIPITOR) 10 mg tablet, Take 10 mg by mouth daily, Disp: , Rfl:     clindamycin (CLEOCIN T) 1 % external solution, Apply topically 2 (two) times a day, Disp: 60 mL, Rfl: 3    cyclobenzaprine (FLEXERIL) 10 mg tablet, Take 1 tablet by mouth 3 (three) times a day as needed, Disp: , Rfl:     furosemide (LASIX) 20 mg tablet, Take 20 mg by mouth daily, Disp: , Rfl:     Junel 1/20 1-20 MG-MCG per tablet, TAKE 1 TABLET DAILY WITHOUTPLACEBO BREAK, Disp: 84 tablet, Rfl: 1    LORazepam (ATIVAN) 1 mg tablet, Take 1 mg by mouth every 8 (eight) hours as needed for anxiety, Disp: , " Rfl:     Lysine 1000 MG TABS, Take 3 tablets by mouth daily at bedtime, Disp: , Rfl:     MAGNESIUM GLYCINATE PO, Take 500 mg by mouth daily, Disp: , Rfl:     meclizine (ANTIVERT) 25 mg tablet, , Disp: , Rfl:     ondansetron (ZOFRAN-ODT) 4 mg disintegrating tablet, , Disp: , Rfl:     valACYclovir (VALTREX) 1,000 mg tablet, , Disp: , Rfl:     ZOLMitriptan (ZOMIG) 5 MG nasal solution, into each nostril as needed for migraine, Disp: , Rfl:     Cyanocobalamin 5000 MCG TBDP, Take 2 tablets by mouth daily (Patient not taking: Reported on 9/9/2024), Disp: , Rfl:     Multiple Vitamins-Minerals (multivitamin with minerals) tablet, Take 1 tablet by mouth daily (Patient not taking: Reported on 9/9/2024), Disp: , Rfl:     omeprazole (PriLOSEC) 20 mg delayed release capsule, Take 20 mg by mouth daily, Disp: , Rfl:     psyllium (METAMUCIL) 58.6 % powder, Take 1 packet by mouth 3 (three) times a day (Patient not taking: Reported on 9/9/2024), Disp: , Rfl:     ZOLMitriptan (ZOMIG-ZMT) 5 MG disintegrating tablet, , Disp: , Rfl:         Shon Melgar MD

## 2024-10-22 NOTE — PROGRESS NOTES
"Follow up of BTB on the OCP     bleeding from July to sept.    Stopped in sept nothing since then          Sonogram - 9/13/24    IMPRESSION:  While not grossly thickened, there is mild lobulation along the endometrial lining within the lower uterine segment. There is a small amount of nonspecific adjacent fluid. Small endometrial polyps cannot be excluded. Endometrial hyperplasia or carcinoma   are considered less likely.    Will do embx in the operating room for removal of the polyp      50 y/o with bleeding   Had BTB on the pills and skips the placebo and has side effects and the bleeding started     Past medical / social / surgical / family history reviewed and updated   Medication and allergies discussed in detail and updated     /80   Ht 5' 3\" (1.6 m)   Wt 78.5 kg (173 lb)   LMP 09/16/2024 (Approximate)   BMI 30.65 kg/m²     Review of Systems - History obtained from chart review and the patient  General ROS: negative  Psychological ROS: negative  Ophthalmic ROS: negative  ENT ROS: negative  Allergy and Immunology ROS: negative  Hematological and Lymphatic ROS: negative  Endocrine ROS: negative  Breast ROS: negative for breast lumps  Respiratory ROS: no cough, shortness of breath, or wheezing  Cardiovascular ROS: no chest pain or dyspnea on exertion  Gastrointestinal ROS: no abdominal pain, change in bowel habits, or black or bloody stools  Genito-Urinary ROS: no dysuria, trouble voiding, or hematuria  + bleeding   Musculoskeletal ROS: negative  Neurological ROS: no TIA or stroke symptoms  Dermatological ROS: negative      Physical Exam  Constitutional:       Appearance: She is well-developed.   HENT:      Nose: Nose normal.   Eyes:      Conjunctiva/sclera: Conjunctivae normal.   Neck:      Thyroid: No thyromegaly.   Cardiovascular:      Rate and Rhythm: Normal rate and regular rhythm.      Heart sounds: Normal heart sounds.   Pulmonary:      Effort: Pulmonary effort is normal. No respiratory distress. "      Breath sounds: Normal breath sounds. No wheezing.   Abdominal:      General: Bowel sounds are normal. There is no distension.      Palpations: Abdomen is soft.   Musculoskeletal:         General: Normal range of motion.      Cervical back: Normal range of motion and neck supple.   Skin:     General: Skin is warm.   Neurological:      Mental Status: She is alert and oriented to person, place, and time.   Psychiatric:         Behavior: Behavior normal.         Thought Content: Thought content normal.         Judgment: Judgment normal.

## 2024-11-01 ENCOUNTER — TELEPHONE (OUTPATIENT)
Dept: UROLOGY | Facility: CLINIC | Age: 49
End: 2024-11-01

## 2024-11-01 NOTE — TELEPHONE ENCOUNTER
----- Message from Shon Melgar MD sent at 10/31/2024  4:01 PM EDT -----  Please let her know over that looks okay on her kidney bladder ultrasound

## 2024-11-01 NOTE — TELEPHONE ENCOUNTER
Called patient and informed her that her ultrasound of the kidney and bladder looks okay. Patient confirmed understanding and was thankful for the call.

## 2024-11-06 ENCOUNTER — TELEPHONE (OUTPATIENT)
Dept: OBGYN CLINIC | Facility: MEDICAL CENTER | Age: 49
End: 2024-11-06

## 2024-11-06 NOTE — TELEPHONE ENCOUNTER
----- Message from Kingsley Stone MD sent at 10/30/2024  8:55 AM EDT -----  [facility] GYN Department  Surgery Scheduling Sheet    Patient Name: Di JI   : 1975    Provider: Kingsley Stone MD     Needed: no Language:     Procedure: exam under anesthesia and diagnostic hysteroscopy  Diagnosis: endo polyp    Special Needs or Equipment: symphion scope possible resectoscope   Anesthesia: general anesthesia    Length of stay: outpatient  Does patient have  comorbid conditions that will require close perioperative monitoring prior to safe discharge no  The patient has comorbid conditions that will require close perioperative monitoring prior to safe discharge, including N/A. This may require acute care beyond the usual and routine recovery period. As such, inpatient admission post-operatively is expected and appropriate, and anticipated hospital length of stay will be >2 midnights.      Pre-Admission Testing Needed: no   Labs ordered: per     PAT's recommended in prep for procedure ordered?: No    Medical Clearance Needed: no Provider:     MA Form Signed: no        Surgical Drink Given: no   How many days out of work: 1 day(s)     How many days no drivin    Are other appointments needed?  no  Interval for post op appt: 2 week(s)     For Surgical Scheduler:  Pre-op Appt:   Post op Appt:  Consult/medical clearance appt:           [facility]

## 2024-11-06 NOTE — TELEPHONE ENCOUNTER
Spoke to patient.. she agreed to a Friday on Dec 20, 2024 in [place].. Case entered and no labs are needed.. Patient is aware and has teams number..

## 2024-11-13 ENCOUNTER — APPOINTMENT (RX ONLY)
Dept: URBAN - NONMETROPOLITAN AREA CLINIC 4 | Facility: CLINIC | Age: 49
Setting detail: DERMATOLOGY
End: 2024-11-13

## 2024-11-13 DIAGNOSIS — L56.5 DISSEMINATED SUPERFICIAL ACTINIC POROKERATOSIS (DSAP): ICD-10-CM | Status: RESOLVED

## 2024-11-13 PROCEDURE — ? OTHER

## 2024-11-13 PROCEDURE — 99212 OFFICE O/P EST SF 10 MIN: CPT

## 2024-11-13 PROCEDURE — ? COUNSELING

## 2024-11-13 ASSESSMENT — LOCATION ZONE DERM: LOCATION ZONE: LEG

## 2024-11-13 ASSESSMENT — LOCATION DETAILED DESCRIPTION DERM
LOCATION DETAILED: LEFT LATERAL DISTAL PRETIBIAL REGION
LOCATION DETAILED: LEFT KNEE
LOCATION DETAILED: LEFT LATERAL KNEE
LOCATION DETAILED: RIGHT LATERAL PROXIMAL PRETIBIAL REGION

## 2024-11-13 ASSESSMENT — LOCATION SIMPLE DESCRIPTION DERM
LOCATION SIMPLE: RIGHT PRETIBIAL REGION
LOCATION SIMPLE: LEFT KNEE
LOCATION SIMPLE: LEFT PRETIBIAL REGION

## 2024-11-13 NOTE — PROCEDURE: OTHER
Note Text (......Xxx Chief Complaint.): This diagnosis correlates with the
Other (Free Text): Treated via liquid nitrogen at last visit 08/07/2024. Parakeratoses are resolved. Mild PIH.  
Detail Level: Zone
Render Risk Assessment In Note?: no

## 2024-11-20 ENCOUNTER — TELEPHONE (OUTPATIENT)
Age: 49
End: 2024-11-20

## 2024-11-20 NOTE — TELEPHONE ENCOUNTER
Patient called regarding her pov - she can not get to [place].  She is asking if we can find something in [place] or [place].  Please call patient.

## 2024-12-11 ENCOUNTER — ANESTHESIA EVENT (OUTPATIENT)
Dept: PERIOP | Facility: HOSPITAL | Age: 49
End: 2024-12-11
Payer: COMMERCIAL

## 2024-12-11 NOTE — PRE-PROCEDURE INSTRUCTIONS
Pre-Surgery Instructions:   Medication Instructions    atorvastatin (LIPITOR) 10 mg tablet Take night before surgery    Cetirizine HCl (ZYRTEC PO) Take day of surgery.    cholecalciferol (VITAMIN D3) 400 units tablet Take night before surgery    clindamycin (CLEOCIN T) 1 % external solution Uses PRN- DO NOT take day of surgery    Cyanocobalamin 5000 MCG TBDP Hold day of surgery.    cyclobenzaprine (FLEXERIL) 10 mg tablet Uses PRN- OK to take day of surgery    furosemide (LASIX) 20 mg tablet Uses PRN- DO NOT take day of surgery    Junel 1/20 1-20 MG-MCG per tablet Take day of surgery.    LORazepam (ATIVAN) 1 mg tablet Uses PRN- OK to take day of surgery    Lysine 1000 MG TABS Stop taking 7 days prior to surgery.    MAGNESIUM GLYCINATE PO Stop taking 7 days prior to surgery.    meclizine (ANTIVERT) 25 mg tablet Uses PRN- OK to take day of surgery    Misc Natural Products (Joint Support) CAPS Stop taking 7 days prior to surgery.    ondansetron (ZOFRAN-ODT) 4 mg disintegrating tablet Uses PRN- OK to take day of surgery    valACYclovir (VALTREX) 1,000 mg tablet Uses PRN- OK to take day of surgery    ZOLMitriptan (ZOMIG) 5 MG nasal solution Uses PRN- OK to take day of surgery   Medication instructions for day surgery reviewed. Please use only a sip of water to take your instructed medications. Avoid all over the counter vitamins, supplements and NSAIDS for one week prior to surgery per anesthesia guidelines. Tylenol is ok to take as needed.     You will receive a call one business day prior to surgery with an arrival time and hospital directions. If your surgery is scheduled on a Monday, the hospital will be calling you on the Friday prior to your surgery. If you have not heard from anyone by 8pm, please call the hospital supervisor through the hospital  at [phone]. ([place] [phone] or [place] [phone]).    Do not eat or drink anything after midnight the night before your surgery, including candy,  mints, lifesavers, or chewing gum. Do not drink alcohol 24hrs before your surgery. Try not to smoke at least 24hrs before your surgery.       Follow the pre surgery showering instructions as listed in the “My Surgical Experience Booklet” or otherwise provided by your surgeon's office. Do not use a blade to shave the surgical area 1 week before surgery. It is okay to use a clean electric clippers up to 24 hours before surgery. Do not apply any lotions, creams, including makeup, cologne, deodorant, or perfumes after showering on the day of your surgery. Do not use dry shampoo, hair spray, hair gel, or any type of hair products.     No contact lenses, eye make-up, or artificial eyelashes. Remove nail polish, including gel polish, and any artificial, gel, or acrylic nails if possible. Remove all jewelry including rings and body piercing jewelry.     Wear causal clothing that is easy to take on and off. Consider your type of surgery.    Keep any valuables, jewelry, piercings at home. Please bring any specially ordered equipment (sling, braces) if indicated.    Arrange for a responsible person to drive you to and from the hospital on the day of your surgery. Please confirm the visitor policy for the day of your procedure when you receive your phone call with an arrival time.     Call the surgeon's office with any new illnesses, exposures, or additional questions prior to surgery.    Please reference your “My Surgical Experience Booklet” for additional information to prepare for your upcoming surgery.

## 2024-12-20 ENCOUNTER — HOSPITAL ENCOUNTER (OUTPATIENT)
Facility: HOSPITAL | Age: 49
Setting detail: OUTPATIENT SURGERY
Discharge: HOME | End: 2024-12-20
Attending: OBSTETRICS & GYNECOLOGY | Admitting: OBSTETRICS & GYNECOLOGY
Payer: COMMERCIAL

## 2024-12-20 ENCOUNTER — ANESTHESIA (OUTPATIENT)
Dept: PERIOP | Facility: HOSPITAL | Age: 49
End: 2024-12-20
Payer: COMMERCIAL

## 2024-12-20 VITALS
RESPIRATION RATE: 14 BRPM | WEIGHT: 175.04 LBS | TEMPERATURE: 98.1 F | BODY MASS INDEX: 31.01 KG/M2 | OXYGEN SATURATION: 97 % | SYSTOLIC BLOOD PRESSURE: 126 MMHG | DIASTOLIC BLOOD PRESSURE: 81 MMHG | HEART RATE: 84 BPM

## 2024-12-20 DIAGNOSIS — N84.0 ENDOMETRIAL POLYP: ICD-10-CM

## 2024-12-20 PROCEDURE — 88305 TISSUE EXAM BY PATHOLOGIST: CPT | Performed by: PATHOLOGY

## 2024-12-20 PROCEDURE — NC001 PR NO CHARGE: Performed by: OBSTETRICS & GYNECOLOGY

## 2024-12-20 PROCEDURE — 58561 HYSTEROSCOPY REMOVE MYOMA: CPT | Performed by: OBSTETRICS & GYNECOLOGY

## 2024-12-20 RX ORDER — HYDROMORPHONE HCL/PF 1 MG/ML
0.5 SYRINGE (ML) INJECTION
Status: DISCONTINUED | OUTPATIENT
Start: 2024-12-20 | End: 2024-12-20

## 2024-12-20 RX ORDER — ONDANSETRON 2 MG/ML
4 INJECTION INTRAMUSCULAR; INTRAVENOUS ONCE AS NEEDED
Status: DISCONTINUED | OUTPATIENT
Start: 2024-12-20 | End: 2024-12-20

## 2024-12-20 RX ORDER — DEXAMETHASONE SODIUM PHOSPHATE 10 MG/ML
INJECTION, SOLUTION INTRAMUSCULAR; INTRAVENOUS AS NEEDED
Status: DISCONTINUED | OUTPATIENT
Start: 2024-12-20 | End: 2024-12-20

## 2024-12-20 RX ORDER — LIDOCAINE HYDROCHLORIDE 20 MG/ML
INJECTION, SOLUTION EPIDURAL; INFILTRATION; INTRACAUDAL; PERINEURAL AS NEEDED
Status: DISCONTINUED | OUTPATIENT
Start: 2024-12-20 | End: 2024-12-20

## 2024-12-20 RX ORDER — PROMETHAZINE HYDROCHLORIDE 25 MG/ML
6.25 INJECTION, SOLUTION INTRAMUSCULAR; INTRAVENOUS ONCE AS NEEDED
Status: DISCONTINUED | OUTPATIENT
Start: 2024-12-20 | End: 2024-12-20

## 2024-12-20 RX ORDER — ONDANSETRON 2 MG/ML
4 INJECTION INTRAMUSCULAR; INTRAVENOUS EVERY 6 HOURS PRN
Status: DISCONTINUED | OUTPATIENT
Start: 2024-12-20 | End: 2024-12-20 | Stop reason: HOSPADM

## 2024-12-20 RX ORDER — FLUTICASONE PROPIONATE 50 MCG
1 SPRAY, SUSPENSION (ML) NASAL DAILY
COMMUNITY

## 2024-12-20 RX ORDER — MIDAZOLAM HYDROCHLORIDE 2 MG/2ML
INJECTION, SOLUTION INTRAMUSCULAR; INTRAVENOUS AS NEEDED
Status: DISCONTINUED | OUTPATIENT
Start: 2024-12-20 | End: 2024-12-20

## 2024-12-20 RX ORDER — KETOROLAC TROMETHAMINE 30 MG/ML
INJECTION, SOLUTION INTRAMUSCULAR; INTRAVENOUS AS NEEDED
Status: DISCONTINUED | OUTPATIENT
Start: 2024-12-20 | End: 2024-12-20

## 2024-12-20 RX ORDER — ONDANSETRON 2 MG/ML
INJECTION INTRAMUSCULAR; INTRAVENOUS AS NEEDED
Status: DISCONTINUED | OUTPATIENT
Start: 2024-12-20 | End: 2024-12-20

## 2024-12-20 RX ORDER — FENTANYL CITRATE 50 UG/ML
INJECTION, SOLUTION INTRAMUSCULAR; INTRAVENOUS AS NEEDED
Status: DISCONTINUED | OUTPATIENT
Start: 2024-12-20 | End: 2024-12-20

## 2024-12-20 RX ORDER — SODIUM CHLORIDE 9 MG/ML
125 INJECTION, SOLUTION INTRAVENOUS CONTINUOUS
Status: DISCONTINUED | OUTPATIENT
Start: 2024-12-20 | End: 2024-12-20

## 2024-12-20 RX ORDER — PSEUDOEPHEDRINE HCL 120 MG/1
120 TABLET, FILM COATED, EXTENDED RELEASE ORAL 2 TIMES DAILY
COMMUNITY

## 2024-12-20 RX ORDER — FENTANYL CITRATE/PF 50 MCG/ML
50 SYRINGE (ML) INJECTION
Status: DISCONTINUED | OUTPATIENT
Start: 2024-12-20 | End: 2024-12-20

## 2024-12-20 RX ORDER — MEPERIDINE HYDROCHLORIDE 25 MG/ML
12.5 INJECTION INTRAMUSCULAR; INTRAVENOUS; SUBCUTANEOUS ONCE AS NEEDED
Status: DISCONTINUED | OUTPATIENT
Start: 2024-12-20 | End: 2024-12-20

## 2024-12-20 RX ORDER — ACETAMINOPHEN 325 MG/1
975 TABLET ORAL EVERY 6 HOURS PRN
Status: DISCONTINUED | OUTPATIENT
Start: 2024-12-20 | End: 2024-12-20 | Stop reason: HOSPADM

## 2024-12-20 RX ORDER — MAGNESIUM HYDROXIDE 1200 MG/15ML
LIQUID ORAL AS NEEDED
Status: DISCONTINUED | OUTPATIENT
Start: 2024-12-20 | End: 2024-12-20 | Stop reason: HOSPADM

## 2024-12-20 RX ORDER — PROPOFOL 10 MG/ML
INJECTION, EMULSION INTRAVENOUS AS NEEDED
Status: DISCONTINUED | OUTPATIENT
Start: 2024-12-20 | End: 2024-12-20

## 2024-12-20 RX ADMIN — LIDOCAINE HYDROCHLORIDE 100 MG: 20 INJECTION, SOLUTION EPIDURAL; INFILTRATION; INTRACAUDAL at 14:32

## 2024-12-20 RX ADMIN — ONDANSETRON 4 MG: 2 INJECTION INTRAMUSCULAR; INTRAVENOUS at 16:41

## 2024-12-20 RX ADMIN — FENTANYL CITRATE 25 MCG: 50 INJECTION INTRAMUSCULAR; INTRAVENOUS at 15:03

## 2024-12-20 RX ADMIN — MIDAZOLAM 2 MG: 1 INJECTION INTRAMUSCULAR; INTRAVENOUS at 14:23

## 2024-12-20 RX ADMIN — PROPOFOL 100 MG: 10 INJECTION, EMULSION INTRAVENOUS at 14:36

## 2024-12-20 RX ADMIN — FENTANYL CITRATE 50 MCG: 50 INJECTION INTRAMUSCULAR; INTRAVENOUS at 14:40

## 2024-12-20 RX ADMIN — PROPOFOL 200 MG: 10 INJECTION, EMULSION INTRAVENOUS at 14:32

## 2024-12-20 RX ADMIN — FENTANYL CITRATE 25 MCG: 50 INJECTION INTRAMUSCULAR; INTRAVENOUS at 14:52

## 2024-12-20 RX ADMIN — KETOROLAC TROMETHAMINE 30 MG: 30 INJECTION, SOLUTION INTRAMUSCULAR; INTRAVENOUS at 15:17

## 2024-12-20 RX ADMIN — SODIUM CHLORIDE 125 ML/HR: 0.9 INJECTION, SOLUTION INTRAVENOUS at 11:55

## 2024-12-20 RX ADMIN — DEXAMETHASONE SODIUM PHOSPHATE 10 MG: 10 INJECTION INTRAMUSCULAR; INTRAVENOUS at 14:38

## 2024-12-20 RX ADMIN — ONDANSETRON 4 MG: 2 INJECTION INTRAMUSCULAR; INTRAVENOUS at 15:09

## 2024-12-20 RX ADMIN — FENTANYL CITRATE 50 MCG: 50 INJECTION INTRAMUSCULAR; INTRAVENOUS at 15:28

## 2024-12-20 NOTE — H&P
Follow up of BTB on the OCP     bleeding from July to sept.    Stopped in sept nothing since then          Sonogram - 9/13/24    IMPRESSION:  While not grossly thickened, there is mild lobulation along the endometrial lining within the lower uterine segment. There is a small amount of nonspecific adjacent fluid. Small endometrial polyps cannot be excluded. Endometrial hyperplasia or carcinoma   are considered less likely.    Will do embx in the operating room for removal of the polyp      48 y/o with bleeding   Had BTB on the pills and skips the placebo and has side effects and the bleeding started     Past medical / social / surgical / family history reviewed and updated   Medication and allergies discussed in detail and updated     /73   Pulse 84   Temp 99.4 °F (37.4 °C) (Temporal)   Resp 16   Wt 79.4 kg (175 lb 0.7 oz)   LMP 09/07/2024 (Exact Date)   SpO2 98%   BMI 31.01 kg/m²     Review of Systems - History obtained from chart review and the patient  General ROS: negative  Psychological ROS: negative  Ophthalmic ROS: negative  ENT ROS: negative  Allergy and Immunology ROS: negative  Hematological and Lymphatic ROS: negative  Endocrine ROS: negative  Breast ROS: negative for breast lumps  Respiratory ROS: no cough, shortness of breath, or wheezing  Cardiovascular ROS: no chest pain or dyspnea on exertion  Gastrointestinal ROS: no abdominal pain, change in bowel habits, or black or bloody stools  Genito-Urinary ROS: no dysuria, trouble voiding, or hematuria  + bleeding   Musculoskeletal ROS: negative  Neurological ROS: no TIA or stroke symptoms  Dermatological ROS: negative      Physical Exam  Constitutional:       Appearance: She is well-developed.   HENT:      Nose: Nose normal.   Eyes:      Conjunctiva/sclera: Conjunctivae normal.   Neck:      Thyroid: No thyromegaly.   Cardiovascular:      Rate and Rhythm: Normal rate and regular rhythm.      Heart sounds: Normal heart sounds.   Pulmonary:       Effort: Pulmonary effort is normal. No respiratory distress.      Breath sounds: Normal breath sounds. No wheezing.   Abdominal:      General: Bowel sounds are normal. There is no distension.      Palpations: Abdomen is soft.   Musculoskeletal:         General: Normal range of motion.      Cervical back: Normal range of motion and neck supple.   Skin:     General: Skin is warm.   Neurological:      Mental Status: She is alert and oriented to person, place, and time.   Psychiatric:         Behavior: Behavior normal.         Thought Content: Thought content normal.         Judgment: Judgment normal.

## 2024-12-20 NOTE — ANESTHESIA PREPROCEDURE EVALUATION
Procedure:  D&C, HYSTEROSCOPY, SYMPHION, EUA (Uterus)    Relevant Problems   CARDIO   (+) Migraines      NEURO/PSYCH   (+) Generalized anxiety disorder with panic attacks   (+) Migraines        Physical Exam    Airway    Mallampati score: I  TM Distance: >3 FB  Neck ROM: full     Dental   No notable dental hx     Cardiovascular  Cardiovascular exam normal    Pulmonary  Pulmonary exam normal     Other Findings  post-pubertal.      Anesthesia Plan  ASA Score- 2     Anesthesia Type- general with ASA Monitors.         Additional Monitors:     Airway Plan: LMA.           Plan Factors-Exercise tolerance (METS): >4 METS.    Chart reviewed.   Existing labs reviewed. Patient summary reviewed.    Patient is not a current smoker.      Obstructive sleep apnea risk education given perioperatively.        Induction- intravenous.    Postoperative Plan- Plan for postoperative opioid use.     Perioperative Resuscitation Plan - Level 1 - Full Code.       Informed Consent- Anesthetic plan and risks discussed with patient and spouse.

## 2024-12-21 NOTE — ANESTHESIA POSTPROCEDURE EVALUATION
Post-Op Assessment Note    CV Status:  Stable    Pain management: adequate       Mental Status:  Alert and awake   Hydration Status:  Euvolemic   PONV Controlled:  Controlled   Airway Patency:  Patent     Post Op Vitals Reviewed: Yes    No anethesia notable event occurred.    Staff: Anesthesiologist, CRNA           Last Filed PACU Vitals:  Vitals Value Taken Time   Temp 98.1 °F (36.7 °C) 12/20/24 1548   Pulse 82 12/20/24 1552   /75 12/20/24 1548   Resp 16 12/20/24 1548   SpO2 99 % 12/20/24 1552   Vitals shown include unfiled device data.    Modified Bessie:  Activity: 2 (12/20/2024  3:48 PM)  Respiration: 2 (12/20/2024  3:48 PM)  Circulation: 2 (12/20/2024  3:48 PM)  Consciousness: 2 (12/20/2024  3:48 PM)  Oxygen Saturation: 2 (12/20/2024  3:48 PM)  Modified Bessie Score: 10 (12/20/2024  3:48 PM)

## 2024-12-21 NOTE — OP NOTE
OPERATIVE REPORT  PATIENT NAME: Di JI     :  1975  MRN: [id number]  Pt Location: [facility] OR ROOM 04    SURGERY DATE: 2024    Surgeons and Role:     * Kingsley Stone MD - Primary     * Nini Keane MD - Assisting    Preop Diagnosis:  Endometrial polyp [N84.0]    Post-Op Diagnosis Codes:     * Endometrial polyp [N84.0]    Procedure(s):  D&C. HYSTEROSCOPY. SYMPHION. EUA    Specimen(s):  ID Type Source Tests Collected by Time Destination   1 : ECC Tissue Endocervical TISSUE EXAM Kingsley Stone MD 2024 1450    2 : Endometrial fibroids Tissue Endometrium TISSUE EXAM Kingsley Stone MD 2024 1456        Estimated Blood Loss:   Minimal    Anesthesia Type:   LMA    Operative Indications:  Endometrial polyp [N84.0]  Breakthrough Bleeding on birth control pills     Operative Findings:  Uterus with a few large polyps / fibroids   Both ostia seen       Complications:   None    Procedure and Technique:      Procedure and Technique:  The patient was correctly identified as and taken to the OR where general anesthesia was obtained without difficulty.  She was placed in the dorsal lithotomy position and was prepped and draped in a sterile fashion.   The patient was examined under anesthesia.     A weighted speculum and Tab retractor was inserted into the vagina and the anterior lip of the cervix was visualized and grasped with a single toothed tenaculum.       Uterus was sounded to 8.    Using Eloy dilators, the cervix was progressively dilated to about 18 Hanks.     A 6.3 mm 0 degree SYMPHION Hysteroscope was inserted. The entire endometrial cavity was visualized as well as bilateral ostia.     The endometrial cavity appeared as above.     Symphion tissue removal system was inserted and 3  endometrial polyp/ fibroids was removed hysteroscopically under direct visualization down to its base. Good hemostasis was noted.    There was no evidence of uterine perforation.    The hysteroscope was  removed.    The tenaculum and retractor were removed. There a small amount of no bleeding noted from tenaculum site that was controlled with direct pressure.     The patient tolerated the procedure well. Sponge and instrument count were correct x 2.      I was present for the entire procedure.    Patient Disposition:  PACU              SIGNATURE: Kingsley Stone MD  DATE: December 20, 2024  TIME: 9:08 PM

## 2024-12-26 DIAGNOSIS — Z30.41 ENCOUNTER FOR SURVEILLANCE OF CONTRACEPTIVE PILLS: ICD-10-CM

## 2024-12-27 RX ORDER — NORETHINDRONE ACETATE AND ETHINYL ESTRADIOL 1; 20 MG/1; UG/1
TABLET ORAL
Qty: 84 TABLET | Refills: 1 | Status: SHIPPED | OUTPATIENT
Start: 2024-12-27

## 2024-12-31 ENCOUNTER — RESULTS FOLLOW-UP (OUTPATIENT)
Dept: OBGYN CLINIC | Facility: MEDICAL CENTER | Age: 49
End: 2024-12-31

## 2025-01-23 ENCOUNTER — ANNUAL EXAM (OUTPATIENT)
Dept: OBGYN CLINIC | Facility: CLINIC | Age: 50
End: 2025-01-23
Payer: COMMERCIAL

## 2025-01-23 VITALS
DIASTOLIC BLOOD PRESSURE: 78 MMHG | BODY MASS INDEX: 28.88 KG/M2 | SYSTOLIC BLOOD PRESSURE: 124 MMHG | WEIGHT: 163 LBS | HEIGHT: 63 IN

## 2025-01-23 DIAGNOSIS — Z01.419 WOMEN'S ANNUAL ROUTINE GYNECOLOGICAL EXAMINATION: Primary | ICD-10-CM

## 2025-01-23 DIAGNOSIS — Z12.39 BREAST CANCER SCREENING, HIGH RISK PATIENT: ICD-10-CM

## 2025-01-23 PROCEDURE — G0476 HPV COMBO ASSAY CA SCREEN: HCPCS | Performed by: OBSTETRICS & GYNECOLOGY

## 2025-01-23 PROCEDURE — 99396 PREV VISIT EST AGE 40-64: CPT | Performed by: OBSTETRICS & GYNECOLOGY

## 2025-01-23 PROCEDURE — G0145 SCR C/V CYTO,THINLAYER,RESCR: HCPCS | Performed by: OBSTETRICS & GYNECOLOGY

## 2025-01-23 NOTE — PROGRESS NOTES
A/P  A/P    1.  Annual exam    Last PAP -11/12/23- neg neg   Next due today pt request.    Scheduling of pap discussed in detail      Mammogram - last 9/24/24 # 2   Next due 2025   Self breast exams discussed     Colonoscopy - 10/2022- x 4     2.  Post op  DC hysteroscopy- symphony      A. Endocervical, ECC,  curetting:  -Predominantly mucinous material containing strips of benign endocervical epithelium.     B. Myomectomy w/o Uterus, Endometrial fibroids:  -Fragment of smooth muscle, consistent with leiomyoma ,  4.5 x 3.5 x 0.8 cm   -Scant fragment of secretory pattern endometrium     49 y.o.,Patient's last menstrual period was 12/20/2024 (exact date).  C/O for re annual and post op care of DC       Past medical / social / surgical / family history reviewed and updated   Medication and allergies discussed in detail and updated     Review of Systems - History obtained from chart review and the patient  General ROS: negative  Psychological ROS: negative  Ophthalmic ROS: negative  ENT ROS: negative  Allergy and Immunology ROS: negative  Hematological and Lymphatic ROS: negative  Endocrine ROS: negative  Breast ROS: negative for breast lumps  Respiratory ROS: no cough, shortness of breath, or wheezing  Cardiovascular ROS: no chest pain or dyspnea on exertion  Gastrointestinal ROS: no abdominal pain, change in bowel habits, or black or bloody stools  Genito-Urinary ROS: no dysuria, trouble voiding, or hematuria  Musculoskeletal ROS: negative  Neurological ROS: no TIA or stroke symptoms  Dermatological ROS: negative        Physical Exam  Vitals reviewed. Exam conducted with a chaperone present.   Constitutional:       Appearance: She is well-developed.   Neck:      Thyroid: No thyromegaly.   Cardiovascular:      Rate and Rhythm: Normal rate.      Heart sounds: Normal heart sounds.   Pulmonary:      Effort: Pulmonary effort is normal. No accessory muscle usage or respiratory distress.      Breath sounds: Normal air entry.    Chest:      Chest wall: No tenderness.   Breasts:     Breasts are symmetrical.      Right: No inverted nipple, mass or tenderness.      Left: No inverted nipple, mass or tenderness.   Abdominal:      General: There is no distension.      Palpations: Abdomen is soft.      Tenderness: There is no abdominal tenderness. There is no right CVA tenderness, left CVA tenderness, guarding or rebound.   Genitourinary:     General: Normal vulva.      Exam position: Lithotomy position.      Labia:         Right: No rash, tenderness, lesion or injury.         Left: No rash, tenderness, lesion or injury.       Vagina: Normal. No foreign body. No vaginal discharge or bleeding.      Cervix: Normal.      Uterus: Normal. Not enlarged and not fixed.       Adnexa: Right adnexa normal and left adnexa normal.        Right: No mass, tenderness or fullness.          Left: No mass, tenderness or fullness.        Rectum: No external hemorrhoid.   Musculoskeletal:      Cervical back: Normal range of motion.   Lymphadenopathy:      Cervical: No cervical adenopathy.      Upper Body:      Right upper body: No supraclavicular adenopathy.      Left upper body: No supraclavicular adenopathy.   Neurological:      Mental Status: She is alert and oriented to person, place, and time.   Psychiatric:         Speech: Speech normal.         Behavior: Behavior normal.         Thought Content: Thought content normal.         Judgment: Judgment normal.

## 2025-01-28 ENCOUNTER — RESULTS FOLLOW-UP (OUTPATIENT)
Dept: OBGYN CLINIC | Facility: MEDICAL CENTER | Age: 50
End: 2025-01-28

## 2025-01-28 LAB
LAB AP GYN PRIMARY INTERPRETATION: NORMAL
Lab: NORMAL

## 2025-04-07 ENCOUNTER — EVALUATION (OUTPATIENT)
Dept: PHYSICAL THERAPY | Facility: CLINIC | Age: 50
End: 2025-04-07
Payer: COMMERCIAL

## 2025-04-07 DIAGNOSIS — R42 VERTIGO: Primary | ICD-10-CM

## 2025-04-07 PROCEDURE — 97162 PT EVAL MOD COMPLEX 30 MIN: CPT

## 2025-04-07 NOTE — PROGRESS NOTES
"PT Evaluation     Today's date: 2025  Patient name: Di Payne  : 1975  MRN: [id number]  Referring provider: Cesar Bateman MD  Dx:   Encounter Diagnosis     ICD-10-CM    1. Vertigo  R42             Start Time: 1502  Stop Time: 1550  Total time in clinic (min): 48 minutes    Assessment  Impairments: abnormal movement, activity intolerance, impaired balance, lacks appropriate home exercise program, safety issue, unable to perform ADL, participation limitations and activity limitations  Symptom irritability: low    Assessment details: Di \"Danyelle Payne is a 49-year-old female who presented to outpatient physical therapy services for vertigo. Her cervical AROM was WFL, but she exhibited hesitancy to move her head secondary to vertiginous symptom irritability. Her ocular AROM was WFL in all directions, but she demonstrated a gaze-evoked nystagmus to the L. She exhibited a geotropic nystagmus on the L during the Roll Test, indicating pathology of the L horizontal canal. Following patient education and consent, a canalith repositioning maneuver was performed subsequently for two repetitions. Alma Delia noted increased symptom irritability following treatment, and she was advised to contact the office tomorrow to provide feedback. Secondary to these symptoms and functional impairments, Alma Delia has difficulty performing her usual household and recreational activities. She would benefit from skilled physical therapy services 2-3 times/week for 6 weeks to address impairments in canalith positioning, balance, and activity tolerance. Alma Delia reviewed and performed the exercises included in her HEP to provide concurrent feedback regarding proper positioning and their purpose within the POC. Time was allotted for questions and concerns, and these were answered to Alma Delia's satisfaction. Thank you for your referral.   Understanding of Dx/Px/POC: good     Prognosis: good    Goals  Short-Term Goals (1-3 Weeks)  1. "  Alma Delia will have a reduction in vertiginous symptoms by 50%.  2.  Alma Delia will lean back in her chair at work without symptom exacerbation.  3.  Alma Delia will report that she no longer requires Meclizine.    Long-Term Goals (4-6 Weeks)  1.  Alma Delia will report a resolution of vertiginous symptoms to encourage a return to IADLs.   2.  Alma Delia will exhibit a (-) Roll Test to demonstrate correction of otolith placement in the L horizontal canal.   3.  Alma Delia will bend forward for 5 repetitions with minimal symptoms to show improved activity tolerance.   4.  Alma Delia will consistently perform her vestibular rehabilitation exercises at least 3 times/week to manage her symptoms.     Plan  Patient would benefit from: skilled physical therapy    Planned therapy interventions: activity modification, ADL retraining, balance, canalith repositioning, body mechanics training, coordination, gait training, graded activity, graded exercise, home exercise program, IADL retraining, manual therapy, neuromuscular re-education, patient/caregiver education, postural training, self care, strengthening, therapeutic activities and therapeutic exercise    Frequency: 2-3x week  Duration in weeks: 6  Plan of Care beginning date: 4/7/2025  Plan of Care expiration date: 5/23/2025  Treatment plan discussed with: patient  Plan details: Alma Delia reviewed and agreed with the POC.       Subjective Evaluation    History of Present Illness  Mechanism of injury: Alma Delia reported that she sat up in bed this morning, causing the room to spin around her. She took her prescription Meclizine and Ativan this morning, which did reduce some of her symptoms. Leaning back in her chair at work elicits symptoms, and she is fearful of head movement. Alma Delia noted that a milder episode occurred about a week ago. She has a long history of vertigo, and she experiences vestibular headaches every day. Through skilled physical therapy services, Alma Delia would like to return to her usual  activities without vertigo symptoms.           Recurrent probem    Quality of life: fair    Patient Goals  Patient goals for therapy: improved balance, return to sport/leisure activities, increased motion and independence with ADLs/IADLs    Pain  No pain reported  Progression: worsening    Social Support  Lives with: spouse    Employment status: working  Treatments  Previous treatment: physical therapy and medication  Current treatment: medication and physical therapy      Objective     Concurrent Complaints  Positive for headaches (Vestibular Headaches), nausea/motion sickness, tinnitus, aural fullness and poor concentration (Brain Fog). Negative for visual change, hearing loss, memory loss and peripheral neuropathy    Active Range of Motion   Cervical/Thoracic Spine       Cervical    Flexion: 70 degrees   Extension: 54 degrees      Left lateral flexion: 50 degrees      Right lateral flexion: 55 degrees      Left rotation: 72 degrees  Right rotation: 70 degrees       Tests     Additional Tests Details  Chester Hallpike: (+) Nastagmus to Left      Neuro Exam:     Dizziness  Positive for disequilibrium, vertigo, motion sickness, light-headedness, rocking or swaying and floating or swimming.   Negative for oscillopsia and diplopia.     Exacerbating factors  Positive for bending over, rolling in bed, looking up and supine to/from sitting.   Negative for walking, turning head, optokinetic movement and walking in busy environment.     Symptoms   Duration: 15-20 seconds  Frequency: 2 times/week  Intensity at best: 0/10  Intensity at worst: 7/10  Average intensity: 0/10    Headaches   Patient reports headaches: Yes (Vestibular Headaches).   Frequency: Daily  Duration: All Day  Intensity at best: 0/10  Intensity at worst: 3/10  Average intensity: 7/10    Oculomotor exam   Oculomotor ROM: WNL  Resting nystagmus: not present   Gaze holding nystagmus: present left  Gaze holding nystagmus: not present right  Smooth pursuits: within  normal limits  Vertical saccades: normal  Horizontal saccades: normal  Convergence: normal  Head thrust: left normal and right normal    Positional testing   Sammi-Hallpike   Left posterior canal: symptomatic and WNL  Right posterior canal: WNL  Duration: 2 (seconds)  Roll test   Left horizontal canal: symptomatic  Right horizontal canal: WNL  Duration: 5 (seconds)             Precautions: Hx of migraines, Meniere's Disease, mal de debarquement, anxiety, and depression; LATEX ALLERGY  HEP Code: N/A  POC: 04/07/2025-05/23/2025    Manuals 04/07        Hutto-Hallpike Test         Roll Test (+) L        Epley Maneuver         BBQ Maneuver Performed 2x        SOR/MFR of Cervical Paraspinals/Cervical Distratcion                  Neuro Re-Ed         Cervical AROM (FLX/EXT/SB/ROT) 10x each        VOR2  (Horizontal/Vertical)         Two-Point Saccades (Horizontal/Vertical)         Ball Toss at Wall w/ Various LEONEL         Reach Out of LEONEL (FWD/LAT)         NBOS w/ Head Turns and Reach         Wobble Board Standing w/ Perturbations (AP/LAT)         Wobble Board Standing w/ Ball Throw (AP/LAT)         Ambulation w/ Head Turns         Ambulation w/ Head Nods         Caraokes         Cone Taps         CTSIB                  Ther Ex         Treadmill         Marches         Side Steps                  Ther Activity          FWD Bending w/ Rings         LAT Ring Toss Between Tables         Ambulation w/ Ball Toss         Card Activity

## 2025-04-07 NOTE — LETTER
"2025    Cesar Bateman MD  [address] [place] [address]    Patient: Di Payne   YOB: 1975   Date of Visit: 2025     Encounter Diagnosis     ICD-10-CM    1. Vertigo  R42           Dear Dr. Cesar Bateman MD:    Thank you for your recent referral of Di Payne. Please review the attached evaluation summary from Di's recent visit.     Please verify that you agree with the plan of care by signing the attached order.     If you have any questions or concerns, please do not hesitate to call.     I sincerely appreciate the opportunity to share in the care of one of your patients and hope to have another opportunity to work with you in the near future.       Sincerely,    Mahsa Clayton, PT      Referring Provider:      I certify that I have read the below Plan of Care and certify the need for these services furnished under this plan of treatment while under my care.                    Cesar Bateman MD  [address] [place] [address]  Via Fax: [phone]          PT Evaluation     Today's date: 2025  Patient name: Di Payne  : 1975  MRN: [id number]  Referring provider: Cesar Bateman MD  Dx:   Encounter Diagnosis     ICD-10-CM    1. Vertigo  R42             Start Time: 1502  Stop Time: 1550  Total time in clinic (min): 48 minutes    Assessment  Impairments: abnormal movement, activity intolerance, impaired balance, lacks appropriate home exercise program, safety issue, unable to perform ADL, participation limitations and activity limitations  Symptom irritability: low    Assessment details: Di \"Alma Delia\" Kerry is a 49-year-old female who presented to outpatient physical therapy services for vertigo. Her cervical AROM was WFL, but she exhibited hesitancy to move her head secondary to vertiginous symptom irritability. Her ocular AROM was WFL in all directions, but she demonstrated a gaze-evoked nystagmus to the L. She exhibited a " geotropic nystagmus on the L during the Roll Test, indicating pathology of the L horizontal canal. Following patient education and consent, a canalith repositioning maneuver was performed subsequently for two repetitions. Alma Delia noted increased symptom irritability following treatment, and she was advised to contact the office tomorrow to provide feedback. Secondary to these symptoms and functional impairments, Alma Delia has difficulty performing her usual household and recreational activities. She would benefit from skilled physical therapy services 2-3 times/week for 6 weeks to address impairments in canalith positioning, balance, and activity tolerance. Alma Delia reviewed and performed the exercises included in her HEP to provide concurrent feedback regarding proper positioning and their purpose within the POC. Time was allotted for questions and concerns, and these were answered to Alma Delia's satisfaction. Thank you for your referral.   Understanding of Dx/Px/POC: good     Prognosis: good    Goals  Short-Term Goals (1-3 Weeks)  1.  Alma Delia will have a reduction in vertiginous symptoms by 50%.  2.  Alma Delia will lean back in her chair at work without symptom exacerbation.  3.  Alma Delia will report that she no longer requires Meclizine.    Long-Term Goals (4-6 Weeks)  1.  Alma Delia will report a resolution of vertiginous symptoms to encourage a return to IADLs.   2.  Alma Delia will exhibit a (-) Roll Test to demonstrate correction of otolith placement in the L horizontal canal.   3.  Alam Delia will bend forward for 5 repetitions with minimal symptoms to show improved activity tolerance.   4.  Alma Delia will consistently perform her vestibular rehabilitation exercises at least 3 times/week to manage her symptoms.     Plan  Patient would benefit from: skilled physical therapy    Planned therapy interventions: activity modification, ADL retraining, balance, canalith repositioning, body mechanics training, coordination, gait training, graded activity,  graded exercise, home exercise program, IADL retraining, manual therapy, neuromuscular re-education, patient/caregiver education, postural training, self care, strengthening, therapeutic activities and therapeutic exercise    Frequency: 2-3x week  Duration in weeks: 6  Plan of Care beginning date: 4/7/2025  Plan of Care expiration date: 5/23/2025  Treatment plan discussed with: patient  Plan details: Alma Delia reviewed and agreed with the POC.       Subjective Evaluation    History of Present Illness  Mechanism of injury: Alma Delia reported that she sat up in bed this morning, causing the room to spin around her. She took her prescription Meclizine and Ativan this morning, which did reduce some of her symptoms. Leaning back in her chair at work elicits symptoms, and she is fearful of head movement. Alma Delia noted that a milder episode occurred about a week ago. She has a long history of vertigo, and she experiences vestibular headaches every day. Through skilled physical therapy services, Alma Delia would like to return to her usual activities without vertigo symptoms.           Recurrent probem    Quality of life: fair    Patient Goals  Patient goals for therapy: improved balance, return to sport/leisure activities, increased motion and independence with ADLs/IADLs    Pain  No pain reported  Progression: worsening    Social Support  Lives with: spouse    Employment status: working  Treatments  Previous treatment: physical therapy and medication  Current treatment: medication and physical therapy      Objective     Concurrent Complaints  Positive for headaches (Vestibular Headaches), nausea/motion sickness, tinnitus, aural fullness and poor concentration (Brain Fog). Negative for visual change, hearing loss, memory loss and peripheral neuropathy    Active Range of Motion   Cervical/Thoracic Spine       Cervical    Flexion: 70 degrees   Extension: 54 degrees      Left lateral flexion: 50 degrees      Right lateral flexion: 55  degrees      Left rotation: 72 degrees  Right rotation: 70 degrees       Tests     Additional Tests Details  Sammi Hallpike: (+) Nastagmus to Left      Neuro Exam:     Dizziness  Positive for disequilibrium, vertigo, motion sickness, light-headedness, rocking or swaying and floating or swimming.   Negative for oscillopsia and diplopia.     Exacerbating factors  Positive for bending over, rolling in bed, looking up and supine to/from sitting.   Negative for walking, turning head, optokinetic movement and walking in busy environment.     Symptoms   Duration: 15-20 seconds  Frequency: 2 times/week  Intensity at best: 0/10  Intensity at worst: 7/10  Average intensity: 0/10    Headaches   Patient reports headaches: Yes (Vestibular Headaches).   Frequency: Daily  Duration: All Day  Intensity at best: 0/10  Intensity at worst: 3/10  Average intensity: 7/10    Oculomotor exam   Oculomotor ROM: WNL  Resting nystagmus: not present   Gaze holding nystagmus: present left  Gaze holding nystagmus: not present right  Smooth pursuits: within normal limits  Vertical saccades: normal  Horizontal saccades: normal  Convergence: normal  Head thrust: left normal and right normal    Positional testing   Sammi-Hallpike   Left posterior canal: symptomatic and WNL  Right posterior canal: WNL  Duration: 2 (seconds)  Roll test   Left horizontal canal: symptomatic  Right horizontal canal: WNL  Duration: 5 (seconds)             Precautions: Hx of migraines, Meniere's Disease, mal de debarquement, anxiety, and depression; LATEX ALLERGY  HEP Code: N/A  POC: 04/07/2025-05/23/2025    Manuals 04/07        Ironside-Hallpike Test         Roll Test (+) L        Epley Maneuver         BBQ Maneuver Performed 2x        SOR/MFR of Cervical Paraspinals/Cervical Distratcion                  Neuro Re-Ed         Cervical AROM (FLX/EXT/SB/ROT) 10x each        VOR2  (Horizontal/Vertical)         Two-Point Saccades (Horizontal/Vertical)         Ball Toss at Wall w/ Various  LEONEL         Reach Out of LEONEL (FWD/LAT)         NBOS w/ Head Turns and Reach         Wobble Board Standing w/ Perturbations (AP/LAT)         Wobble Board Standing w/ Ball Throw (AP/LAT)         Ambulation w/ Head Turns         Ambulation w/ Head Nods         Caraokes         Cone Taps         CTSIB                  Ther Ex         Treadmill         Marches         Side Steps                  Ther Activity          FWD Bending w/ Rings         LAT Ring Toss Between Tables         Ambulation w/ Ball Toss         Card Activity

## 2025-04-10 ENCOUNTER — OFFICE VISIT (OUTPATIENT)
Dept: PHYSICAL THERAPY | Facility: CLINIC | Age: 50
End: 2025-04-10
Payer: COMMERCIAL

## 2025-04-10 DIAGNOSIS — R42 VERTIGO: Primary | ICD-10-CM

## 2025-04-10 PROCEDURE — 97112 NEUROMUSCULAR REEDUCATION: CPT

## 2025-04-10 PROCEDURE — 97140 MANUAL THERAPY 1/> REGIONS: CPT

## 2025-04-10 NOTE — PROGRESS NOTES
"Daily Note     Today's date: 4/10/2025  Patient name: Di JI   : 1975  MRN: [id number]  Referring provider: Cesar Bateman MD  Dx:   Encounter Diagnosis     ICD-10-CM    1. Vertigo  R42           Start Time: 1403  Stop Time: 1443  Total time in clinic (min): 40 minutes    Subjective: Alma Delia reported that her symptoms were horrible on Tuesday, and she took her rescue medicine without complete resolution of her symptoms. Her symptoms were slightly better yesterday, and she feels like she has heightened vestibular migraine symptoms today.       Objective: See treatment diary below      Assessment: Alma Delia tolerated treatment well. She exhibited a (+) Likely-Hallpike to the left with a torsional nystagmus, and an Epley maneuver was performed subsequently for three repetitions with seated vestibular rehabilitation exercises performed between each. Her overall symptom irritability remained at a 2/10 following treatment, and she was advised to contact the office tomorrow regarding her sense of dizziness. Alma Delia would benefit from continued PT to reduce her intolerance for occupational and recreational activities and assist with canalith repositioning.       Plan: Continue per plan of care.      Precautions: Hx of migraines, Meniere's Disease, mal de debarquement, anxiety, and depression; LATEX ALLERGY  HEP Code: N/A  POC: 2025-2025    Manuals 04/07 04/10       Likely-Hallpike Test  (+) L       Roll Test (+) L        Epley Maneuver         BBQ Maneuver Performed 2x Performed 3x       SOR/MFR of Cervical Paraspinals/Cervical Distratcion                  Neuro Re-Ed         Cervical AROM (FLX/EXT/SB/ROT) 10x each        VOR1  (Horizontal/Vertical)  3x30\" each       Visual Diagonals w/ Cards  15x each       Two-Point Saccades w/ Pens (Horizontal/Vertical)  2x20\" each       Ball Toss at Wall w/ Various LEONEL         Reach Out of LEONEL (FWD/LAT)         NBOS w/ Head Turns and Reach         Wobble Board " Standing w/ Perturbations (AP/LAT)         Wobble Board Standing w/ Ball Throw (AP/LAT)         Ambulation w/ Head Turns         Ambulation w/ Head Nods         Caraokes         Cone Taps         CTSIB                  Ther Ex         Treadmill         Marches         Side Steps                  Ther Activity          FWD Bending w/ Rings         LAT Ring Toss Between Tables         Ambulation w/ Ball Toss         Card Activity

## 2025-04-15 ENCOUNTER — OFFICE VISIT (OUTPATIENT)
Dept: PHYSICAL THERAPY | Facility: CLINIC | Age: 50
End: 2025-04-15
Payer: COMMERCIAL

## 2025-04-15 DIAGNOSIS — R42 VERTIGO: Primary | ICD-10-CM

## 2025-04-15 PROCEDURE — 97140 MANUAL THERAPY 1/> REGIONS: CPT

## 2025-04-15 PROCEDURE — 97112 NEUROMUSCULAR REEDUCATION: CPT

## 2025-04-15 NOTE — PROGRESS NOTES
"Daily Note     Today's date: 4/15/2025  Patient name: Di JI   : 1975  MRN: [id number]  Referring provider: Cesar Bateman MD  Dx:   Encounter Diagnosis     ICD-10-CM    1. Vertigo  R42           Start Time: 803  Stop Time: 847  Total time in clinic (min): 44 minutes    Subjective: Alma Delia reported that her ears are feeling full today, and she remarked that she felt a room-spinning when turning over in bed last night.       Objective: See treatment diary below      Assessment: Alma Delia tolerated treatment well. She exhibited a (+) Sammi-Hallpike on the L with a torsional nystagmus, indicating pathology of the posterior canal. During the second repetition of the Epley maneuver, she experienced a significant exacerbation of her vertiginous symptoms that lasted ~12 seconds. She noted mild imbalance following treatment, and she was advised to avoid forward bending to maintain her current canalith positioning. Alma Delia exhibited good technique with therapeutic exercises and would benefit from continued PT to reduce her likelihood of canalith malpositioning and maximize her tolerance for occupational activities.       Plan: Continue per plan of care.      Precautions: Hx of migraines, Meniere's Disease, mal de debarquement, anxiety, and depression; LATEX ALLERGY  HEP Code: N/A  POC: 2025-2025    Manuals 04/07 04/10 04/15      Sammi-Hallpike Test  (+) L (+) L      Roll Test (+) L        Epley Maneuver         BBQ Maneuver Performed 2x Performed 3x Performed 3x      SOR/MFR of Cervical Paraspinals/Cervical Distratcion                  Neuro Re-Ed         Cervical AROM (FLX/EXT/SB/ROT) 10x each  10x each      VOR1  (Horizontal/Vertical)  3x30\" each 3x30\" each      Visual Diagonals w/ Cards  15x each 2x15 each      Two-Point Saccades w/ Pens (Horizontal/Vertical)  2x20\" each 2x20\" each      Ball Toss at Wall w/ Various LEONEL         Reach Out of LEONEL (FWD/LAT)         NBOS w/ Head Turns and Reach       "   Wobble Board Standing w/ Perturbations (AP/LAT)         Wobble Board Standing w/ Ball Throw (AP/LAT)         Ambulation w/ Head Turns         Ambulation w/ Head Nods         Caraokes         Cone Taps         CTSIB                  Ther Ex         Treadmill         Marches         Side Steps                  Ther Activity          FWD Bending w/ Rings         LAT Ring Toss Between Tables         Ambulation w/ Ball Toss         Card Activity

## 2025-04-17 ENCOUNTER — APPOINTMENT (OUTPATIENT)
Dept: PHYSICAL THERAPY | Facility: CLINIC | Age: 50
End: 2025-04-17
Payer: COMMERCIAL

## 2025-04-18 ENCOUNTER — OFFICE VISIT (OUTPATIENT)
Dept: PHYSICAL THERAPY | Facility: CLINIC | Age: 50
End: 2025-04-18
Attending: FAMILY MEDICINE
Payer: COMMERCIAL

## 2025-04-18 DIAGNOSIS — R42 VERTIGO: Primary | ICD-10-CM

## 2025-04-18 PROCEDURE — 97140 MANUAL THERAPY 1/> REGIONS: CPT

## 2025-04-18 PROCEDURE — 97112 NEUROMUSCULAR REEDUCATION: CPT

## 2025-04-18 NOTE — PROGRESS NOTES
"Daily Note     Today's date: 2025  Patient name: Di IJ   : 1975  MRN: [id number]  Referring provider: Cesar Bateman MD  Dx:   Encounter Diagnosis     ICD-10-CM    1. Vertigo  R42           Start Time: 0700  Stop Time: 0758  Total time in clinic (min): 58 minutes    Subjective: Alma Delia reported that she was very nauseous and clammy following her prior PT visit, and she stated that her spinning had worsened significantly. Today, she has a little bit of movement without nausea. She performed the Epley on herself twice yesterday without symptoms, and she felt that it didn't make a difference.       Objective: See treatment diary below      Assessment: Alma Delia tolerated treatment well. She demonstrated (-) Orlinda-Hallpike and Roll Tests, and these were tested at the beginning and end of treatment. Additional dynamic gait exercises were incorporated, and she noted minimal dizziness as opposed to general lightheadedness. Alma Delia would benefit from continued PT to improve her VOR during everyday movements and reduce her vertiginous symptom severity and frequency.       Plan: Continue per plan of care.      Precautions: Hx of migraines, Meniere's Disease, mal de debarquement, anxiety, and depression; LATEX ALLERGY  HEP Code: N/A  POC: 2025-2025    Manuals 04/07 04/10 04/15 04/18     Sammi-Hallpike Test  (+) L (+) L (-) B/L     Roll Test (+) L   (-) B/L     Epley Maneuver         BBQ Maneuver Performed 2x Performed 3x Performed 3x      SOR/MFR of Cervical Paraspinals/Cervical Distratcion                  Neuro Re-Ed         Cervical AROM (FLX/EXT/SB/ROT) 10x each  10x each      VOR1  (Horizontal/Vertical)  3x30\" each 3x30\" each 3x30\" each     Visual Diagonals w/ Cards  15x each 2x15 each 2x15 each     Two-Point Saccades w/ Pens (Horizontal/Vertical)  2x20\" each 2x20\" each 3x30\" each     Ball Toss at Wall w/ Various LEONEL    3x15  S Romb     Reach Out of LEONEL (FWD/LAT)         NBOS w/ Head Turns and " Reach         Wobble Board Standing w/ Perturbations (AP/LAT)         Wobble Board Standing w/ Ball Throw (AP/LAT)         Ambulation w/ Head Turns         Ambulation w/ Head Nods         Side Steps w/ Ball Toss    6x20ft     Ambulation w/ OH Ball Toss    [id number]     Ambulation w/ LAT Ball Toss    [id number]     Caraokes         Cone Taps         CTSIB                  Ther Ex         Treadmill         Marches         Side Steps                  Ther Activity          FWD Bending w/ Rings         LAT Ring Toss Between Tables         Ambulation w/ Ball Toss         Card Activity

## 2025-04-21 ENCOUNTER — OFFICE VISIT (OUTPATIENT)
Dept: PHYSICAL THERAPY | Facility: CLINIC | Age: 50
End: 2025-04-21
Payer: COMMERCIAL

## 2025-04-21 DIAGNOSIS — R42 VERTIGO: Primary | ICD-10-CM

## 2025-04-21 PROCEDURE — 97112 NEUROMUSCULAR REEDUCATION: CPT

## 2025-04-21 PROCEDURE — 97140 MANUAL THERAPY 1/> REGIONS: CPT

## 2025-04-21 NOTE — PROGRESS NOTES
"Daily Note     Today's date: 2025  Patient name: Di JI   : 1975  MRN: [id number]  Referring provider: Cesar Bateman MD  Dx:   Encounter Diagnosis     ICD-10-CM    1. Vertigo  R42           Start Time: 1601  Stop Time: 1655  Total time in clinic (min): 54 minutes    Subjective: Alma Delia reported that her \"barometer\" is going off right now. She is disgusted because of her symptoms, and her back is hurting just below her left shoulder blade.       Objective: See treatment diary below      Assessment: Alma Delia tolerated treatment well. Increased tightness was present in the cervicothoracic region as Alma Delia avoided head movement due to her vertigo, making it difficult to perform positioning testing, and this improved with STM of the cervicothoracic paraspinals. She exhibited a (+) Sammi-Hallpike Test to the R, and it was treated with two Epley Maneuvers subsequently. Exercises were performed with minimal symptom exacerbation, and she was encouraged to continue performing her exercises as advised to maximize the benefit of habituation for chronic vertigo. Alma Delia would benefit from continued PT to reduce her vertiginous symptoms and maximize her tolerance for activity.       Plan: Continue per plan of care.      Precautions: Hx of migraines, Meniere's Disease, mal de debarquement, anxiety, and depression; LATEX ALLERGY  HEP Code: N/A  POC: 2025-2025    Manuals 04/07 04/10 04/15 04/18 04/21    Sammi-Hallpike Test  (+) L (+) L (-) B/L (+) R    Roll Test (+) L   (-) B/L     Epley Maneuver     Performed  2x    BBQ Maneuver Performed 2x Performed 3x Performed 3x      SOR/MFR of Cervical Paraspinals/Cervical Distraction     6 min  Cervico-  Thoracic             Neuro Re-Ed         Cervical AROM (FLX/EXT/SB/ROT) 10x each  10x each      VOR1  (Horizontal/Vertical)  3x30\" each 3x30\" each 3x30\" each 3x30\" each    Visual Diagonals w/ Cards  15x each 2x15 each 2x15 each 3x30\" each    Two-Point Saccades w/ " "Pens (Horizontal/Vertical)  2x20\" each 2x20\" each 3x30\" each 3x30\" each    Ball Toss at Wall w/ Various LEONEL    3x15  S Romb     Reach Out of LEONEL (FWD/LAT)         NBOS w/ Head Turns and Reach         Wobble Board Standing w/ Perturbations (AP/LAT)         Wobble Board Standing w/ Ball Throw (AP/LAT)         Ambulation w/ Head Turns         Ambulation w/ Head Nods         Side Steps w/ Ball Toss    6x20ft 6x20ft    Ambulation w/ OH Ball Toss    [id number] 6x20ft    Ambulation w/ LAT Ball Toss    [id number]     Caraokes         Cone Taps         CTSIB                  Ther Ex         Treadmill         Marches         Side Steps                  Ther Activity          FWD Bending w/ Rings         LAT Ring Toss Between Tables         Ambulation w/ Ball Toss         Card Activity                               "

## 2025-04-23 ENCOUNTER — OFFICE VISIT (OUTPATIENT)
Dept: PHYSICAL THERAPY | Facility: CLINIC | Age: 50
End: 2025-04-23
Payer: COMMERCIAL

## 2025-04-23 DIAGNOSIS — R42 VERTIGO: Primary | ICD-10-CM

## 2025-04-23 PROCEDURE — 97530 THERAPEUTIC ACTIVITIES: CPT

## 2025-04-23 PROCEDURE — 97112 NEUROMUSCULAR REEDUCATION: CPT

## 2025-04-23 NOTE — PROGRESS NOTES
"Daily Note     Today's date: 2025  Patient name: Di JI   : 1975  MRN: [id number]  Referring provider: Cesar Bateman MD  Dx:   Encounter Diagnosis     ICD-10-CM    1. Vertigo  R42           Start Time: 1559  Stop Time: 1648  Total time in clinic (min): 49 minutes    Subjective: Alma Delia reported that she is feeling much better today, and she is having less movement. Her  has been treating her cervicothoracic pain with a TheraGun, and this has helped her significantly.       Objective: See treatment diary below      Assessment: Alma Delia tolerated treatment well. Positional testing was held secondary to minimal symptoms and prevention of symptom exacerbation. Habituation and dual-tasking activities were incorporated into her program, and she noted that her head felt \"heavy\" during certain movement. She felt like she was \"moving\" following perturbations on the Wobble Board. Alma Delia demonstrated fatigue post treatment and would benefit from continued PT to reduce her vertiginous symptoms and maximize her tolerance for habitual activities.       Plan: Continue per plan of care.      Precautions: Hx of migraines, Meniere's Disease, mal de debarquement, anxiety, and depression; LATEX ALLERGY  HEP Code: N/A  POC: 2025-2025    Manuals 04/07 04/10 04/15 04/18 04/21 04/23   Sammi-Hallpike Test  (+) L (+) L (-) B/L (+) R    Roll Test (+) L   (-) B/L     Epley Maneuver     Performed  2x    BBQ Maneuver Performed 2x Performed 3x Performed 3x      SOR/MFR of Cervical Paraspinals/Cervical Distraction     6 min  Cervico-  Thoracic             Neuro Re-Ed         Cervical AROM (FLX/EXT/SB/ROT) 10x each  10x each      VOR1  (Horizontal/Vertical)  3x30\" each 3x30\" each 3x30\" each 3x30\" each 3x30\" each   Visual Diagonals w/ Cards  15x each 2x15 each 2x15 each 3x30\" each 3x30\" each   Two-Point Saccades w/ Pens (Horizontal/Vertical)  2x20\" each 2x20\" each 3x30\" each 3x30\" each 3x30\" each   Ball Toss at " Wall on Foam    3x15  S Romb  4x20  S Romb    Reach Out of LEONEL (FWD/LAT)         NBOS w/ Head Turns and Reach         Wobble Board Standing w/ Perturbations (AP/LAT)      2x20   Wobble Board Standing w/ Ball Throw (AP/LAT)         Ambulation w/ Head Turns         Ambulation w/ Head Nods         Side Steps w/ Ball Toss    6x20ft 6x20ft 6x20ft   Ambulation w/ OH Ball Toss    [id number] 6x20ft 6x20ft   Ambulation w/ LAT Ball Toss    [id number]  6x20ft   Caraokes         Cone Taps         CTSIB                  Ther Ex         Treadmill         Marches         Side Steps                  Ther Activity          FWD/LAT Ring Pass Between Tables      4x   FWD/BWD Ring Pass Between Tables      2x   Card Activity

## 2025-04-28 ENCOUNTER — OFFICE VISIT (OUTPATIENT)
Dept: PHYSICAL THERAPY | Facility: CLINIC | Age: 50
End: 2025-04-28
Payer: COMMERCIAL

## 2025-04-28 DIAGNOSIS — R42 VERTIGO: Primary | ICD-10-CM

## 2025-04-28 PROCEDURE — 97112 NEUROMUSCULAR REEDUCATION: CPT

## 2025-04-28 PROCEDURE — 97140 MANUAL THERAPY 1/> REGIONS: CPT

## 2025-04-28 NOTE — PROGRESS NOTES
"Daily Note     Today's date: 2025  Patient name: Di JI   : 1975  MRN: [id number]  Referring provider: Cesar Bateman MD  Dx:   Encounter Diagnosis     ICD-10-CM    1. Vertigo  R42           Start Time: 0801  Stop Time: 0850  Total time in clinic (min): 49 minutes    Subjective: Alma Delia reported that she felt horrible after her prior physical therapy treatment, and she attributed it to standing on the Wobble Board. She believes that she may be having a vestibular nerve issue again, and she noted that turning her head left/right causes occasional lightheadedness with \"light spinning.\"      Objective: See treatment diary below      Assessment: Alma Delia tolerated treatment fairly. She was symptomatic with a brief torsional nystagmus with a right Sammi-Hallpike Test, and an Epley was performed subsequently. STM and MFR was performed on the cervical paraspinals secondary to difficulty turning her head, and she exhibited improved soft tissue mobility. She was encouraged to continue performing her HEP as advised, and she should track her symptoms each day. Alma Delia would benefit from continued PT to reduce her vertiginous symptom frequency and severity.       Plan: Continue per plan of care.      Precautions: Hx of migraines, Meniere's Disease, mal de debarquement, anxiety, and depression; LATEX ALLERGY  HEP Code: N/A  POC: 2025-2025    Manuals 04/28  04/15 04/18 04/21 04/23   Arrey-Hallpike Test (+) R  (+) L (-) B/L (+) R    Roll Test    (-) B/L     Epley Maneuver Performed 3x    Performed  2x    BBQ Maneuver   Performed 3x      SOR/MFR of Cervical Paraspinals/Cervical Distraction 15 min    6 min  Cervico-  Thoracic             Neuro Re-Ed         Cervical AROM (FLX/EXT/SB/ROT)   10x each      Review of HEP/PT Education 6 min        VOR1  (Horizontal/Vertical) 3x30\" each  3x30\" each 3x30\" each 3x30\" each 3x30\" each   Visual Diagonals w/ Cards 3x30\" each  2x15 each 2x15 each 3x30\" each 3x30\" each " "  Two-Point Saccades w/ Pens (Horizontal/Vertical)   2x20\" each 3x30\" each 3x30\" each 3x30\" each   Ball Toss at Wall on Foam    3x15  S Romb  4x20  S Romb    Reach Out of LEONEL (FWD/LAT)         NBOS w/ Head Turns and Reach         Wobble Board Standing w/ Perturbations (AP/LAT)      2x20   Wobble Board Standing w/ Ball Throw (AP/LAT)         Ambulation w/ Head Turns         Ambulation w/ Head Nods         Side Steps w/ Ball Toss    6x20ft 6x20ft 6x20ft   Ambulation w/ OH Ball Toss    [id number] 6x20ft 6x20ft   Ambulation w/ LAT Ball Toss    [id number]  6x20ft   Caraokes         Cone Taps         CTSIB                  Ther Ex         Treadmill         Marches         Side Steps                  Ther Activity          FWD/LAT Ring Pass Between Tables      4x   FWD/BWD Ring Pass Between Tables      2x   Card Activity                                   "

## 2025-05-02 ENCOUNTER — HOSPITAL ENCOUNTER (OUTPATIENT)
Dept: CT IMAGING | Facility: HOSPITAL | Age: 50
Discharge: HOME | End: 2025-05-02
Payer: COMMERCIAL

## 2025-05-02 ENCOUNTER — HOSPITAL ENCOUNTER (OUTPATIENT)
Dept: ULTRASOUND IMAGING | Facility: HOSPITAL | Age: 50
Discharge: HOME | End: 2025-05-02
Payer: COMMERCIAL

## 2025-05-02 DIAGNOSIS — D45 POLYCYTHEMIA VERA (HCC): ICD-10-CM

## 2025-05-02 PROCEDURE — 76700 US EXAM ABDOM COMPLETE: CPT

## 2025-05-02 PROCEDURE — 71250 CT THORAX DX C-: CPT

## 2025-05-10 DIAGNOSIS — Z30.41 ENCOUNTER FOR SURVEILLANCE OF CONTRACEPTIVE PILLS: ICD-10-CM

## 2025-05-11 RX ORDER — NORETHINDRONE ACETATE AND ETHINYL ESTRADIOL 20; 1 UG/1; MG/1
TABLET ORAL
Qty: 84 TABLET | Refills: 1 | Status: SHIPPED | OUTPATIENT
Start: 2025-05-11

## 2025-06-19 DIAGNOSIS — N93.9 ABNORMAL UTERINE BLEEDING (AUB): Primary | ICD-10-CM

## (undated) DEVICE — DRAPE EQUIPMENT RF WAND

## (undated) DEVICE — SCD SEQUENTIAL COMPRESSION COMFORT SLEEVE MEDIUM KNEE LENGTH: Brand: KENDALL SCD

## (undated) DEVICE — GLOVE PI ULTRA TOUCH SZ.7.0

## (undated) DEVICE — STRL ALLENTOWN HYSTEROSCOPY PK: Brand: CARDINAL HEALTH

## (undated) DEVICE — TISSUE REMOVAL SYSTEM FLUID MANAGEMENT ACCESSORIES: Brand: SYMPHION

## (undated) DEVICE — CYSTO TUBING SINGLE IRRIGATION

## (undated) DEVICE — TISSUE REMOVAL SYSTEM RESECTING DEVICE: Brand: SYMPHION

## (undated) DEVICE — PREMIUM DRY TRAY LF: Brand: MEDLINE INDUSTRIES, INC.

## (undated) DEVICE — UNDER BUTTOCKS DRAPE W/FLUID CONTROL POUCH: Brand: CONVERTORS

## (undated) DEVICE — 2000CC GUARDIAN II: Brand: GUARDIAN

## (undated) DEVICE — IV EXTENSION TUBING 33 IN

## (undated) DEVICE — PVC URETHRAL CATHETER: Brand: DOVER